# Patient Record
Sex: FEMALE | Race: WHITE | Employment: OTHER | URBAN - METROPOLITAN AREA
[De-identification: names, ages, dates, MRNs, and addresses within clinical notes are randomized per-mention and may not be internally consistent; named-entity substitution may affect disease eponyms.]

---

## 2017-08-29 PROBLEM — M54.12 C6 RADICULOPATHY: Status: ACTIVE | Noted: 2017-08-29

## 2017-09-15 PROBLEM — M45.9 RHEUMATOID ARTHRITIS INVOLVING VERTEBRA WITH POSITIVE RHEUMATOID FACTOR (HCC): Status: ACTIVE | Noted: 2017-09-15

## 2018-04-17 ENCOUNTER — OFFICE VISIT (OUTPATIENT)
Dept: FAMILY MEDICINE CLINIC | Age: 63
End: 2018-04-17
Payer: COMMERCIAL

## 2018-04-17 VITALS
WEIGHT: 150 LBS | TEMPERATURE: 97.9 F | DIASTOLIC BLOOD PRESSURE: 74 MMHG | SYSTOLIC BLOOD PRESSURE: 120 MMHG | HEIGHT: 62 IN | BODY MASS INDEX: 27.6 KG/M2 | OXYGEN SATURATION: 98 % | RESPIRATION RATE: 16 BRPM | HEART RATE: 71 BPM

## 2018-04-17 DIAGNOSIS — R53.83 FATIGUE, UNSPECIFIED TYPE: ICD-10-CM

## 2018-04-17 DIAGNOSIS — S09.90XA INJURY OF HEAD, INITIAL ENCOUNTER: ICD-10-CM

## 2018-04-17 DIAGNOSIS — D64.9 ANEMIA, UNSPECIFIED TYPE: ICD-10-CM

## 2018-04-17 DIAGNOSIS — M05.79 RHEUMATOID ARTHRITIS INVOLVING MULTIPLE SITES WITH POSITIVE RHEUMATOID FACTOR (HCC): ICD-10-CM

## 2018-04-17 DIAGNOSIS — E11.9 TYPE 2 DIABETES MELLITUS WITHOUT COMPLICATION, WITHOUT LONG-TERM CURRENT USE OF INSULIN (HCC): Primary | ICD-10-CM

## 2018-04-17 DIAGNOSIS — E55.9 VITAMIN D DEFICIENCY: ICD-10-CM

## 2018-04-17 LAB — HBA1C MFR BLD: 6.3 %

## 2018-04-17 PROCEDURE — G8427 DOCREV CUR MEDS BY ELIG CLIN: HCPCS | Performed by: NURSE PRACTITIONER

## 2018-04-17 PROCEDURE — 3014F SCREEN MAMMO DOC REV: CPT | Performed by: NURSE PRACTITIONER

## 2018-04-17 PROCEDURE — 83036 HEMOGLOBIN GLYCOSYLATED A1C: CPT | Performed by: NURSE PRACTITIONER

## 2018-04-17 PROCEDURE — 2022F DILAT RTA XM EVC RTNOPTHY: CPT | Performed by: NURSE PRACTITIONER

## 2018-04-17 PROCEDURE — 3017F COLORECTAL CA SCREEN DOC REV: CPT | Performed by: NURSE PRACTITIONER

## 2018-04-17 PROCEDURE — 1036F TOBACCO NON-USER: CPT | Performed by: NURSE PRACTITIONER

## 2018-04-17 PROCEDURE — 99214 OFFICE O/P EST MOD 30 MIN: CPT | Performed by: NURSE PRACTITIONER

## 2018-04-17 PROCEDURE — G8417 CALC BMI ABV UP PARAM F/U: HCPCS | Performed by: NURSE PRACTITIONER

## 2018-04-17 PROCEDURE — 3044F HG A1C LEVEL LT 7.0%: CPT | Performed by: NURSE PRACTITIONER

## 2018-04-17 RX ORDER — TRAMADOL HYDROCHLORIDE 50 MG/1
50 TABLET ORAL EVERY 8 HOURS PRN
Qty: 90 TABLET | Refills: 0 | Status: SHIPPED | OUTPATIENT
Start: 2018-04-17 | End: 2018-05-15 | Stop reason: SDUPTHER

## 2018-04-17 ASSESSMENT — ENCOUNTER SYMPTOMS
VOMITING: 0
COUGH: 0
NAUSEA: 0
CONSTIPATION: 1
BACK PAIN: 1
WHEEZING: 0
SHORTNESS OF BREATH: 0
DIARRHEA: 0

## 2018-04-17 ASSESSMENT — PATIENT HEALTH QUESTIONNAIRE - PHQ9
SUM OF ALL RESPONSES TO PHQ QUESTIONS 1-9: 0
1. LITTLE INTEREST OR PLEASURE IN DOING THINGS: 0
SUM OF ALL RESPONSES TO PHQ9 QUESTIONS 1 & 2: 0
2. FEELING DOWN, DEPRESSED OR HOPELESS: 0

## 2018-05-04 ENCOUNTER — EVALUATION (OUTPATIENT)
Dept: PHYSICAL THERAPY | Age: 63
End: 2018-05-04
Payer: COMMERCIAL

## 2018-05-04 DIAGNOSIS — M45.9 RHEUMATOID ARTHRITIS INVOLVING VERTEBRA WITH POSITIVE RHEUMATOID FACTOR (HCC): Primary | ICD-10-CM

## 2018-05-04 PROCEDURE — 97162 PT EVAL MOD COMPLEX 30 MIN: CPT | Performed by: PHYSICAL THERAPIST

## 2018-05-09 ENCOUNTER — TREATMENT (OUTPATIENT)
Dept: PHYSICAL THERAPY | Age: 63
End: 2018-05-09
Payer: COMMERCIAL

## 2018-05-09 DIAGNOSIS — M45.9 RHEUMATOID ARTHRITIS INVOLVING VERTEBRA WITH POSITIVE RHEUMATOID FACTOR (HCC): Primary | ICD-10-CM

## 2018-05-09 PROCEDURE — G0283 ELEC STIM OTHER THAN WOUND: HCPCS

## 2018-05-09 PROCEDURE — 97110 THERAPEUTIC EXERCISES: CPT

## 2018-05-12 ENCOUNTER — HOSPITAL ENCOUNTER (OUTPATIENT)
Age: 63
Discharge: HOME | End: 2018-05-12
Payer: COMMERCIAL

## 2018-05-12 DIAGNOSIS — D64.9 ANEMIA, UNSPECIFIED TYPE: ICD-10-CM

## 2018-05-12 DIAGNOSIS — R53.83 FATIGUE, UNSPECIFIED TYPE: ICD-10-CM

## 2018-05-12 DIAGNOSIS — E11.9 TYPE 2 DIABETES MELLITUS WITHOUT COMPLICATION, WITHOUT LONG-TERM CURRENT USE OF INSULIN (HCC): ICD-10-CM

## 2018-05-12 DIAGNOSIS — E55.9 VITAMIN D DEFICIENCY: ICD-10-CM

## 2018-05-12 LAB
ALBUMIN SERPL-MCNC: 3.5 G/DL (ref 3.5–5.2)
ALP BLD-CCNC: 95 U/L (ref 35–104)
ALT SERPL-CCNC: 9 U/L (ref 0–32)
ANION GAP SERPL CALCULATED.3IONS-SCNC: 10 MMOL/L (ref 7–16)
AST SERPL-CCNC: 12 U/L (ref 0–31)
BASOPHILS ABSOLUTE: 0.03 E9/L (ref 0–0.2)
BASOPHILS RELATIVE PERCENT: 0.4 % (ref 0–2)
BILIRUB SERPL-MCNC: 0.2 MG/DL (ref 0–1.2)
BUN BLDV-MCNC: 21 MG/DL (ref 8–23)
CALCIUM SERPL-MCNC: 9.2 MG/DL (ref 8.6–10.2)
CHLORIDE BLD-SCNC: 98 MMOL/L (ref 98–107)
CHOLESTEROL, TOTAL: 169 MG/DL (ref 0–199)
CO2: 31 MMOL/L (ref 22–29)
CREAT SERPL-MCNC: 0.8 MG/DL (ref 0.5–1)
EOSINOPHILS ABSOLUTE: 0.14 E9/L (ref 0.05–0.5)
EOSINOPHILS RELATIVE PERCENT: 2.1 % (ref 0–6)
GFR AFRICAN AMERICAN: >60
GFR NON-AFRICAN AMERICAN: >60 ML/MIN/1.73
GLUCOSE BLD-MCNC: 127 MG/DL (ref 74–109)
HCT VFR BLD CALC: 28.5 % (ref 34–48)
HDLC SERPL-MCNC: 67 MG/DL
HEMOGLOBIN: 8.9 G/DL (ref 11.5–15.5)
IMMATURE GRANULOCYTES #: 0.03 E9/L
IMMATURE GRANULOCYTES %: 0.4 % (ref 0–5)
LDL CHOLESTEROL CALCULATED: 89 MG/DL (ref 0–99)
LYMPHOCYTES ABSOLUTE: 1.02 E9/L (ref 1.5–4)
LYMPHOCYTES RELATIVE PERCENT: 15.1 % (ref 20–42)
MCH RBC QN AUTO: 27.5 PG (ref 26–35)
MCHC RBC AUTO-ENTMCNC: 31.2 % (ref 32–34.5)
MCV RBC AUTO: 88 FL (ref 80–99.9)
MONOCYTES ABSOLUTE: 0.63 E9/L (ref 0.1–0.95)
MONOCYTES RELATIVE PERCENT: 9.3 % (ref 2–12)
NEUTROPHILS ABSOLUTE: 4.91 E9/L (ref 1.8–7.3)
NEUTROPHILS RELATIVE PERCENT: 72.7 % (ref 43–80)
PDW BLD-RTO: 14.8 FL (ref 11.5–15)
PLATELET # BLD: 247 E9/L (ref 130–450)
PMV BLD AUTO: 10.1 FL (ref 7–12)
POTASSIUM SERPL-SCNC: 4.1 MMOL/L (ref 3.5–5)
RBC # BLD: 3.24 E12/L (ref 3.5–5.5)
SODIUM BLD-SCNC: 139 MMOL/L (ref 132–146)
TOTAL PROTEIN: 8.3 G/DL (ref 6.4–8.3)
TRIGL SERPL-MCNC: 65 MG/DL (ref 0–149)
TSH SERPL DL<=0.05 MIU/L-ACNC: 1.82 UIU/ML (ref 0.27–4.2)
VITAMIN D 25-HYDROXY: 18 NG/ML (ref 30–100)
VLDLC SERPL CALC-MCNC: 13 MG/DL
WBC # BLD: 6.8 E9/L (ref 4.5–11.5)

## 2018-05-12 PROCEDURE — 85025 COMPLETE CBC W/AUTO DIFF WBC: CPT

## 2018-05-12 PROCEDURE — 36415 COLL VENOUS BLD VENIPUNCTURE: CPT

## 2018-05-12 PROCEDURE — 80061 LIPID PANEL: CPT

## 2018-05-12 PROCEDURE — 84443 ASSAY THYROID STIM HORMONE: CPT

## 2018-05-12 PROCEDURE — 82306 VITAMIN D 25 HYDROXY: CPT

## 2018-05-12 PROCEDURE — 80053 COMPREHEN METABOLIC PANEL: CPT

## 2018-05-14 ENCOUNTER — TELEPHONE (OUTPATIENT)
Dept: PHYSICAL THERAPY | Age: 63
End: 2018-05-14

## 2018-05-15 ENCOUNTER — OFFICE VISIT (OUTPATIENT)
Dept: FAMILY MEDICINE CLINIC | Age: 63
End: 2018-05-15
Payer: COMMERCIAL

## 2018-05-15 VITALS
TEMPERATURE: 98.1 F | WEIGHT: 166 LBS | HEART RATE: 77 BPM | SYSTOLIC BLOOD PRESSURE: 118 MMHG | OXYGEN SATURATION: 97 % | BODY MASS INDEX: 30.55 KG/M2 | DIASTOLIC BLOOD PRESSURE: 70 MMHG | HEIGHT: 62 IN

## 2018-05-15 DIAGNOSIS — E55.9 VITAMIN D DEFICIENCY: Primary | ICD-10-CM

## 2018-05-15 DIAGNOSIS — M05.79 RHEUMATOID ARTHRITIS INVOLVING MULTIPLE SITES WITH POSITIVE RHEUMATOID FACTOR (HCC): ICD-10-CM

## 2018-05-15 DIAGNOSIS — Z71.2 ENCOUNTER TO DISCUSS TEST RESULTS: ICD-10-CM

## 2018-05-15 PROCEDURE — 3017F COLORECTAL CA SCREEN DOC REV: CPT | Performed by: NURSE PRACTITIONER

## 2018-05-15 PROCEDURE — 1036F TOBACCO NON-USER: CPT | Performed by: NURSE PRACTITIONER

## 2018-05-15 PROCEDURE — 99213 OFFICE O/P EST LOW 20 MIN: CPT | Performed by: NURSE PRACTITIONER

## 2018-05-15 PROCEDURE — G8427 DOCREV CUR MEDS BY ELIG CLIN: HCPCS | Performed by: NURSE PRACTITIONER

## 2018-05-15 PROCEDURE — G8417 CALC BMI ABV UP PARAM F/U: HCPCS | Performed by: NURSE PRACTITIONER

## 2018-05-15 RX ORDER — ERGOCALCIFEROL 1.25 MG/1
50000 CAPSULE ORAL WEEKLY
Qty: 12 CAPSULE | Refills: 0 | Status: SHIPPED | OUTPATIENT
Start: 2018-05-15 | End: 2019-01-02 | Stop reason: ALTCHOICE

## 2018-05-15 RX ORDER — TRAMADOL HYDROCHLORIDE 50 MG/1
50 TABLET ORAL EVERY 8 HOURS PRN
Qty: 90 TABLET | Refills: 0 | Status: SHIPPED | OUTPATIENT
Start: 2018-05-15 | End: 2018-08-07 | Stop reason: SDUPTHER

## 2018-05-15 RX ORDER — ERGOCALCIFEROL 1.25 MG/1
CAPSULE ORAL
Refills: 0 | COMMUNITY
Start: 2018-04-23 | End: 2018-06-12

## 2018-05-15 ASSESSMENT — ENCOUNTER SYMPTOMS
VOMITING: 0
BLURRED VISION: 0
SHORTNESS OF BREATH: 0
WHEEZING: 0
BACK PAIN: 1
DIARRHEA: 0
CONSTIPATION: 0
COUGH: 0
NAUSEA: 0

## 2018-05-17 ENCOUNTER — TREATMENT (OUTPATIENT)
Dept: PHYSICAL THERAPY | Age: 63
End: 2018-05-17
Payer: COMMERCIAL

## 2018-05-17 DIAGNOSIS — M45.9 RHEUMATOID ARTHRITIS INVOLVING VERTEBRA WITH POSITIVE RHEUMATOID FACTOR (HCC): Primary | ICD-10-CM

## 2018-05-17 DIAGNOSIS — D64.9 ANEMIA, UNSPECIFIED TYPE: Primary | ICD-10-CM

## 2018-05-17 PROCEDURE — G0283 ELEC STIM OTHER THAN WOUND: HCPCS

## 2018-05-17 PROCEDURE — 97110 THERAPEUTIC EXERCISES: CPT

## 2018-05-24 ENCOUNTER — TREATMENT (OUTPATIENT)
Dept: PHYSICAL THERAPY | Age: 63
End: 2018-05-24
Payer: COMMERCIAL

## 2018-05-24 DIAGNOSIS — M45.9 RHEUMATOID ARTHRITIS INVOLVING VERTEBRA WITH POSITIVE RHEUMATOID FACTOR (HCC): Primary | ICD-10-CM

## 2018-05-24 PROCEDURE — G0283 ELEC STIM OTHER THAN WOUND: HCPCS

## 2018-05-24 PROCEDURE — 97110 THERAPEUTIC EXERCISES: CPT

## 2018-05-31 ENCOUNTER — TREATMENT (OUTPATIENT)
Dept: PHYSICAL THERAPY | Age: 63
End: 2018-05-31
Payer: COMMERCIAL

## 2018-05-31 DIAGNOSIS — M45.9 RHEUMATOID ARTHRITIS INVOLVING VERTEBRA WITH POSITIVE RHEUMATOID FACTOR (HCC): Primary | ICD-10-CM

## 2018-05-31 PROCEDURE — G0283 ELEC STIM OTHER THAN WOUND: HCPCS

## 2018-05-31 PROCEDURE — 97110 THERAPEUTIC EXERCISES: CPT

## 2018-06-06 ENCOUNTER — TREATMENT (OUTPATIENT)
Dept: PHYSICAL THERAPY | Age: 63
End: 2018-06-06
Payer: COMMERCIAL

## 2018-06-06 DIAGNOSIS — M45.9 RHEUMATOID ARTHRITIS INVOLVING VERTEBRA WITH POSITIVE RHEUMATOID FACTOR (HCC): Primary | ICD-10-CM

## 2018-06-06 PROCEDURE — 97110 THERAPEUTIC EXERCISES: CPT

## 2018-06-06 PROCEDURE — G0283 ELEC STIM OTHER THAN WOUND: HCPCS

## 2018-06-08 ENCOUNTER — TREATMENT (OUTPATIENT)
Dept: PHYSICAL THERAPY | Age: 63
End: 2018-06-08
Payer: COMMERCIAL

## 2018-06-08 DIAGNOSIS — M45.9 RHEUMATOID ARTHRITIS INVOLVING VERTEBRA WITH POSITIVE RHEUMATOID FACTOR (HCC): Primary | ICD-10-CM

## 2018-06-08 PROCEDURE — 97110 THERAPEUTIC EXERCISES: CPT

## 2018-06-08 PROCEDURE — G0283 ELEC STIM OTHER THAN WOUND: HCPCS

## 2018-06-12 ENCOUNTER — OFFICE VISIT (OUTPATIENT)
Dept: FAMILY MEDICINE CLINIC | Age: 63
End: 2018-06-12
Payer: COMMERCIAL

## 2018-06-12 VITALS
OXYGEN SATURATION: 93 % | BODY MASS INDEX: 30.36 KG/M2 | RESPIRATION RATE: 16 BRPM | TEMPERATURE: 98.2 F | SYSTOLIC BLOOD PRESSURE: 130 MMHG | DIASTOLIC BLOOD PRESSURE: 78 MMHG | HEIGHT: 62 IN | HEART RATE: 91 BPM | WEIGHT: 165 LBS

## 2018-06-12 DIAGNOSIS — R29.898 DECREASED GRIP STRENGTH: ICD-10-CM

## 2018-06-12 DIAGNOSIS — J30.2 CHRONIC SEASONAL ALLERGIC RHINITIS, UNSPECIFIED TRIGGER: ICD-10-CM

## 2018-06-12 DIAGNOSIS — H61.23 BILATERAL IMPACTED CERUMEN: Primary | ICD-10-CM

## 2018-06-12 PROCEDURE — G8427 DOCREV CUR MEDS BY ELIG CLIN: HCPCS | Performed by: NURSE PRACTITIONER

## 2018-06-12 PROCEDURE — 99213 OFFICE O/P EST LOW 20 MIN: CPT | Performed by: NURSE PRACTITIONER

## 2018-06-12 PROCEDURE — 1036F TOBACCO NON-USER: CPT | Performed by: NURSE PRACTITIONER

## 2018-06-12 PROCEDURE — 3017F COLORECTAL CA SCREEN DOC REV: CPT | Performed by: NURSE PRACTITIONER

## 2018-06-12 PROCEDURE — G8417 CALC BMI ABV UP PARAM F/U: HCPCS | Performed by: NURSE PRACTITIONER

## 2018-06-12 ASSESSMENT — ENCOUNTER SYMPTOMS
SORE THROAT: 1
DIARRHEA: 0
NAUSEA: 0
BACK PAIN: 1
COUGH: 1
VOMITING: 0
CONSTIPATION: 0
SHORTNESS OF BREATH: 0
WHEEZING: 0

## 2018-06-13 ENCOUNTER — TREATMENT (OUTPATIENT)
Dept: PHYSICAL THERAPY | Age: 63
End: 2018-06-13
Payer: COMMERCIAL

## 2018-06-13 DIAGNOSIS — M45.9 RHEUMATOID ARTHRITIS INVOLVING VERTEBRA WITH POSITIVE RHEUMATOID FACTOR (HCC): Primary | ICD-10-CM

## 2018-06-13 PROCEDURE — 97110 THERAPEUTIC EXERCISES: CPT

## 2018-06-15 ENCOUNTER — TREATMENT (OUTPATIENT)
Dept: PHYSICAL THERAPY | Age: 63
End: 2018-06-15
Payer: COMMERCIAL

## 2018-06-15 DIAGNOSIS — M45.9 RHEUMATOID ARTHRITIS INVOLVING VERTEBRA WITH POSITIVE RHEUMATOID FACTOR (HCC): Primary | ICD-10-CM

## 2018-06-15 PROCEDURE — 97110 THERAPEUTIC EXERCISES: CPT

## 2018-06-20 ENCOUNTER — EVALUATION (OUTPATIENT)
Dept: OCCUPATIONAL THERAPY | Age: 63
End: 2018-06-20
Payer: COMMERCIAL

## 2018-06-20 ENCOUNTER — TREATMENT (OUTPATIENT)
Dept: PHYSICAL THERAPY | Age: 63
End: 2018-06-20
Payer: COMMERCIAL

## 2018-06-20 DIAGNOSIS — R29.898 DECREASED GRIP STRENGTH: ICD-10-CM

## 2018-06-20 DIAGNOSIS — M45.9 RHEUMATOID ARTHRITIS INVOLVING VERTEBRA WITH POSITIVE RHEUMATOID FACTOR (HCC): Primary | ICD-10-CM

## 2018-06-20 DIAGNOSIS — M06.9 RHEUMATOID ARTHRITIS INVOLVING BOTH HANDS, UNSPECIFIED RHEUMATOID FACTOR PRESENCE: Primary | ICD-10-CM

## 2018-06-20 PROCEDURE — 97110 THERAPEUTIC EXERCISES: CPT

## 2018-06-20 PROCEDURE — 97167 OT EVAL HIGH COMPLEX 60 MIN: CPT | Performed by: OCCUPATIONAL THERAPIST

## 2018-06-21 PROBLEM — R29.898 DECREASED GRIP STRENGTH: Status: ACTIVE | Noted: 2018-06-21

## 2018-06-22 ENCOUNTER — TELEPHONE (OUTPATIENT)
Dept: PHYSICAL THERAPY | Age: 63
End: 2018-06-22

## 2018-06-26 ENCOUNTER — HOSPITAL ENCOUNTER (OUTPATIENT)
Age: 63
Discharge: HOME | End: 2018-06-28
Payer: COMMERCIAL

## 2018-06-26 PROCEDURE — G0123 SCREEN CERV/VAG THIN LAYER: HCPCS

## 2018-06-27 ENCOUNTER — TREATMENT (OUTPATIENT)
Dept: PHYSICAL THERAPY | Age: 63
End: 2018-06-27
Payer: COMMERCIAL

## 2018-06-27 ENCOUNTER — TREATMENT (OUTPATIENT)
Dept: OCCUPATIONAL THERAPY | Age: 63
End: 2018-06-27
Payer: COMMERCIAL

## 2018-06-27 DIAGNOSIS — R29.898 DECREASED GRIP STRENGTH: ICD-10-CM

## 2018-06-27 DIAGNOSIS — M06.9 RHEUMATOID ARTHRITIS INVOLVING BOTH HANDS, UNSPECIFIED RHEUMATOID FACTOR PRESENCE: Primary | ICD-10-CM

## 2018-06-27 DIAGNOSIS — M45.9 RHEUMATOID ARTHRITIS INVOLVING VERTEBRA WITH POSITIVE RHEUMATOID FACTOR (HCC): Primary | ICD-10-CM

## 2018-06-27 PROCEDURE — 97110 THERAPEUTIC EXERCISES: CPT

## 2018-06-27 PROCEDURE — 97110 THERAPEUTIC EXERCISES: CPT | Performed by: OCCUPATIONAL THERAPIST

## 2018-06-27 PROCEDURE — 97535 SELF CARE MNGMENT TRAINING: CPT | Performed by: OCCUPATIONAL THERAPIST

## 2018-07-05 ENCOUNTER — TELEPHONE (OUTPATIENT)
Dept: PHYSICAL THERAPY | Age: 63
End: 2018-07-05

## 2018-07-11 ENCOUNTER — TREATMENT (OUTPATIENT)
Dept: OCCUPATIONAL THERAPY | Age: 63
End: 2018-07-11
Payer: COMMERCIAL

## 2018-07-11 DIAGNOSIS — M06.9 RHEUMATOID ARTHRITIS INVOLVING BOTH HANDS, UNSPECIFIED RHEUMATOID FACTOR PRESENCE: Primary | ICD-10-CM

## 2018-07-11 DIAGNOSIS — R29.898 DECREASED GRIP STRENGTH: ICD-10-CM

## 2018-07-11 PROCEDURE — 97110 THERAPEUTIC EXERCISES: CPT | Performed by: OCCUPATIONAL THERAPIST

## 2018-07-11 PROCEDURE — 97535 SELF CARE MNGMENT TRAINING: CPT | Performed by: OCCUPATIONAL THERAPIST

## 2018-07-13 ENCOUNTER — TREATMENT (OUTPATIENT)
Dept: PHYSICAL THERAPY | Age: 63
End: 2018-07-13
Payer: COMMERCIAL

## 2018-07-13 ENCOUNTER — TREATMENT (OUTPATIENT)
Dept: OCCUPATIONAL THERAPY | Age: 63
End: 2018-07-13
Payer: COMMERCIAL

## 2018-07-13 DIAGNOSIS — M06.9 RHEUMATOID ARTHRITIS INVOLVING BOTH HANDS, UNSPECIFIED RHEUMATOID FACTOR PRESENCE: Primary | ICD-10-CM

## 2018-07-13 DIAGNOSIS — M45.9 RHEUMATOID ARTHRITIS INVOLVING VERTEBRA WITH POSITIVE RHEUMATOID FACTOR (HCC): Primary | ICD-10-CM

## 2018-07-13 DIAGNOSIS — R29.898 DECREASED GRIP STRENGTH: ICD-10-CM

## 2018-07-13 PROCEDURE — 97110 THERAPEUTIC EXERCISES: CPT | Performed by: OCCUPATIONAL THERAPIST

## 2018-07-13 PROCEDURE — 97110 THERAPEUTIC EXERCISES: CPT

## 2018-07-13 PROCEDURE — 97535 SELF CARE MNGMENT TRAINING: CPT | Performed by: OCCUPATIONAL THERAPIST

## 2018-07-13 NOTE — PROGRESS NOTES
1145             Visit #: 4    Treatment Charges: Mins Units   Modalities: MH 5 0   Ther Exercise 30 2   Manual Therapy     Thera Activities     ADL/Home Mgt  10 1   Neuro Re-education     Gait Training     Group Therapy     Non-Billable Service Time     Other     Total Time/Units 45 3     -Response to Treatment: session tolerated well. Goals: Goals for pt can be seen on initial eval occurring on 6-20-18    Plan:   [x]  Continue Plan of care: Pt education continues at each visit to obtain maximum benefits from skilled OT intervention.   []  [facility] of care:   []  Discharge:      Ever Axel OT/L  [id number]

## 2018-07-13 NOTE — PROGRESS NOTES
Physical Therapy Daily Treatment Note    Date: 2018  Patient Name: Yokasta Macias  : 1955   MRN: [id number]  DOInjury:  Onset   DOSx:  none   Referring Provider:  XIOMARA Pablo CNP     Medical Diagnosis:  RA involving multiple sites      Outcome Measure: Body positioning Current:  CK                                                                   Goal:      CI    S: Pt reports continued  soreness , varies each day. O:  Time 9752-6150 am     Visit -12            G code 18    Pain 7/10     ROM      Modalities      MH    to c spn X 15min      Pre ex's Sitting      Ice        Pt given green t tubing  For HEP 18    Manual                  Stretch                        Exercise      UBE  or      Nustep   L 5 x 10 min  TE   ROWS: H   Green 2 x 20 seated   TE   ROWS: M Green 2 x 20 seated    TE   ROWS: L Green  2 x 20 seated   TE   Shoulder ER      Cross country ski Green  2 x 20 seated      Bicep curls  2#  2 x 20  TE   Shrugs 2# 2  x 20  TE   Shoulder Press 2# 2 x 20      Cervical isometrics X 5 reps each             HEP                 A:  Tolerated well. P: last rx session of script , pt to continue with written HEP.    Zackery Ebbs, PTA    Treatment Charges: Mins Units   Initial Evaluation     Re-Evaluation     Ther Exercise         TE 35 2   Manual Therapy     MT     Ther Activities        TA     Gait Training          GT     Neuro Re-education NR     Modalities     Non-Billable Service Time MH x 15    Other     Total Time/Units 50 2

## 2018-07-18 ENCOUNTER — TREATMENT (OUTPATIENT)
Dept: OCCUPATIONAL THERAPY | Age: 63
End: 2018-07-18
Payer: COMMERCIAL

## 2018-07-18 DIAGNOSIS — M06.9 RHEUMATOID ARTHRITIS INVOLVING BOTH HANDS, UNSPECIFIED RHEUMATOID FACTOR PRESENCE: Primary | ICD-10-CM

## 2018-07-18 DIAGNOSIS — R29.898 DECREASED GRIP STRENGTH: ICD-10-CM

## 2018-07-18 PROCEDURE — 97535 SELF CARE MNGMENT TRAINING: CPT | Performed by: OCCUPATIONAL THERAPIST

## 2018-07-18 PROCEDURE — 97110 THERAPEUTIC EXERCISES: CPT | Performed by: OCCUPATIONAL THERAPIST

## 2018-07-20 ENCOUNTER — TREATMENT (OUTPATIENT)
Dept: OCCUPATIONAL THERAPY | Age: 63
End: 2018-07-20
Payer: COMMERCIAL

## 2018-07-20 DIAGNOSIS — R29.898 DECREASED GRIP STRENGTH: ICD-10-CM

## 2018-07-20 DIAGNOSIS — M06.9 RHEUMATOID ARTHRITIS INVOLVING BOTH HANDS, UNSPECIFIED RHEUMATOID FACTOR PRESENCE: Primary | ICD-10-CM

## 2018-07-20 PROCEDURE — 97140 MANUAL THERAPY 1/> REGIONS: CPT | Performed by: OCCUPATIONAL THERAPIST

## 2018-07-20 PROCEDURE — 97110 THERAPEUTIC EXERCISES: CPT | Performed by: OCCUPATIONAL THERAPIST

## 2018-07-20 NOTE — PROGRESS NOTES
OCCUPATIONAL THERAPY PROGRESS NOTE    Date:  2018  Initial Evaluation Date: 18    Patient Name:  Td Wilkinson    :  1955  Restrictions/Precautions: Activity as tolerated, Moderate fall risk  Diagnosis:  RA/ Decreased  strength                                                       Insurance/Certification information:  Medical mutual  Referring Physician:  Ronni Mayers CNP/ Dr Joelle Mckay  Date of Surgery/Injury: NA  Plan of care signed (Y/N):  N  Visit# / total visits: -10    Pain Level: 5 on scale of 1-10, aching and uncomfortable in the right hand > than the left  Subjective: \" I took some Aleve. I am more sore because the weather is changing\". Objective:  Updated POC to be completed by visit 10. INTERVENTION: COMPLETED: SPECIFICS/COMMENTS:   Modality:     MH x B hands x Completed as preconditioning of both hands prior to ROM ex        ADL/ adaptive equip     Opening containers x Discussed options for jar openers   Door handles     Zippers/ buttons x Practiced buttoning with her light coat. Discussed options with adapting the closures with velcro. Pt is interested. Will purchase the heat sensitive velcro. Using scissors  Spring loaded adaptive scissors provided        AROM:      General AROM B hands x  digit flexion/ ext and ABD/ ADD, opposition as tolerated- more sore today than usual                       Manual techniques- soft tissue  And retrograde massage             x  Completed for both hands- increased swelling observed across the MCPs. Strengthening:     Putty ex- soft- B x Gripping 20+ each , roll and pinch 2x each,  taffy pull (with med soft), coin manipulation 3 pennies/ B        Other: HEP     Putty ex- soft x    Educational info x Pt provided with information about arthritis pain mgmt, and joint protection to read at home- will review at next appt. Splinting x Splinting for protection of the left wrist and hand joints discussed.  Will order combo wrist/ hand UD splint to help. Assessment/Comments: Pt is making Fair + progress toward stated plan of care. -Rehab Potential: Good  -Requires OT Follow Up: Yes  Time In:1015           Time Out: 7997             Visit #: 6    Treatment Charges: Mins Units   Modalities: MH 5 0   Ther Exercise 35 2   Manual Therapy 10 1   Thera Activities     ADL/Home Mgt      Neuro Re-education     Gait Training     Group Therapy     Non-Billable Service Time     Other     Total Time/Units 50 3     -Response to Treatment: Light hand strengthening ex continued today as tolerated. Pt c/o of a increase in joint soreness. Frequent short rests are helpful during session. Motivation for recovery is good. Pt reported hand fatigue by Tx end. Goals: Goals for pt can be seen on initial eval occurring on 6-20-18    Plan:   [x]  Continue Plan of care: Pt education continues at each visit to obtain maximum benefits from skilled OT intervention.   []  [facility] of care:   []  Discharge:      Say Carter OT/L  [id number]

## 2018-08-01 ENCOUNTER — TREATMENT (OUTPATIENT)
Dept: OCCUPATIONAL THERAPY | Age: 63
End: 2018-08-01
Payer: COMMERCIAL

## 2018-08-01 DIAGNOSIS — M06.9 RHEUMATOID ARTHRITIS INVOLVING BOTH HANDS, UNSPECIFIED RHEUMATOID FACTOR PRESENCE: Primary | ICD-10-CM

## 2018-08-01 DIAGNOSIS — R29.898 DECREASED GRIP STRENGTH: ICD-10-CM

## 2018-08-01 PROCEDURE — 97140 MANUAL THERAPY 1/> REGIONS: CPT | Performed by: OCCUPATIONAL THERAPIST

## 2018-08-01 PROCEDURE — 97535 SELF CARE MNGMENT TRAINING: CPT | Performed by: OCCUPATIONAL THERAPIST

## 2018-08-01 PROCEDURE — 97110 THERAPEUTIC EXERCISES: CPT | Performed by: OCCUPATIONAL THERAPIST

## 2018-08-01 NOTE — PROGRESS NOTES
OCCUPATIONAL THERAPY PROGRESS NOTE    Date:  2018  Initial Evaluation Date: 18    Patient Name:  Jermain Scanlon    :  1955  Restrictions/Precautions: Activity as tolerated, Moderate fall risk  Diagnosis:  RA/ Decreased  strength                                                       Insurance/Certification information:  Medical mutual  Referring Physician:  Lorena Arcos CNP/ Dr Jansen Bookbinder  Date of Surgery/Injury: NA  Plan of care signed (Y/N):  N  Visit# / total visits: -10    Pain Level: 4-5 on scale of 1-10, aching and uncomfortable in the right hand > than the left  Subjective: \" I feel awful today. My neck and feet are really hurting\". Objective:  Updated POC to be completed by visit 10. INTERVENTION: COMPLETED: SPECIFICS/COMMENTS:   Modality:     MH x B hands x Completed as preconditioning of both hands prior to ROM ex        ADL/ adaptive equip     Opening containers  Discussed options for jar openers   Door handles     Zippers/ buttons  Practiced buttoning with her light coat. Discussed options with adapting the closures with velcro. Pt is interested. Will purchase the heat sensitive velcro. Using scissors  Spring loaded adaptive scissors provided   Coat adaptation x Patient's spring coat adapted today to include velcro closures. Additional velcro provided for home use   AROM:      General AROM B hands x  digit flexion/ ext and ABD/ ADD, opposition as tolerated- more sore today than usual                       Manual techniques- soft tissue  And retrograde massage             x  Completed for both hands- increased swelling observed across the MCPs.     Strengthening:     Putty ex- soft to med soft blend- B x Gripping 20+ each , roll and pinch 2x each,  taffy pull (with med soft), coin manipulation 3 pennies/ B        Other: HEP     Putty ex- soft x    Educational info x Pt provided with information about arthritis pain mgmt, and joint protection to read at home- will review at

## 2018-08-03 ENCOUNTER — TREATMENT (OUTPATIENT)
Dept: OCCUPATIONAL THERAPY | Age: 63
End: 2018-08-03
Payer: COMMERCIAL

## 2018-08-03 DIAGNOSIS — M06.9 RHEUMATOID ARTHRITIS INVOLVING BOTH HANDS, UNSPECIFIED RHEUMATOID FACTOR PRESENCE: Primary | ICD-10-CM

## 2018-08-03 DIAGNOSIS — R29.898 DECREASED GRIP STRENGTH: ICD-10-CM

## 2018-08-03 PROCEDURE — G8984 CARRY CURRENT STATUS: HCPCS | Performed by: OCCUPATIONAL THERAPIST

## 2018-08-03 PROCEDURE — 97110 THERAPEUTIC EXERCISES: CPT | Performed by: OCCUPATIONAL THERAPIST

## 2018-08-03 PROCEDURE — G8986 CARRY D/C STATUS: HCPCS | Performed by: OCCUPATIONAL THERAPIST

## 2018-08-03 PROCEDURE — G8985 CARRY GOAL STATUS: HCPCS | Performed by: OCCUPATIONAL THERAPIST

## 2018-08-03 NOTE — PROGRESS NOTES
arthritis pain mgmt, and joint protection to read at home          Assessment/Comments:     GOALS (Long term same as Short term):  1) Patient will demonstrate good understanding of home program(exercises/activities/diagnosis/prognosis/goals) with good accuracy. (goal met)    2) Patient will demonstrate increased active/passive range of motion of their left to [facility] for ADL/IADL completion. (limited progress)  AROM in both hands has shown minimal progress with ROM exercises. 3) Patient will demonstrate increased /pinch strength of at least 10 / 5 pinch pounds of their both hands. (goal not met)  R  7# from 10#, L  5# from 9#    4) Increase in fine motor function as evidenced by decreased time to complete 9-hole peg test and/or MRMT test by at least 10 seconds. (goal not met)  Nine Hole Test  R- 57 sec from 48 sec  L- 48 sec from 48 sec    5) Patient will report improved ADL, IADL, and leisure functions and will show improved Quick dash scores. Slight improvement)  Quick Dash score has improved from 54% impairment to 45% impairment. 6) Patient will demonstrate/report proper assimilation of compensatory techniques to complete ADLs and IADLs. -Requires OT Follow Up: Yes  Time In:10:30         Time Out: 1115             Visit #: 8    Treatment Charges: Mins Units   Modalities: MH 5 0   Ther Exercise 40 3   Manual Therapy     Thera Activities     ADL/Home Mgt   1   Neuro Re-education     Gait Training     Group Therapy     Non-Billable Service Time     Other     Total Time/Units 45 3     -Response to Treatment: Overall progress has been limited due to joint pains and arthritic deformities in her hands. Quick Dash: Carrying, moving, handling  Admission: 54%- CK  Current: 45%- CK  Goal: CJ      Plan:   []  Continue Plan of care: Pt education continues at each visit to obtain maximum benefits from skilled OT intervention.   []  Alter Plan of care:   [x]  Discharge: with HEP due to limited progress      Bello Farah OT/L  [id number]

## 2018-08-07 ENCOUNTER — TELEPHONE (OUTPATIENT)
Dept: FAMILY MEDICINE CLINIC | Age: 63
End: 2018-08-07

## 2018-08-07 ENCOUNTER — HOSPITAL ENCOUNTER (OUTPATIENT)
Age: 63
Discharge: HOME | End: 2018-08-09
Payer: COMMERCIAL

## 2018-08-07 ENCOUNTER — OFFICE VISIT (OUTPATIENT)
Dept: FAMILY MEDICINE CLINIC | Age: 63
End: 2018-08-07
Payer: COMMERCIAL

## 2018-08-07 VITALS
TEMPERATURE: 98.2 F | HEART RATE: 98 BPM | HEIGHT: 62 IN | OXYGEN SATURATION: 98 % | BODY MASS INDEX: 29.08 KG/M2 | WEIGHT: 158 LBS | SYSTOLIC BLOOD PRESSURE: 128 MMHG | RESPIRATION RATE: 16 BRPM | DIASTOLIC BLOOD PRESSURE: 76 MMHG

## 2018-08-07 DIAGNOSIS — R73.09 ELEVATED HEMOGLOBIN A1C: ICD-10-CM

## 2018-08-07 DIAGNOSIS — M05.79 RHEUMATOID ARTHRITIS INVOLVING MULTIPLE SITES WITH POSITIVE RHEUMATOID FACTOR (HCC): Primary | ICD-10-CM

## 2018-08-07 DIAGNOSIS — J30.2 SEASONAL ALLERGIES: ICD-10-CM

## 2018-08-07 DIAGNOSIS — D64.9 ANEMIA, UNSPECIFIED TYPE: ICD-10-CM

## 2018-08-07 LAB
BASOPHILS ABSOLUTE: 0.09 E9/L (ref 0–0.2)
BASOPHILS RELATIVE PERCENT: 1.4 % (ref 0–2)
EOSINOPHILS ABSOLUTE: 0.22 E9/L (ref 0.05–0.5)
EOSINOPHILS RELATIVE PERCENT: 3.4 % (ref 0–6)
FERRITIN: 121 NG/ML
HBA1C MFR BLD: 6.3 % (ref 4–5.6)
HCT VFR BLD CALC: 31.3 % (ref 34–48)
HEMOGLOBIN: 10 G/DL (ref 11.5–15.5)
IMMATURE GRANULOCYTES #: 0.02 E9/L
IMMATURE GRANULOCYTES %: 0.3 % (ref 0–5)
IRON SATURATION: 12 % (ref 15–50)
IRON: 37 MCG/DL (ref 37–145)
LYMPHOCYTES ABSOLUTE: 1.3 E9/L (ref 1.5–4)
LYMPHOCYTES RELATIVE PERCENT: 20.2 % (ref 20–42)
MCH RBC QN AUTO: 28.2 PG (ref 26–35)
MCHC RBC AUTO-ENTMCNC: 31.9 % (ref 32–34.5)
MCV RBC AUTO: 88.2 FL (ref 80–99.9)
MONOCYTES ABSOLUTE: 0.67 E9/L (ref 0.1–0.95)
MONOCYTES RELATIVE PERCENT: 10.4 % (ref 2–12)
NEUTROPHILS ABSOLUTE: 4.12 E9/L (ref 1.8–7.3)
NEUTROPHILS RELATIVE PERCENT: 64.3 % (ref 43–80)
PDW BLD-RTO: 14.6 FL (ref 11.5–15)
PLATELET # BLD: 263 E9/L (ref 130–450)
PMV BLD AUTO: 10.8 FL (ref 7–12)
RBC # BLD: 3.55 E12/L (ref 3.5–5.5)
TOTAL IRON BINDING CAPACITY: 300 MCG/DL (ref 250–450)
WBC # BLD: 6.4 E9/L (ref 4.5–11.5)

## 2018-08-07 PROCEDURE — 83036 HEMOGLOBIN GLYCOSYLATED A1C: CPT

## 2018-08-07 PROCEDURE — 1036F TOBACCO NON-USER: CPT | Performed by: NURSE PRACTITIONER

## 2018-08-07 PROCEDURE — 82728 ASSAY OF FERRITIN: CPT

## 2018-08-07 PROCEDURE — 85025 COMPLETE CBC W/AUTO DIFF WBC: CPT

## 2018-08-07 PROCEDURE — 99213 OFFICE O/P EST LOW 20 MIN: CPT | Performed by: NURSE PRACTITIONER

## 2018-08-07 PROCEDURE — 83550 IRON BINDING TEST: CPT

## 2018-08-07 PROCEDURE — G8427 DOCREV CUR MEDS BY ELIG CLIN: HCPCS | Performed by: NURSE PRACTITIONER

## 2018-08-07 PROCEDURE — 83540 ASSAY OF IRON: CPT

## 2018-08-07 PROCEDURE — G8417 CALC BMI ABV UP PARAM F/U: HCPCS | Performed by: NURSE PRACTITIONER

## 2018-08-07 PROCEDURE — 3017F COLORECTAL CA SCREEN DOC REV: CPT | Performed by: NURSE PRACTITIONER

## 2018-08-07 RX ORDER — MONTELUKAST SODIUM 10 MG/1
10 TABLET ORAL NIGHTLY
Qty: 30 TABLET | Refills: 5 | Status: SHIPPED | OUTPATIENT
Start: 2018-08-07 | End: 2019-01-02 | Stop reason: SDUPTHER

## 2018-08-07 RX ORDER — TRAMADOL HYDROCHLORIDE 50 MG/1
50 TABLET ORAL EVERY 8 HOURS PRN
Qty: 90 TABLET | Refills: 0 | Status: SHIPPED | OUTPATIENT
Start: 2018-08-07 | End: 2018-09-20 | Stop reason: SDUPTHER

## 2018-08-07 RX ORDER — TRAMADOL HYDROCHLORIDE 50 MG/1
50 TABLET ORAL EVERY 8 HOURS PRN
Qty: 90 TABLET | Refills: 0 | Status: SHIPPED | OUTPATIENT
Start: 2018-08-07 | End: 2018-08-07 | Stop reason: SDUPTHER

## 2018-08-07 ASSESSMENT — ENCOUNTER SYMPTOMS
DIARRHEA: 0
COUGH: 0
WHEEZING: 0
CONSTIPATION: 0
SHORTNESS OF BREATH: 0
NAUSEA: 0
VOMITING: 0

## 2018-08-07 NOTE — PROGRESS NOTES
HPI:  Patient comes in today for   Chief Complaint   Patient presents with    Neck Pain     pain 6/10    Shoulder Pain     right shoulder pain 6/10    Headache     recently having alot       Crowder Bile is here today for chronic neck and right shoulder pain. Aggravated by daily activity, alleviated by rest. Controlled with current medication. Has been having more headaches lately, her sinuses have been acting up recently. Prior to Visit Medications    Medication Sig Taking? Authorizing Provider   montelukast (SINGULAIR) 10 MG tablet Take 1 tablet by mouth nightly Yes Miya Jerez APRN - CNP   traMADol (ULTRAM) 50 MG tablet Take 1 tablet by mouth every 8 hours as needed for Pain for up to 30 days. Marianne Shipley, APRN - CNP   vitamin D (ERGOCALCIFEROL) 29668 units CAPS capsule Take 1 capsule by mouth once a week Take 50,000 IU every Sunday and 2,000 IU everyday except Sunday. Hold monthly Vitamin D until this Rx done. Yes Renard Reece APRN - CNP   ibuprofen (ADVIL;MOTRIN) 200 MG tablet Take 200 mg by mouth every 6 hours as needed for Pain Yes Historical Provider, MD   fluticasone (FLONASE) 50 MCG/ACT nasal spray 1 spray by Nasal route daily Yes Historical Provider, MD   furosemide (LASIX) 20 MG tablet  Yes Historical Provider, MD       Allergies   Allergen Reactions    Iodine Shortness Of Breath     Wheezing      Review of Systems  Review of Systems   Constitutional: Negative for malaise/fatigue and weight loss. Respiratory: Negative for cough, shortness of breath and wheezing. Cardiovascular: Negative for chest pain and palpitations. Gastrointestinal: Negative for constipation, diarrhea, nausea and vomiting. Musculoskeletal: Positive for joint pain (right shoulder), myalgias and neck pain. Neurological: Positive for tingling (4th and 5th digits are both hands, history of carpal tunnel) and headaches. Negative for dizziness, focal weakness and weakness.      VS:  /76   Pulse 98

## 2018-08-14 DIAGNOSIS — D64.9 ANEMIA, UNSPECIFIED TYPE: Primary | ICD-10-CM

## 2018-08-14 RX ORDER — PNV NO.95/FERROUS FUM/FOLIC AC 28MG-0.8MG
325 TABLET ORAL DAILY
Qty: 30 TABLET | Refills: 3 | Status: SHIPPED | OUTPATIENT
Start: 2018-08-14 | End: 2018-09-20 | Stop reason: SDUPTHER

## 2018-09-20 ENCOUNTER — OFFICE VISIT (OUTPATIENT)
Dept: FAMILY MEDICINE CLINIC | Age: 63
End: 2018-09-20
Payer: COMMERCIAL

## 2018-09-20 VITALS
HEART RATE: 71 BPM | RESPIRATION RATE: 16 BRPM | OXYGEN SATURATION: 96 % | HEIGHT: 62 IN | BODY MASS INDEX: 28.9 KG/M2 | DIASTOLIC BLOOD PRESSURE: 86 MMHG | SYSTOLIC BLOOD PRESSURE: 138 MMHG | TEMPERATURE: 98 F

## 2018-09-20 DIAGNOSIS — Z23 IMMUNIZATION DUE: ICD-10-CM

## 2018-09-20 DIAGNOSIS — D64.9 ANEMIA, UNSPECIFIED TYPE: ICD-10-CM

## 2018-09-20 DIAGNOSIS — S09.90XD RECENT HEAD TRAUMA, SUBSEQUENT ENCOUNTER: ICD-10-CM

## 2018-09-20 DIAGNOSIS — M05.79 RHEUMATOID ARTHRITIS INVOLVING MULTIPLE SITES WITH POSITIVE RHEUMATOID FACTOR (HCC): ICD-10-CM

## 2018-09-20 DIAGNOSIS — I73.9 PVD (PERIPHERAL VASCULAR DISEASE) (HCC): Primary | ICD-10-CM

## 2018-09-20 DIAGNOSIS — R51.9 NEW ONSET OF HEADACHES AFTER AGE 50: Primary | ICD-10-CM

## 2018-09-20 PROCEDURE — 3017F COLORECTAL CA SCREEN DOC REV: CPT | Performed by: NURSE PRACTITIONER

## 2018-09-20 PROCEDURE — 1036F TOBACCO NON-USER: CPT | Performed by: NURSE PRACTITIONER

## 2018-09-20 PROCEDURE — 99213 OFFICE O/P EST LOW 20 MIN: CPT | Performed by: NURSE PRACTITIONER

## 2018-09-20 PROCEDURE — 90471 IMMUNIZATION ADMIN: CPT | Performed by: NURSE PRACTITIONER

## 2018-09-20 PROCEDURE — G8427 DOCREV CUR MEDS BY ELIG CLIN: HCPCS | Performed by: NURSE PRACTITIONER

## 2018-09-20 PROCEDURE — 90686 IIV4 VACC NO PRSV 0.5 ML IM: CPT | Performed by: NURSE PRACTITIONER

## 2018-09-20 PROCEDURE — G8417 CALC BMI ABV UP PARAM F/U: HCPCS | Performed by: NURSE PRACTITIONER

## 2018-09-20 RX ORDER — TRAMADOL HYDROCHLORIDE 50 MG/1
50 TABLET ORAL EVERY 8 HOURS PRN
Qty: 90 TABLET | Refills: 0 | Status: SHIPPED | OUTPATIENT
Start: 2018-09-20 | End: 2018-10-20

## 2018-09-20 RX ORDER — PNV NO.95/FERROUS FUM/FOLIC AC 28MG-0.8MG
325 TABLET ORAL DAILY
Qty: 30 TABLET | Refills: 3 | Status: SHIPPED | OUTPATIENT
Start: 2018-09-20 | End: 2019-01-02 | Stop reason: SDUPTHER

## 2018-09-20 RX ORDER — DOXYCYCLINE 100 MG/1
100 TABLET ORAL DAILY
COMMUNITY
Start: 2018-09-16 | End: 2018-10-11

## 2018-09-20 ASSESSMENT — ENCOUNTER SYMPTOMS
SHORTNESS OF BREATH: 0
NAUSEA: 0
CONSTIPATION: 0
DIARRHEA: 0
WHEEZING: 0
VOMITING: 0
COUGH: 0

## 2018-09-20 NOTE — PROGRESS NOTES
Vaccine Information Sheet, \"Influenza - Inactivated\"  given to Hollie Reich, or parent/legal guardian of  Hollie Reich and verbalized understanding. Patient responses:    Have you ever had a reaction to a flu vaccine? No  Are you able to eat eggs without adverse effects? Yes  Do you have any current illness? No  Have you ever had Guillian [place] Syndrome? No    Flu vaccine given per order. Please see immunization tab. Full range of motion of upper and lower extremities, no joint tenderness/swelling.

## 2018-09-20 NOTE — PROGRESS NOTES
HPI:  Patient comes in today for   Chief Complaint   Patient presents with    Headache     past 2-3 weeks she has been getting bad headaches that start in the front, radiate to the middle and to the left side of her head.  Arthritis     pt has arthritis all over. pain is 6-7/10. pt needs refill of meds       Kaylah Bolanos is here today for new onset headaches. Denies nausea and thunderclap type headache. They come on gradually but she feels like she was \"hit by a mack truck\". She is not light sensitive. She has no history of headaches other than occasional sinus headaches. She had a fall in April and hit the left side of her head. She did not go to the hospital for any imaging at that time. Also here for chronic neck and right shoulder pain. Aggravated by daily activity, alleviated by rest. Controlled with current medication. Recent cat bite to left lower extremity. Currently followed by podiatry and on doxycycline. Tetanus needs updated.  in room for visit. Prior to Visit Medications    Medication Sig Taking? Authorizing Provider   doxycycline monohydrate (ADOXA) 100 MG tablet  Yes Historical Provider, MD   traMADol (ULTRAM) 50 MG tablet Take 1 tablet by mouth every 8 hours as needed for Pain for up to 30 days. . Yes Miya Jerez APRN - CNP   Ferrous Sulfate (IRON) 325 (65 Fe) MG TABS Take 325 mg by mouth daily Yes YOKO Redmond - CNP   montelukast (SINGULAIR) 10 MG tablet Take 1 tablet by mouth nightly Yes Miya Jerez APRN - CNP   vitamin D (ERGOCALCIFEROL) 10114 units CAPS capsule Take 1 capsule by mouth once a week Take 50,000 IU every Sunday and 2,000 IU everyday except Sunday. Hold monthly Vitamin D until this Rx done.  Yes Miya Jerez APRN - CNP   ibuprofen (ADVIL;MOTRIN) 200 MG tablet Take 200 mg by mouth every 6 hours as needed for Pain Yes Historical Provider, MD   fluticasone (FLONASE) 50 MCG/ACT nasal spray 1 spray by Nasal route daily Yes Historical Provider, MD headache and arthritis. Patient also examined by Dr. Marianna Fung    Diagnoses and all orders for this visit:    New onset of headaches after age 49  -     CT Head WO Contrast; Future    Recent head trauma, subsequent encounter  -     CT Head WO Contrast; Future    Rheumatoid arthritis involving multiple sites with positive rheumatoid factor (HCC)  -     traMADol (ULTRAM) 50 MG tablet; Take 1 tablet by mouth every 8 hours as needed for Pain for up to 30 days. - The current medical regimen is effective;  continue present plan and medications. Anemia, unspecified type  -     Ferrous Sulfate (IRON) 325 (65 Fe) MG TABS; Take 325 mg by mouth daily  - The current medical regimen is effective;  continue present plan and medications. Immunization due  -     INFLUENZA, QUADV, 3 YRS AND OLDER, IM, PF, PREFILL SYR OR SDV, 0.5ML (FLUZONE QUADV, PF)  - Update Tdap at pharmacy    Controlled Substances Monitoring:     RX Monitoring 9/20/2018   Attestation The Prescription Monitoring Report for this patient was reviewed today. Documentation No signs of potential drug abuse or diversion identified. Chronic Pain Reviewed the patient's functional status and documentation.

## 2018-09-27 ENCOUNTER — HOSPITAL ENCOUNTER (OUTPATIENT)
Dept: CT IMAGING | Age: 63
Discharge: HOME | End: 2018-09-27
Payer: COMMERCIAL

## 2018-09-27 DIAGNOSIS — R51.9 NEW ONSET OF HEADACHES AFTER AGE 50: ICD-10-CM

## 2018-09-27 DIAGNOSIS — S09.90XD RECENT HEAD TRAUMA, SUBSEQUENT ENCOUNTER: ICD-10-CM

## 2018-09-27 PROCEDURE — 70450 CT HEAD/BRAIN W/O DYE: CPT

## 2018-10-08 ENCOUNTER — OFFICE VISIT (OUTPATIENT)
Dept: FAMILY MEDICINE CLINIC | Age: 63
End: 2018-10-08
Payer: COMMERCIAL

## 2018-10-08 VITALS
DIASTOLIC BLOOD PRESSURE: 70 MMHG | TEMPERATURE: 98.3 F | WEIGHT: 172 LBS | RESPIRATION RATE: 16 BRPM | HEART RATE: 76 BPM | OXYGEN SATURATION: 98 % | BODY MASS INDEX: 31.65 KG/M2 | SYSTOLIC BLOOD PRESSURE: 110 MMHG | HEIGHT: 62 IN

## 2018-10-08 DIAGNOSIS — E11.621 DIABETIC ULCER OF TOE OF LEFT FOOT ASSOCIATED WITH TYPE 2 DIABETES MELLITUS, WITH FAT LAYER EXPOSED (HCC): ICD-10-CM

## 2018-10-08 DIAGNOSIS — M06.9 RHEUMATOID ARTHRITIS INVOLVING BOTH HANDS, UNSPECIFIED RHEUMATOID FACTOR PRESENCE: Primary | ICD-10-CM

## 2018-10-08 DIAGNOSIS — M86.9 OSTEOMYELITIS OF LEFT FOOT, UNSPECIFIED TYPE (HCC): ICD-10-CM

## 2018-10-08 DIAGNOSIS — L97.522 DIABETIC ULCER OF TOE OF LEFT FOOT ASSOCIATED WITH TYPE 2 DIABETES MELLITUS, WITH FAT LAYER EXPOSED (HCC): ICD-10-CM

## 2018-10-08 DIAGNOSIS — Z01.818 PRE-OPERATIVE EXAMINATION: ICD-10-CM

## 2018-10-08 PROCEDURE — 3044F HG A1C LEVEL LT 7.0%: CPT | Performed by: FAMILY MEDICINE

## 2018-10-08 PROCEDURE — G8427 DOCREV CUR MEDS BY ELIG CLIN: HCPCS | Performed by: FAMILY MEDICINE

## 2018-10-08 PROCEDURE — 99214 OFFICE O/P EST MOD 30 MIN: CPT | Performed by: FAMILY MEDICINE

## 2018-10-08 PROCEDURE — 3017F COLORECTAL CA SCREEN DOC REV: CPT | Performed by: FAMILY MEDICINE

## 2018-10-08 PROCEDURE — 93000 ELECTROCARDIOGRAM COMPLETE: CPT | Performed by: FAMILY MEDICINE

## 2018-10-08 PROCEDURE — 1036F TOBACCO NON-USER: CPT | Performed by: FAMILY MEDICINE

## 2018-10-08 PROCEDURE — 2022F DILAT RTA XM EVC RTNOPTHY: CPT | Performed by: FAMILY MEDICINE

## 2018-10-08 PROCEDURE — G8417 CALC BMI ABV UP PARAM F/U: HCPCS | Performed by: FAMILY MEDICINE

## 2018-10-08 PROCEDURE — G8482 FLU IMMUNIZE ORDER/ADMIN: HCPCS | Performed by: FAMILY MEDICINE

## 2018-10-08 ASSESSMENT — ENCOUNTER SYMPTOMS
COUGH: 0
HEARTBURN: 0
NAUSEA: 0
HEMOPTYSIS: 0
BLURRED VISION: 0

## 2018-10-11 RX ORDER — SODIUM CHLORIDE 9 MG/ML
INJECTION, SOLUTION INTRAVENOUS CONTINUOUS
Status: CANCELLED | OUTPATIENT
Start: 2018-10-19

## 2018-10-15 ENCOUNTER — HOSPITAL ENCOUNTER (OUTPATIENT)
Dept: PREADMISSION TESTING | Age: 63
Discharge: HOME | End: 2018-10-15
Payer: COMMERCIAL

## 2018-10-15 VITALS
TEMPERATURE: 97.8 F | SYSTOLIC BLOOD PRESSURE: 114 MMHG | RESPIRATION RATE: 20 BRPM | OXYGEN SATURATION: 98 % | WEIGHT: 171 LBS | HEART RATE: 70 BPM | BODY MASS INDEX: 31.28 KG/M2 | DIASTOLIC BLOOD PRESSURE: 66 MMHG

## 2018-10-15 DIAGNOSIS — E11.621 DIABETIC ULCER OF TOE OF LEFT FOOT ASSOCIATED WITH TYPE 2 DIABETES MELLITUS, WITH FAT LAYER EXPOSED (HCC): Primary | ICD-10-CM

## 2018-10-15 DIAGNOSIS — L97.522 DIABETIC ULCER OF TOE OF LEFT FOOT ASSOCIATED WITH TYPE 2 DIABETES MELLITUS, WITH FAT LAYER EXPOSED (HCC): Primary | ICD-10-CM

## 2018-10-15 LAB
ANION GAP SERPL CALCULATED.3IONS-SCNC: 11 MMOL/L (ref 7–16)
BUN BLDV-MCNC: 16 MG/DL (ref 8–23)
CALCIUM SERPL-MCNC: 8.9 MG/DL (ref 8.6–10.2)
CHLORIDE BLD-SCNC: 99 MMOL/L (ref 98–107)
CO2: 30 MMOL/L (ref 22–29)
CREAT SERPL-MCNC: 0.9 MG/DL (ref 0.5–1)
GFR AFRICAN AMERICAN: >60
GFR NON-AFRICAN AMERICAN: >60 ML/MIN/1.73
GLUCOSE BLD-MCNC: 121 MG/DL (ref 74–109)
HCT VFR BLD CALC: 29.8 % (ref 34–48)
HEMOGLOBIN: 9.6 G/DL (ref 11.5–15.5)
MCH RBC QN AUTO: 28.7 PG (ref 26–35)
MCHC RBC AUTO-ENTMCNC: 32.2 % (ref 32–34.5)
MCV RBC AUTO: 89 FL (ref 80–99.9)
PDW BLD-RTO: 14.2 FL (ref 11.5–15)
PLATELET # BLD: 198 E9/L (ref 130–450)
PMV BLD AUTO: 10.4 FL (ref 7–12)
POTASSIUM REFLEX MAGNESIUM: 4 MMOL/L (ref 3.5–5)
RBC # BLD: 3.35 E12/L (ref 3.5–5.5)
SODIUM BLD-SCNC: 140 MMOL/L (ref 132–146)
WBC # BLD: 5.3 E9/L (ref 4.5–11.5)

## 2018-10-15 PROCEDURE — 36415 COLL VENOUS BLD VENIPUNCTURE: CPT

## 2018-10-15 PROCEDURE — 80048 BASIC METABOLIC PNL TOTAL CA: CPT

## 2018-10-15 PROCEDURE — 85027 COMPLETE CBC AUTOMATED: CPT

## 2018-10-15 ASSESSMENT — PAIN DESCRIPTION - DESCRIPTORS: DESCRIPTORS: ACHING

## 2018-10-15 ASSESSMENT — PAIN DESCRIPTION - PROGRESSION: CLINICAL_PROGRESSION: NOT CHANGED

## 2018-10-15 ASSESSMENT — PAIN DESCRIPTION - PAIN TYPE: TYPE: NEUROPATHIC PAIN;CHRONIC PAIN

## 2018-10-15 ASSESSMENT — PAIN SCALES - GENERAL: PAINLEVEL_OUTOF10: 6

## 2018-10-15 ASSESSMENT — PAIN DESCRIPTION - LOCATION: LOCATION: BACK;NECK;HEAD

## 2018-10-15 ASSESSMENT — PAIN DESCRIPTION - ORIENTATION: ORIENTATION: LEFT

## 2018-10-15 ASSESSMENT — PAIN DESCRIPTION - ONSET: ONSET: ON-GOING

## 2018-10-15 NOTE — PROGRESS NOTES
[id number]                                                                                                                     PRE OP INSTRUCTIONS FOR  Sam Palencia        Date: 10/15/2018    Date and time of surgery: 10/19/18     Arrival Time: to be called night before    1. Do not eat or drink anything after 12 midnight prior to surgery. This includes no water, chewing gum, mints or ice chips. 2. Take the following pills with a small sip of water on the morning of Surgery:  Tramadol    3. Diabetics may take evening dose of insulin but none after midnight. If you feel symptomatic or low blood sugar take 1-2 ounces of apple juice only. 4. Aspirin, Ibuprofen, Advil, Naproxen, Vitamin E and other Anti-inflammatory products should be stopped  before surgery  as directed by your physician. 5. Check with your Doctor regarding stopping Plavix, Coumadin, Lovenox, Fragmin or other blood thinners. 6. Do not smoke,use illicit drugs and do not drink any alcoholic beverages 24 hours prior to surgery. 7. You may brush your teeth and gargle the morning of surgery. DO NOT SWALLOW WATER    8. You MUST make arrangements for a responsible adult to take you home after your surgery. You will not be allowed to leave alone or drive yourself home. It is strongly suggested someone stay with you the first 24 hrs. Your surgery will be cancelled if you do not have a ride home. 9. A parent/legal guardian must accompany a child scheduled for surgery and plan to stay at the hospital until the child is discharged. Please do not bring other children with you. 10. Please wear simple, loose fitting clothing to the hospital.  Mariam Hille not bring valuables (money, credit cards, checkbooks, etc.) Do not wear any makeup (including no eye makeup) or nail polish on your fingers or toes. 11. DO NOT wear any jewelry or piercings on day of surgery. All body piercing jewelry must be removed. 12.  Shower the night before

## 2018-10-19 ENCOUNTER — HOSPITAL ENCOUNTER (OUTPATIENT)
Age: 63
Setting detail: OUTPATIENT SURGERY
Discharge: HOME | End: 2018-10-19
Attending: PODIATRIST | Admitting: PODIATRIST
Payer: COMMERCIAL

## 2018-10-19 ENCOUNTER — ANESTHESIA (OUTPATIENT)
Dept: OPERATING ROOM | Age: 63
End: 2018-10-19
Payer: COMMERCIAL

## 2018-10-19 ENCOUNTER — ANESTHESIA EVENT (OUTPATIENT)
Dept: OPERATING ROOM | Age: 63
End: 2018-10-19
Payer: COMMERCIAL

## 2018-10-19 ENCOUNTER — HOSPITAL ENCOUNTER (OUTPATIENT)
Dept: GENERAL RADIOLOGY | Age: 63
Discharge: HOME | End: 2018-10-21
Payer: COMMERCIAL

## 2018-10-19 VITALS
OXYGEN SATURATION: 97 % | DIASTOLIC BLOOD PRESSURE: 72 MMHG | HEIGHT: 62 IN | BODY MASS INDEX: 31.47 KG/M2 | WEIGHT: 171 LBS | RESPIRATION RATE: 16 BRPM | HEART RATE: 74 BPM | SYSTOLIC BLOOD PRESSURE: 148 MMHG | TEMPERATURE: 97.8 F

## 2018-10-19 VITALS
RESPIRATION RATE: 17 BRPM | DIASTOLIC BLOOD PRESSURE: 61 MMHG | OXYGEN SATURATION: 100 % | SYSTOLIC BLOOD PRESSURE: 119 MMHG

## 2018-10-19 DIAGNOSIS — L97.529 DIABETIC ULCER OF TOE OF LEFT FOOT ASSOCIATED WITH DIABETES MELLITUS OF OTHER TYPE, UNSPECIFIED ULCER STAGE (HCC): ICD-10-CM

## 2018-10-19 DIAGNOSIS — E13.621 DIABETIC ULCER OF TOE OF LEFT FOOT ASSOCIATED WITH DIABETES MELLITUS OF OTHER TYPE, UNSPECIFIED ULCER STAGE (HCC): ICD-10-CM

## 2018-10-19 DIAGNOSIS — G89.18 POSTOPERATIVE PAIN: Primary | ICD-10-CM

## 2018-10-19 LAB — METER GLUCOSE: 118 MG/DL (ref 70–110)

## 2018-10-19 PROCEDURE — 6370000000 HC RX 637 (ALT 250 FOR IP): Performed by: ANESTHESIOLOGY

## 2018-10-19 PROCEDURE — 7100000010 HC PHASE II RECOVERY - FIRST 15 MIN: Performed by: PODIATRIST

## 2018-10-19 PROCEDURE — 82962 GLUCOSE BLOOD TEST: CPT

## 2018-10-19 PROCEDURE — 2709999900 HC NON-CHARGEABLE SUPPLY: Performed by: PODIATRIST

## 2018-10-19 PROCEDURE — 2500000003 HC RX 250 WO HCPCS: Performed by: PODIATRIST

## 2018-10-19 PROCEDURE — 3600000013 HC SURGERY LEVEL 3 ADDTL 15MIN: Performed by: PODIATRIST

## 2018-10-19 PROCEDURE — 87070 CULTURE OTHR SPECIMN AEROBIC: CPT

## 2018-10-19 PROCEDURE — 6360000002 HC RX W HCPCS: Performed by: NURSE ANESTHETIST, CERTIFIED REGISTERED

## 2018-10-19 PROCEDURE — 3700000000 HC ANESTHESIA ATTENDED CARE: Performed by: PODIATRIST

## 2018-10-19 PROCEDURE — 88311 DECALCIFY TISSUE: CPT

## 2018-10-19 PROCEDURE — 87205 SMEAR GRAM STAIN: CPT

## 2018-10-19 PROCEDURE — 2580000003 HC RX 258: Performed by: ANESTHESIOLOGY

## 2018-10-19 PROCEDURE — 7100000011 HC PHASE II RECOVERY - ADDTL 15 MIN: Performed by: PODIATRIST

## 2018-10-19 PROCEDURE — 88305 TISSUE EXAM BY PATHOLOGIST: CPT

## 2018-10-19 PROCEDURE — 3600000003 HC SURGERY LEVEL 3 BASE: Performed by: PODIATRIST

## 2018-10-19 PROCEDURE — 6360000002 HC RX W HCPCS: Performed by: PODIATRIST

## 2018-10-19 PROCEDURE — 3700000001 HC ADD 15 MINUTES (ANESTHESIA): Performed by: PODIATRIST

## 2018-10-19 RX ORDER — FENTANYL CITRATE 50 UG/ML
50 INJECTION, SOLUTION INTRAMUSCULAR; INTRAVENOUS EVERY 5 MIN PRN
Status: DISCONTINUED | OUTPATIENT
Start: 2018-10-19 | End: 2018-10-19 | Stop reason: HOSPADM

## 2018-10-19 RX ORDER — MEPERIDINE HYDROCHLORIDE 25 MG/ML
12.5 INJECTION INTRAMUSCULAR; INTRAVENOUS; SUBCUTANEOUS EVERY 5 MIN PRN
Status: DISCONTINUED | OUTPATIENT
Start: 2018-10-19 | End: 2018-10-19 | Stop reason: HOSPADM

## 2018-10-19 RX ORDER — HYDROCODONE BITARTRATE AND ACETAMINOPHEN 5; 325 MG/1; MG/1
1 TABLET ORAL EVERY 6 HOURS PRN
Qty: 28 TABLET | Refills: 0 | Status: SHIPPED | OUTPATIENT
Start: 2018-10-19 | End: 2018-10-26

## 2018-10-19 RX ORDER — BUPIVACAINE HYDROCHLORIDE 5 MG/ML
INJECTION, SOLUTION EPIDURAL; INTRACAUDAL PRN
Status: DISCONTINUED | OUTPATIENT
Start: 2018-10-19 | End: 2018-10-19 | Stop reason: HOSPADM

## 2018-10-19 RX ORDER — ONDANSETRON 2 MG/ML
4 INJECTION INTRAMUSCULAR; INTRAVENOUS
Status: DISCONTINUED | OUTPATIENT
Start: 2018-10-19 | End: 2018-10-19 | Stop reason: HOSPADM

## 2018-10-19 RX ORDER — DOXYCYCLINE HYCLATE 100 MG
100 TABLET ORAL 2 TIMES DAILY
Qty: 20 TABLET | Refills: 0 | Status: SHIPPED | OUTPATIENT
Start: 2018-10-19 | End: 2018-10-29

## 2018-10-19 RX ORDER — HYDROCODONE BITARTRATE AND ACETAMINOPHEN 5; 325 MG/1; MG/1
1 TABLET ORAL ONCE
Status: COMPLETED | OUTPATIENT
Start: 2018-10-19 | End: 2018-10-19

## 2018-10-19 RX ORDER — FENTANYL CITRATE 50 UG/ML
INJECTION, SOLUTION INTRAMUSCULAR; INTRAVENOUS PRN
Status: DISCONTINUED | OUTPATIENT
Start: 2018-10-19 | End: 2018-10-19 | Stop reason: SDUPTHER

## 2018-10-19 RX ORDER — MIDAZOLAM HYDROCHLORIDE 1 MG/ML
INJECTION INTRAMUSCULAR; INTRAVENOUS PRN
Status: DISCONTINUED | OUTPATIENT
Start: 2018-10-19 | End: 2018-10-19 | Stop reason: SDUPTHER

## 2018-10-19 RX ORDER — SODIUM CHLORIDE 9 MG/ML
INJECTION, SOLUTION INTRAVENOUS CONTINUOUS
Status: DISCONTINUED | OUTPATIENT
Start: 2018-10-19 | End: 2018-10-19 | Stop reason: HOSPADM

## 2018-10-19 RX ORDER — FENTANYL CITRATE 50 UG/ML
25 INJECTION, SOLUTION INTRAMUSCULAR; INTRAVENOUS EVERY 5 MIN PRN
Status: DISCONTINUED | OUTPATIENT
Start: 2018-10-19 | End: 2018-10-19 | Stop reason: HOSPADM

## 2018-10-19 RX ORDER — PROPOFOL 10 MG/ML
INJECTION, EMULSION INTRAVENOUS CONTINUOUS PRN
Status: DISCONTINUED | OUTPATIENT
Start: 2018-10-19 | End: 2018-10-19 | Stop reason: SDUPTHER

## 2018-10-19 RX ADMIN — PROPOFOL 75 MCG/KG/MIN: 10 INJECTION, EMULSION INTRAVENOUS at 11:31

## 2018-10-19 RX ADMIN — MIDAZOLAM 2 MG: 1 INJECTION INTRAMUSCULAR; INTRAVENOUS at 11:31

## 2018-10-19 RX ADMIN — CEFAZOLIN SODIUM 2 G: 2 SOLUTION INTRAVENOUS at 11:35

## 2018-10-19 RX ADMIN — SODIUM CHLORIDE: 9 INJECTION, SOLUTION INTRAVENOUS at 09:38

## 2018-10-19 RX ADMIN — HYDROCODONE BITARTRATE AND ACETAMINOPHEN 1 TABLET: 5; 325 TABLET ORAL at 12:47

## 2018-10-19 RX ADMIN — FENTANYL CITRATE 25 MCG: 50 INJECTION, SOLUTION INTRAMUSCULAR; INTRAVENOUS at 11:31

## 2018-10-19 ASSESSMENT — PULMONARY FUNCTION TESTS
PIF_VALUE: 1
PIF_VALUE: 0
PIF_VALUE: 1
PIF_VALUE: 0
PIF_VALUE: 1
PIF_VALUE: 2
PIF_VALUE: 1

## 2018-10-19 ASSESSMENT — PAIN SCALES - GENERAL: PAINLEVEL_OUTOF10: 6

## 2018-10-19 ASSESSMENT — PAIN - FUNCTIONAL ASSESSMENT: PAIN_FUNCTIONAL_ASSESSMENT: 0-10

## 2018-10-19 ASSESSMENT — PAIN DESCRIPTION - DESCRIPTORS: DESCRIPTORS: ACHING

## 2018-10-21 LAB
CULTURE SURGICAL: NORMAL
GRAM STAIN RESULT: NORMAL

## 2018-10-30 ENCOUNTER — OFFICE VISIT (OUTPATIENT)
Dept: FAMILY MEDICINE CLINIC | Age: 63
End: 2018-10-30
Payer: COMMERCIAL

## 2018-10-30 VITALS
HEART RATE: 75 BPM | HEIGHT: 62 IN | TEMPERATURE: 98.2 F | DIASTOLIC BLOOD PRESSURE: 76 MMHG | RESPIRATION RATE: 16 BRPM | OXYGEN SATURATION: 98 % | BODY MASS INDEX: 32.02 KG/M2 | WEIGHT: 174 LBS | SYSTOLIC BLOOD PRESSURE: 132 MMHG

## 2018-10-30 DIAGNOSIS — M45.9 RHEUMATOID ARTHRITIS INVOLVING VERTEBRA WITH POSITIVE RHEUMATOID FACTOR (HCC): Primary | ICD-10-CM

## 2018-10-30 PROCEDURE — 1036F TOBACCO NON-USER: CPT | Performed by: NURSE PRACTITIONER

## 2018-10-30 PROCEDURE — G8482 FLU IMMUNIZE ORDER/ADMIN: HCPCS | Performed by: NURSE PRACTITIONER

## 2018-10-30 PROCEDURE — G8417 CALC BMI ABV UP PARAM F/U: HCPCS | Performed by: NURSE PRACTITIONER

## 2018-10-30 PROCEDURE — G8427 DOCREV CUR MEDS BY ELIG CLIN: HCPCS | Performed by: NURSE PRACTITIONER

## 2018-10-30 PROCEDURE — 99213 OFFICE O/P EST LOW 20 MIN: CPT | Performed by: NURSE PRACTITIONER

## 2018-10-30 PROCEDURE — 96372 THER/PROPH/DIAG INJ SC/IM: CPT | Performed by: NURSE PRACTITIONER

## 2018-10-30 PROCEDURE — 3017F COLORECTAL CA SCREEN DOC REV: CPT | Performed by: NURSE PRACTITIONER

## 2018-10-30 RX ORDER — METHYLPREDNISOLONE ACETATE 80 MG/ML
80 INJECTION, SUSPENSION INTRA-ARTICULAR; INTRALESIONAL; INTRAMUSCULAR; SOFT TISSUE ONCE
Status: COMPLETED | OUTPATIENT
Start: 2018-10-30 | End: 2018-10-30

## 2018-10-30 RX ORDER — TRAMADOL HYDROCHLORIDE 50 MG/1
50 TABLET ORAL EVERY 8 HOURS PRN
Qty: 90 TABLET | Refills: 0 | Status: SHIPPED | OUTPATIENT
Start: 2018-10-30 | End: 2019-01-02 | Stop reason: SDUPTHER

## 2018-10-30 RX ORDER — TRAMADOL HYDROCHLORIDE 50 MG/1
50 TABLET ORAL EVERY 8 HOURS PRN
COMMUNITY
End: 2018-10-30 | Stop reason: SDUPTHER

## 2018-10-30 RX ADMIN — METHYLPREDNISOLONE ACETATE 80 MG: 80 INJECTION, SUSPENSION INTRA-ARTICULAR; INTRALESIONAL; INTRAMUSCULAR; SOFT TISSUE at 13:51

## 2018-10-30 ASSESSMENT — ENCOUNTER SYMPTOMS
SHORTNESS OF BREATH: 0
CONSTIPATION: 0
DIARRHEA: 0
WHEEZING: 0
VOMITING: 0
NAUSEA: 0
COUGH: 0
BACK PAIN: 1
SINUS PRESSURE: 1
RHINORRHEA: 1

## 2019-01-02 ENCOUNTER — OFFICE VISIT (OUTPATIENT)
Dept: FAMILY MEDICINE CLINIC | Age: 64
End: 2019-01-02
Payer: COMMERCIAL

## 2019-01-02 VITALS
HEIGHT: 62 IN | TEMPERATURE: 98.3 F | DIASTOLIC BLOOD PRESSURE: 68 MMHG | OXYGEN SATURATION: 97 % | RESPIRATION RATE: 16 BRPM | SYSTOLIC BLOOD PRESSURE: 118 MMHG | BODY MASS INDEX: 31.83 KG/M2 | HEART RATE: 76 BPM

## 2019-01-02 DIAGNOSIS — J06.9 VIRAL URI: Primary | ICD-10-CM

## 2019-01-02 DIAGNOSIS — J30.2 SEASONAL ALLERGIES: ICD-10-CM

## 2019-01-02 DIAGNOSIS — D64.9 ANEMIA, UNSPECIFIED TYPE: ICD-10-CM

## 2019-01-02 DIAGNOSIS — M45.9 RHEUMATOID ARTHRITIS INVOLVING VERTEBRA WITH POSITIVE RHEUMATOID FACTOR (HCC): ICD-10-CM

## 2019-01-02 PROCEDURE — 99213 OFFICE O/P EST LOW 20 MIN: CPT | Performed by: NURSE PRACTITIONER

## 2019-01-02 RX ORDER — MONTELUKAST SODIUM 10 MG/1
10 TABLET ORAL NIGHTLY
Qty: 30 TABLET | Refills: 5 | Status: SHIPPED | OUTPATIENT
Start: 2019-01-02 | End: 2019-03-01 | Stop reason: SDUPTHER

## 2019-01-02 RX ORDER — FLUCONAZOLE 150 MG/1
150 TABLET ORAL ONCE
Qty: 1 TABLET | Refills: 1 | Status: SHIPPED | OUTPATIENT
Start: 2019-01-02 | End: 2019-01-02

## 2019-01-02 RX ORDER — TRAMADOL HYDROCHLORIDE 50 MG/1
50 TABLET ORAL EVERY 8 HOURS PRN
COMMUNITY
End: 2019-01-02 | Stop reason: SDUPTHER

## 2019-01-02 RX ORDER — AMOXICILLIN 875 MG/1
875 TABLET, COATED ORAL 2 TIMES DAILY
Qty: 20 TABLET | Refills: 0 | Status: SHIPPED | OUTPATIENT
Start: 2019-01-02 | End: 2019-01-12

## 2019-01-02 RX ORDER — PNV NO.95/FERROUS FUM/FOLIC AC 28MG-0.8MG
325 TABLET ORAL DAILY
Qty: 30 TABLET | Refills: 3 | Status: SHIPPED | OUTPATIENT
Start: 2019-01-02 | End: 2019-03-01 | Stop reason: SDUPTHER

## 2019-01-02 RX ORDER — TRAMADOL HYDROCHLORIDE 50 MG/1
50 TABLET ORAL EVERY 8 HOURS PRN
Qty: 90 TABLET | Refills: 0 | Status: SHIPPED | OUTPATIENT
Start: 2019-01-02 | End: 2019-03-01 | Stop reason: SDUPTHER

## 2019-01-02 RX ORDER — CETIRIZINE HYDROCHLORIDE 10 MG/1
10 TABLET ORAL DAILY
Qty: 30 TABLET | Refills: 3 | Status: SHIPPED | OUTPATIENT
Start: 2019-01-02 | End: 2019-05-17 | Stop reason: SDUPTHER

## 2019-01-02 ASSESSMENT — ENCOUNTER SYMPTOMS
BACK PAIN: 1
NAUSEA: 0
DIARRHEA: 0
SINUS PRESSURE: 1
VOMITING: 0
SORE THROAT: 1
WHEEZING: 0
COUGH: 1
SHORTNESS OF BREATH: 0

## 2019-03-01 ENCOUNTER — OFFICE VISIT (OUTPATIENT)
Dept: FAMILY MEDICINE CLINIC | Age: 64
End: 2019-03-01
Payer: COMMERCIAL

## 2019-03-01 VITALS
HEIGHT: 62 IN | BODY MASS INDEX: 28.71 KG/M2 | DIASTOLIC BLOOD PRESSURE: 66 MMHG | OXYGEN SATURATION: 97 % | SYSTOLIC BLOOD PRESSURE: 124 MMHG | RESPIRATION RATE: 16 BRPM | TEMPERATURE: 98.1 F | WEIGHT: 156 LBS | HEART RATE: 86 BPM

## 2019-03-01 DIAGNOSIS — D64.9 ANEMIA, UNSPECIFIED TYPE: ICD-10-CM

## 2019-03-01 DIAGNOSIS — B96.89 ACUTE BACTERIAL SINUSITIS: Primary | ICD-10-CM

## 2019-03-01 DIAGNOSIS — M45.9 RHEUMATOID ARTHRITIS INVOLVING VERTEBRA WITH POSITIVE RHEUMATOID FACTOR (HCC): ICD-10-CM

## 2019-03-01 DIAGNOSIS — J30.2 SEASONAL ALLERGIES: ICD-10-CM

## 2019-03-01 DIAGNOSIS — J01.90 ACUTE BACTERIAL SINUSITIS: Primary | ICD-10-CM

## 2019-03-01 PROCEDURE — 99213 OFFICE O/P EST LOW 20 MIN: CPT | Performed by: NURSE PRACTITIONER

## 2019-03-01 RX ORDER — MONTELUKAST SODIUM 10 MG/1
10 TABLET ORAL NIGHTLY
Qty: 30 TABLET | Refills: 5 | Status: SHIPPED | OUTPATIENT
Start: 2019-03-01

## 2019-03-01 RX ORDER — GUAIFENESIN 600 MG/1
600 TABLET, EXTENDED RELEASE ORAL 2 TIMES DAILY
Qty: 30 TABLET | Refills: 0 | Status: SHIPPED | OUTPATIENT
Start: 2019-03-01 | End: 2019-04-18

## 2019-03-01 RX ORDER — SULFAMETHOXAZOLE AND TRIMETHOPRIM 800; 160 MG/1; MG/1
1 TABLET ORAL 2 TIMES DAILY
Qty: 20 TABLET | Refills: 0 | Status: SHIPPED | OUTPATIENT
Start: 2019-03-01 | End: 2019-03-11

## 2019-03-01 RX ORDER — PNV NO.95/FERROUS FUM/FOLIC AC 28MG-0.8MG
325 TABLET ORAL DAILY
Qty: 30 TABLET | Refills: 3 | Status: SHIPPED | OUTPATIENT
Start: 2019-03-01

## 2019-03-01 RX ORDER — TRAMADOL HYDROCHLORIDE 50 MG/1
50 TABLET ORAL EVERY 8 HOURS PRN
COMMUNITY
End: 2019-03-01 | Stop reason: SDUPTHER

## 2019-03-01 RX ORDER — TRAMADOL HYDROCHLORIDE 50 MG/1
50 TABLET ORAL EVERY 8 HOURS PRN
Qty: 90 TABLET | Refills: 0 | Status: SHIPPED | OUTPATIENT
Start: 2019-03-01 | End: 2019-04-18 | Stop reason: SDUPTHER

## 2019-03-01 ASSESSMENT — ENCOUNTER SYMPTOMS
WHEEZING: 0
SHORTNESS OF BREATH: 0
DIARRHEA: 0
NAUSEA: 0
SORE THROAT: 0
SINUS PRESSURE: 1
COUGH: 1
VOMITING: 0

## 2019-03-01 ASSESSMENT — PATIENT HEALTH QUESTIONNAIRE - PHQ9
SUM OF ALL RESPONSES TO PHQ QUESTIONS 1-9: 0
1. LITTLE INTEREST OR PLEASURE IN DOING THINGS: 0
SUM OF ALL RESPONSES TO PHQ QUESTIONS 1-9: 0
SUM OF ALL RESPONSES TO PHQ9 QUESTIONS 1 & 2: 0
2. FEELING DOWN, DEPRESSED OR HOPELESS: 0

## 2019-04-18 ENCOUNTER — OFFICE VISIT (OUTPATIENT)
Dept: FAMILY MEDICINE CLINIC | Age: 64
End: 2019-04-18
Payer: COMMERCIAL

## 2019-04-18 VITALS
OXYGEN SATURATION: 97 % | WEIGHT: 145.2 LBS | BODY MASS INDEX: 24.19 KG/M2 | DIASTOLIC BLOOD PRESSURE: 78 MMHG | SYSTOLIC BLOOD PRESSURE: 124 MMHG | HEART RATE: 65 BPM | HEIGHT: 65 IN | TEMPERATURE: 98.5 F | RESPIRATION RATE: 16 BRPM

## 2019-04-18 DIAGNOSIS — M45.9 RHEUMATOID ARTHRITIS INVOLVING VERTEBRA WITH POSITIVE RHEUMATOID FACTOR (HCC): Primary | ICD-10-CM

## 2019-04-18 DIAGNOSIS — E11.9 TYPE 2 DIABETES MELLITUS WITHOUT COMPLICATION, WITHOUT LONG-TERM CURRENT USE OF INSULIN (HCC): ICD-10-CM

## 2019-04-18 DIAGNOSIS — M62.838 MUSCLE SPASM: ICD-10-CM

## 2019-04-18 LAB
AVERAGE GLUCOSE: NORMAL
HBA1C MFR BLD: 5.6 %

## 2019-04-18 PROCEDURE — 99213 OFFICE O/P EST LOW 20 MIN: CPT | Performed by: NURSE PRACTITIONER

## 2019-04-18 RX ORDER — TIZANIDINE HYDROCHLORIDE 2 MG/1
2 CAPSULE, GELATIN COATED ORAL 3 TIMES DAILY PRN
Qty: 90 CAPSULE | Refills: 0 | Status: SHIPPED | OUTPATIENT
Start: 2019-04-18

## 2019-04-18 RX ORDER — FLASH GLUCOSE SENSOR
1 KIT MISCELLANEOUS
Qty: 2 EACH | Refills: 2 | Status: SHIPPED | OUTPATIENT
Start: 2019-04-18 | End: 2019-05-17

## 2019-04-18 RX ORDER — METHYLPREDNISOLONE 4 MG/1
TABLET ORAL
Qty: 1 KIT | Refills: 0 | Status: SHIPPED | OUTPATIENT
Start: 2019-04-18 | End: 2019-05-17

## 2019-04-18 RX ORDER — TRAMADOL HYDROCHLORIDE 50 MG/1
50 TABLET ORAL EVERY 6 HOURS PRN
Qty: 120 TABLET | Refills: 0 | Status: SHIPPED | OUTPATIENT
Start: 2019-04-18 | End: 2019-05-17 | Stop reason: SDUPTHER

## 2019-04-18 RX ORDER — FLASH GLUCOSE SENSOR
1 KIT MISCELLANEOUS ONCE
Qty: 1 DEVICE | Refills: 0 | Status: SHIPPED | OUTPATIENT
Start: 2019-04-18 | End: 2019-04-18

## 2019-04-18 ASSESSMENT — ENCOUNTER SYMPTOMS
COUGH: 0
BACK PAIN: 1
WHEEZING: 0
NAUSEA: 0
SHORTNESS OF BREATH: 0
VOMITING: 0
DIARRHEA: 0
CONSTIPATION: 0

## 2019-04-18 NOTE — PROGRESS NOTES
HPI:  Patient comes in today for   Chief Complaint   Patient presents with    Arthritis     arthritis pain all over. pain is 8/10. pt needs refill of meds    Tingling     tingling in her hands and legs, left leg started last week. pt is also feeling unstable, especially in the am.    Referral - General     would like referral to Dr. Jerzy Patel of numbness and tingling to bilateral hands and trouble holding objects. She is not able to use a lancet for blood sugar readings. She also reports her left leg is giving out on her and she has fell several times. She is also having numbness and tingling down that leg. Seen by neurosurgery in 2017 and did not want surgery then but would like referred back now. Would also like a Ava for blood sugar readings. Pain in neck from RA. History of cervical kyphosis. Pain aggravated by daily activity. Alleviated by heat and attempts ROM but very limited. Pain somewhat controlled by medication. Sister-in-law in the room for visit. Prior to Visit Medications    Medication Sig Taking? Authorizing Provider   traMADol (ULTRAM) 50 MG tablet Take 1 tablet by mouth every 6 hours as needed for Pain for up to 30 days.  Yes YOKO Yates CNP   Continuous Blood Gluc Sensor (FREESTYLE AVA SENSOR SYSTEM) MISC 1 each by Does not apply route every 14 days Yes YOKO Yates CNP   methylPREDNISolone (MEDROL, KOBI,) 4 MG tablet Take by mouth as directed Yes YOKO Yates CNP   tiZANidine (ZANAFLEX) 2 MG capsule Take 1 capsule by mouth 3 times daily as needed for Muscle spasms Yes YOKO Redmond CNP   montelukast (SINGULAIR) 10 MG tablet Take 1 tablet by mouth nightly Yes YOKO Redmond CNP   Ferrous Sulfate (IRON) 325 (65 Fe) MG TABS Take 325 mg by mouth daily Yes YOKO Redmond CNP   cetirizine (ZYRTEC ALLERGY) 10 MG tablet Take 1 tablet by mouth daily Yes YOKO Redmond CNP   ibuprofen (ADVIL;MOTRIN) 200 MG tablet Take 200 mg by mouth every 6 hours as needed for Pain Yes Historical Provider, MD   fluticasone (FLONASE) 50 MCG/ACT nasal spray 1 spray by Nasal route daily Yes Historical Provider, MD   furosemide (LASIX) 20 MG tablet Take 20 mg by mouth daily  Yes Historical Provider, MD       Allergies   Allergen Reactions    Iodine Shortness Of Breath     Wheezing        Review of Systems  Review of Systems   Constitutional: Positive for fatigue. Negative for appetite change. Respiratory: Negative for cough, shortness of breath and wheezing. Cardiovascular: Negative for chest pain and palpitations. Gastrointestinal: Negative for constipation, diarrhea, nausea and vomiting. Genitourinary: Negative for difficulty urinating. Musculoskeletal: Positive for back pain, myalgias and neck pain. Neurological: Positive for light-headedness and headaches. Negative for dizziness and weakness. VS:  /78   Pulse 65   Temp 98.5 °F (36.9 °C) (Oral)   Resp 16   Ht 5' 4.5\" (1.638 m)   Wt 145 lb 3.2 oz (65.9 kg)   SpO2 97%   BMI 24.54 kg/m²     Physical Exam  Physical Exam   Constitutional: She is oriented to person, place, and time. She appears well-developed and well-nourished. HENT:   Head: Normocephalic and atraumatic. Neck: No tracheal deviation present. No thyromegaly present. Cardiovascular: Normal rate and regular rhythm. No murmur heard. Pulmonary/Chest: Effort normal and breath sounds normal.   Abdominal: Soft. Bowel sounds are normal. There is no tenderness. Musculoskeletal: Normal range of motion. She exhibits tenderness and deformity. (Severe cervical kyphosis with almost complete loss of ROM. Bilateral hands with rheumatoid changes,  strength 2/5. Right foot externally rotated 70 degrees. Orthotic shoes, uses cane). Lymphadenopathy:     She has no cervical adenopathy. Neurological: She is alert and oriented to person, place, and time. Skin: Skin is warm and dry. Psychiatric: She has a normal mood and affect. Her behavior is normal.     Assessment/Plan:  Judson Peace was seen today for arthritis, tingling and referral - general.    Diagnoses and all orders for this visit:    Rheumatoid arthritis involving vertebra with positive rheumatoid factor (HCC)  -     MEDICATION INCREASE - traMADol (ULTRAM) 50 MG tablet; Take 1 tablet by mouth every 6 hours as needed for Pain for up to 30 days.  - Reviewed side effects of medication and patient verbalizes understanding.   - Advised to call back directly if there are further questions, or if these symptoms fail to improve as anticipated or worsen. -     Karma Conner MD, Neurosurgery, [place]  -     methylPREDNISolone (MEDROL, KOBI,) 4 MG tablet; Take by mouth as directed  - Reviewed side effects of medication and patient verbalizes understanding.   - Advised to call back directly if there are further questions, or if these symptoms fail to improve as anticipated or worsen.  - STOP Ibuprofen while on steroids  - Heat and ice for pain control    Type 2 diabetes mellitus without complication, without long-term current use of insulin ([facility])  -     Continuous Blood Gluc  (FREESTYLE MISA READER) TIMI; 1 Device by Does not apply route once for 1 dose  -     Continuous Blood Gluc Sensor ([address] [place] [address]) MISC; 1 each by Does not apply route every 14 days  - The patient is asked to make an attempt to improve diet and exercise patterns to aid in medical management of this problem. Muscle spasm  -     tiZANidine (ZANAFLEX) 2 MG capsule; Take 1 capsule by mouth 3 times daily as needed for Muscle spasms  - Reviewed side effects of medication and patient verbalizes understanding.   - Advised to call back directly if there are further questions, or if these symptoms fail to improve as anticipated or worsen.     Greater than 15 minutes was spent with patient and more than 50% of the time was spent face to face counseling and educating regarding diagnoses

## 2019-05-17 ENCOUNTER — TELEPHONE (OUTPATIENT)
Dept: FAMILY MEDICINE CLINIC | Age: 64
End: 2019-05-17

## 2019-05-17 ENCOUNTER — OFFICE VISIT (OUTPATIENT)
Dept: FAMILY MEDICINE CLINIC | Age: 64
End: 2019-05-17
Payer: COMMERCIAL

## 2019-05-17 VITALS
TEMPERATURE: 98.3 F | SYSTOLIC BLOOD PRESSURE: 130 MMHG | WEIGHT: 139 LBS | OXYGEN SATURATION: 95 % | BODY MASS INDEX: 23.16 KG/M2 | DIASTOLIC BLOOD PRESSURE: 84 MMHG | HEIGHT: 65 IN | HEART RATE: 77 BPM | RESPIRATION RATE: 14 BRPM

## 2019-05-17 DIAGNOSIS — M45.9 RHEUMATOID ARTHRITIS INVOLVING VERTEBRA WITH POSITIVE RHEUMATOID FACTOR (HCC): Primary | ICD-10-CM

## 2019-05-17 DIAGNOSIS — J30.2 SEASONAL ALLERGIES: ICD-10-CM

## 2019-05-17 DIAGNOSIS — S91.302A: ICD-10-CM

## 2019-05-17 DIAGNOSIS — L97.522 DIABETIC ULCER OF TOE OF LEFT FOOT ASSOCIATED WITH TYPE 2 DIABETES MELLITUS, WITH FAT LAYER EXPOSED (HCC): Primary | ICD-10-CM

## 2019-05-17 DIAGNOSIS — E11.621 DIABETIC ULCER OF TOE OF LEFT FOOT ASSOCIATED WITH TYPE 2 DIABETES MELLITUS, WITH FAT LAYER EXPOSED (HCC): Primary | ICD-10-CM

## 2019-05-17 PROCEDURE — 99214 OFFICE O/P EST MOD 30 MIN: CPT | Performed by: NURSE PRACTITIONER

## 2019-05-17 RX ORDER — TRAMADOL HYDROCHLORIDE 50 MG/1
50 TABLET ORAL EVERY 6 HOURS PRN
Qty: 120 TABLET | Refills: 0 | Status: SHIPPED | OUTPATIENT
Start: 2019-05-17 | End: 2019-07-26 | Stop reason: SDUPTHER

## 2019-05-17 RX ORDER — CETIRIZINE HYDROCHLORIDE 10 MG/1
10 TABLET ORAL DAILY
Qty: 30 TABLET | Refills: 3 | Status: SHIPPED | OUTPATIENT
Start: 2019-05-17

## 2019-05-17 RX ORDER — TIZANIDINE HYDROCHLORIDE 2 MG/1
2 CAPSULE, GELATIN COATED ORAL 3 TIMES DAILY PRN
Qty: 90 CAPSULE | Refills: 0 | Status: CANCELLED | OUTPATIENT
Start: 2019-05-17

## 2019-05-17 RX ORDER — GABAPENTIN 100 MG/1
100 CAPSULE ORAL NIGHTLY
Qty: 30 CAPSULE | Refills: 1 | Status: SHIPPED | OUTPATIENT
Start: 2019-05-17 | End: 2019-07-26

## 2019-05-17 ASSESSMENT — ENCOUNTER SYMPTOMS
BACK PAIN: 1
VOMITING: 0
SHORTNESS OF BREATH: 0
WHEEZING: 0
NAUSEA: 0
DIARRHEA: 0
COUGH: 0
CONSTIPATION: 0

## 2019-05-17 NOTE — PROGRESS NOTES
HPI:  Patient comes in today for   Chief Complaint   Patient presents with    Arthritis     refills rates pain at 7/10     Numbness     hands ongoing issue        Judson Peace presents today with pain due to her severe, deforming RA. Her pain is better controlled since increasing her medication last visit. She has only used the zanaflex a few times. She is also still having numbness and tingling in her bilateral hands. Referral out to Deneen Comes. Did better with steroids but pain and numbness came back almost immediately. Sister in law in room for visit. She is an RN and will be helping to care for Judson Peace closer. Prior to Visit Medications    Medication Sig Taking? Authorizing Provider   traMADol (ULTRAM) 50 MG tablet Take 1 tablet by mouth every 6 hours as needed for Pain for up to 30 days. Yes YOKO Redmond CNP   cetirizine (ZYRTEC ALLERGY) 10 MG tablet Take 1 tablet by mouth daily Yes YOKO Redmond CNP   gabapentin (NEURONTIN) 100 MG capsule Take 1 capsule by mouth nightly for 180 days. Intended supply: 90 days Yes YOKO Redmond CNP   tiZANidine (ZANAFLEX) 2 MG capsule Take 1 capsule by mouth 3 times daily as needed for Muscle spasms Yes YOKO Redmond CNP   montelukast (SINGULAIR) 10 MG tablet Take 1 tablet by mouth nightly Yes YOKO Redmond CNP   Ferrous Sulfate (IRON) 325 (65 Fe) MG TABS Take 325 mg by mouth daily Yes YOKO Redmond CNP   ibuprofen (ADVIL;MOTRIN) 200 MG tablet Take 200 mg by mouth every 6 hours as needed for Pain Yes Historical Provider, MD   fluticasone (FLONASE) 50 MCG/ACT nasal spray 1 spray by Nasal route daily Yes Historical Provider, MD   furosemide (LASIX) 20 MG tablet Take 20 mg by mouth daily  Yes Historical Provider, MD       Allergies   Allergen Reactions    Iodine Shortness Of Breath     Wheezing      Review of Systems  Review of Systems   Constitutional: Positive for fatigue. Negative for appetite change. traMADol (ULTRAM) 50 MG tablet; Take 1 tablet by mouth every 6 hours as needed for Pain for up to 30 days. - The current medical regimen is effective;  continue present plan and medications.  -     NEW MEDICATION - gabapentin (NEURONTIN) 100 MG capsule; Take 1 capsule by mouth nightly for 180 days. Intended supply: 90 days  - Reviewed side effects of medication and patient verbalizes understanding.   - Advised to call back directly if there are further questions, or if these symptoms fail to improve as anticipated or worsen. - Continue with heat and ice for pain relief    Seasonal allergies  -     cetirizine (ZYRTEC ALLERGY) 10 MG tablet; Take 1 tablet by mouth daily  - The current medical regimen is effective;  continue present plan and medications. Non-healing open wound of heel, left, initial encounter  -     [address] [facility] Lionel Malone to wound until evaluated at wound care    Greater than 25 minutes was spent with patient and more than 50% of the time was spent face to face counseling and educating regarding diagnoses. Controlled Substances Monitoring:     RX Monitoring 5/17/2019   Attestation The Prescription Monitoring Report for this patient was reviewed today. Chronic Pain Routine Monitoring Possible medication side effects, risk of tolerance/dependence & alternative treatments discussed. ;No signs of potential drug abuse or diversion identified: otherwise, see note documentation   Chronic Pain > 80 MEDD -

## 2019-05-20 ENCOUNTER — CARE COORDINATION (OUTPATIENT)
Dept: CARE COORDINATION | Age: 64
End: 2019-05-20

## 2019-05-20 NOTE — LETTER
5/20/2019        Agata Morrissey   [address] [facility]       Dear Agata Morrissey,    My name is Lupillo Couch and I am a registered nurse who partners with YOKO Martines CNP to improve patients' health. YOKO Martines CNP believes you would benefit from working with me. As a member of your health care team, I would work with other providers involved in your care, offer education for your specific health conditions, and connect you with additional resources as needed. I will collaborate with YOKO Martines CNP to support you in following your treatment plan. The additional support I provide is no additional cost to you. My primary focus is to help you achieve specific goals and improve your health. We are committed to walk with you on this journey and look forward to working with you. Please call me to further discuss your healthcare needs. I am available by phone or for appointments at the office. You can reach me at [phone].     In good health,     Lupillo Couch RN  Nurse Care Coordinator

## 2019-05-20 NOTE — CARE COORDINATION
Ambulatory Care Coordination Note  5/20/2019  CM Risk Score: 5  Lor Mortality Risk Score:      ACC: Berlinda Schlatter, RN    Summary Note: Called Pt as requested by PCP to discuss needs. Pt states she feels she may need help with personal care such as bathing. Her  helps her most of the time. She states she does not have a  at [address] She does not drive but her  is able to take her to all of her appts   She does not have to physically care for her daughter. She feels she has all the equipment she needs at home. She is waiting for the pharmacy to fill she  Prescriptions they had to order them, but should be ready today. She waiting for a call back from the Neurology and wound care offices for appts. Will refer to [facility] will mail out [address] intro letter. Ambulatory Care Coordination Assessment    Care Coordination Protocol  Program Enrollment:  Rising Risk  Referral from Primary Care Provider:  Yes  Week 1 - Initial Assessment     Do you have all of your prescriptions and are they filled?:  No (Comment: Waiting on 2 new RX to be filled)  Barriers to medication adherence:  None  Are you able to afford your medications?:  Yes  How often do you have trouble taking your medications the way you have been told to take them?:  I always take them as prescribed. Do you have Home O2 Therapy?:  No      Ability to seek help/take action for Emergent Urgent situations i.e. fire, crime, inclement weather or health crisis. :  Independent  Ability to ambulate to restroom:  Independent  Ability handle personal hygeine needs (bathing/dressing/grooming):  Needs Assistance  Ability to manage Medications: Independent  Ability to prepare Food Preparation:  Needs Assistance  Ability to maintain home (clean home, laundry):  Dependent  Ability to drive and/or has transportation:  Dependent  Ability to do shopping:  Needs Assistance  Ability to manage finances:   Independent  Is patient able to live difficulties, concentration):  Clear and open communication, no identified barriers   Do other services need to be involved to help this patient?:  Other care/services in place but not sufficient   Are current services involved with this patient well-coordinated? (Include coordination with other services you are now recommendation):  Required care/services in place with some coordination barriers   Suggested Interventions and Community Resources  Diabetes Education:  Completed (Comment: Pt states completed previously)    Registered Dietician: In Process   Senior Services: In Process   Specialty Service Referral:  Completed   Zone Management Tools:  Completed         Set up/Review Goals, Set up/Review an Education Plan, Schedule an appointment with the patient's PCP              Prior to Admission medications    Medication Sig Start Date End Date Taking? Authorizing Provider   traMADol (ULTRAM) 50 MG tablet Take 1 tablet by mouth every 6 hours as needed for Pain for up to 30 days. 5/17/19 6/16/19  YOKO Car CNP   cetirizine (ZYRTEC ALLERGY) 10 MG tablet Take 1 tablet by mouth daily 5/17/19   YOKO Ramos CNP   gabapentin (NEURONTIN) 100 MG capsule Take 1 capsule by mouth nightly for 180 days.  Intended supply: 90 days 5/17/19 11/13/19  YOKO Ramos CNP   tiZANidine (ZANAFLEX) 2 MG capsule Take 1 capsule by mouth 3 times daily as needed for Muscle spasms 4/18/19   YOKO Ramos CNP   montelukast (SINGULAIR) 10 MG tablet Take 1 tablet by mouth nightly 3/1/19   YOKO Ramos CNP   Ferrous Sulfate (IRON) 325 (65 Fe) MG TABS Take 325 mg by mouth daily 3/1/19   YOKO Ramos CNP   ibuprofen (ADVIL;MOTRIN) 200 MG tablet Take 200 mg by mouth every 6 hours as needed for Pain    Historical Provider, MD   fluticasone (FLONASE) 50 MCG/ACT nasal spray 1 spray by Nasal route daily    Historical Provider, MD   furosemide (LASIX) 20 MG tablet Take 20 mg by mouth daily  6/5/17   Historical Provider, MD       No future appointments. ,   Diabetes Assessment    Meal Planning:  Avoidance of concentrated sweets   How often do you test your blood sugar?:  Daily   Do you have barriers with adherence to non-pharmacologic self-management interventions?  (Nutrition/Exercise/Self-Monitoring):  No   Have you ever had to go to the ED for symptoms of low blood sugar?:  No       Unhealing or open wounds   Do you have hyperglycemia symptoms?:  No   Do you have hypoglycemia symptoms?:  No   Last Blood Sugar Value:  98   Blood Sugar Monitoring Regimen:  Morning Fasting   Blood Sugar Trends:  No Change (Comment: General range- )       and   General Assessment    Do you have any symptoms that are causing concern?:  Yes  Progression since Onset:  Unchanged, Gradually Worsening  Reported Symptoms:  Pain, Other (Comment: bilat hand pain and mobility)

## 2019-05-29 ENCOUNTER — CARE COORDINATION (OUTPATIENT)
Dept: CARE COORDINATION | Age: 64
End: 2019-05-29

## 2019-05-29 NOTE — CARE COORDINATION
Ambulatory Care Coordination Note  5/29/2019  CM Risk Score: 5  Lor Mortality Risk Score:      ACC: Nasrin Ocasio RN    Summary Note: Called pt to follow up on progress, discuss new issues or concerns, and reinforce/ provide pt education. Pt states overall she is doing about the same. No new issues or concerns. She has an appt on 6/6/19 with wound care. She still needs to make her appt with the podiatrist to get her diabetic shoe. She did get the information that was sent to her after last contact. DM- pt has not called dietician yet but does plan on it. She denies any changes in her BG and is taking her medications and testing BG as directed. -Reinforced/ Provided Education on:  Importance and benefits of medication, diet, and self monitoring compliance. Reinforced S/S  To report. Reinforced importance and benefit of compliance with  and routine appts. Plan: Will follow up after wound care appt to discus POC and any ongoing needs. Pt verbalized understanding and is agreeable to follow up contact. Care Coordination Interventions    Program Enrollment:  Rising Risk  Referral from Primary Care Provider:  Yes  Suggested Interventions and Community Resources  Diabetes Education:  Completed (Comment: Pt states completed previously)  Registered Dietician:  Completed (Comment: 5/20/19- Mailed Dial-a -dietician resource)  Senior Services:  Completed (Comment: 5/20/19- [facility] info mailed out)  Specialty Services Referral:  Completed (Comment: 5/20/19- to Neurology and Wound Care)  Other Services: In Process  Zone Management Tools:  Completed (Comment: 5/20/19: Mailed DM Zone Sheet)         Goals Addressed                 This Visit's Progress     Community Resource Goal   On track     I will complete referral to [facility] for assistance.      Barriers: lack of support and overwhelmed by complexity of regimen  Plan for overcoming my barriers: Will work with [facility]   Confidence: 8/10  Anticipated Goal Completion Date: 6/15/19       Conditions and Symptoms   On track     I will schedule office visits, as directed by my provider. I will notify my provider of any barriers to my plan of care. I will notify my provider of any symptoms that indicate a worsening of my condition. Barriers: lack of support, overwhelmed by complexity of regimen and stress  Plan for overcoming my barriers: Will work with [facility], PCP, and family  Confidence: 7/10  Anticipated Goal Completion Date: 6/30/19              Prior to Admission medications    Medication Sig Start Date End Date Taking? Authorizing Provider   traMADol (ULTRAM) 50 MG tablet Take 1 tablet by mouth every 6 hours as needed for Pain for up to 30 days. 5/17/19 6/16/19  YOKO Shah CNP   cetirizine (ZYRTEC ALLERGY) 10 MG tablet Take 1 tablet by mouth daily 5/17/19   YOKO Pires CNP   gabapentin (NEURONTIN) 100 MG capsule Take 1 capsule by mouth nightly for 180 days.  Intended supply: 90 days 5/17/19 11/13/19  YOKO Pires CNP   tiZANidine (ZANAFLEX) 2 MG capsule Take 1 capsule by mouth 3 times daily as needed for Muscle spasms 4/18/19   YOKO Pires CNP   montelukast (SINGULAIR) 10 MG tablet Take 1 tablet by mouth nightly 3/1/19   YOKO Pires CNP   Ferrous Sulfate (IRON) 325 (65 Fe) MG TABS Take 325 mg by mouth daily 3/1/19   YOKO Pires CNP   ibuprofen (ADVIL;MOTRIN) 200 MG tablet Take 200 mg by mouth every 6 hours as needed for Pain    Historical Provider, MD   fluticasone (FLONASE) 50 MCG/ACT nasal spray 1 spray by Nasal route daily    Historical Provider, MD   furosemide (LASIX) 20 MG tablet Take 20 mg by mouth daily  6/5/17   Historical Provider, MD       Future Appointments   Date Time Provider Kurt Sotelo   6/6/2019 10:00 AM [facility] 2 [facility] WOUND None      and   Diabetes Assessment    Meal Planning:  Avoidance of

## 2019-06-04 NOTE — CARE COORDINATION
Ambulatory Care Coordination Note  5/29/19  CM Risk Score: 5  Lor Mortality Risk Score:      ACC: Mami Caicedo RN    Summary Note:  Late Entry: Called pt to follow up on progress, reinforce previous/ provide pt education, and discuss any new issues or concerns. LVMM requesting a call back to discuss. Care Coordination Interventions    Program Enrollment:  Rising Risk  Referral from Primary Care Provider:  Yes  Suggested Interventions and Community Resources  Diabetes Education:  Completed (Comment: Pt states completed previously)  Registered Dietician:  Completed (Comment: 5/20/19- Mailed Dial-a -dietician resource)  Senior Services:  Completed (Comment: 5/20/19- [facility] info mailed out)  Specialty Services Referral:  Completed (Comment: 5/20/19- to Neurology and Wound Care)  Other Services: In Process  Zone Management Tools:  Completed (Comment: 5/20/19: Mailed DM Zone Sheet)         Goals Addressed     None          Prior to Admission medications    Medication Sig Start Date End Date Taking? Authorizing Provider   traMADol (ULTRAM) 50 MG tablet Take 1 tablet by mouth every 6 hours as needed for Pain for up to 30 days. 5/17/19 6/16/19  Miya Chacon, APRN - CNP   cetirizine (ZYRTEC ALLERGY) 10 MG tablet Take 1 tablet by mouth daily 5/17/19 [facility], APRN - CNP   gabapentin (NEURONTIN) 100 MG capsule Take 1 capsule by mouth nightly for 180 days.  Intended supply: 90 days 5/17/19 11/13/19 [facility], APRN - CNP   tiZANidine (ZANAFLEX) 2 MG capsule Take 1 capsule by mouth 3 times daily as needed for Muscle spasms 4/18/19 [facility], APRN - CNP   montelukast (SINGULAIR) 10 MG tablet Take 1 tablet by mouth nightly 3/1/19   [facility], APRN - CNP   Ferrous Sulfate (IRON) 325 (65 Fe) MG TABS Take 325 mg by mouth daily 3/1/19   [facility], APRN - CNP   ibuprofen (ADVIL;MOTRIN) 200 MG tablet Take 200 mg by mouth every 6 hours as needed for Pain    Historical Provider, MD fluticasone (FLONASE) 50 MCG/ACT nasal spray 1 spray by Nasal route daily    Historical Provider, MD   furosemide (LASIX) 20 MG tablet Take 20 mg by mouth daily  6/5/17   Historical Provider, MD       Future Appointments   Date Time Provider Kurt Sotelo   6/6/2019 10:00 AM [facility] WOUND ROOM [facility] WOUND None

## 2019-06-05 ENCOUNTER — CARE COORDINATION (OUTPATIENT)
Dept: CARE COORDINATION | Age: 64
End: 2019-06-05

## 2019-06-05 NOTE — CARE COORDINATION
Ambulatory Care Coordination Note  6/5/2019  CM Risk Score: 5  Lor Mortality Risk Score:      ACC: Romy Coombs RN    Summary Note: Called pt to follow up on progress, reinforce previous/ provide pt education, and discuss any new issues or concerns. LVMM requesting a call back to discuss. Care Coordination Interventions    Program Enrollment:  Rising Risk  Referral from Primary Care Provider:  Yes  Suggested Interventions and Community Resources  Diabetes Education:  Completed (Comment: Pt states completed previously)  Registered Dietician:  Completed (Comment: 5/20/19- Mailed Dial-a -dietician resource)  Senior Services:  Completed (Comment: 5/20/19- [facility] info mailed out)  Specialty Services Referral:  Completed (Comment: 5/20/19- to Neurology and Wound Care)  Other Services: In Process  Zone Management Tools:  Completed (Comment: 5/20/19: Mailed DM Zone Sheet)         Goals Addressed     None          Prior to Admission medications    Medication Sig Start Date End Date Taking? Authorizing Provider   traMADol (ULTRAM) 50 MG tablet Take 1 tablet by mouth every 6 hours as needed for Pain for up to 30 days. 5/17/19 6/16/19  YOKO Cruz CNP   cetirizine (ZYRTEC ALLERGY) 10 MG tablet Take 1 tablet by mouth daily 5/17/19   YKOO Llanes CNP   gabapentin (NEURONTIN) 100 MG capsule Take 1 capsule by mouth nightly for 180 days.  Intended supply: 90 days 5/17/19 11/13/19  YOKO Llanes CNP   tiZANidine (ZANAFLEX) 2 MG capsule Take 1 capsule by mouth 3 times daily as needed for Muscle spasms 4/18/19   YOKO Llanes CNP   montelukast (SINGULAIR) 10 MG tablet Take 1 tablet by mouth nightly 3/1/19   YOKO Llanes CNP   Ferrous Sulfate (IRON) 325 (65 Fe) MG TABS Take 325 mg by mouth daily 3/1/19   YOKO Llanes CNP   ibuprofen (ADVIL;MOTRIN) 200 MG tablet Take 200 mg by mouth every 6 hours as needed for Pain    Historical Provider, MD   fluticasone

## 2019-06-06 ENCOUNTER — HOSPITAL ENCOUNTER (OUTPATIENT)
Dept: WOUND CARE | Age: 64
Discharge: HOME | End: 2019-06-06
Payer: COMMERCIAL

## 2019-06-06 ENCOUNTER — HOSPITAL ENCOUNTER (OUTPATIENT)
Age: 64
Discharge: HOME | End: 2019-06-08
Payer: COMMERCIAL

## 2019-06-06 ENCOUNTER — HOSPITAL ENCOUNTER (OUTPATIENT)
Dept: GENERAL RADIOLOGY | Age: 64
Discharge: HOME | End: 2019-06-08
Payer: COMMERCIAL

## 2019-06-06 VITALS
WEIGHT: 139 LBS | DIASTOLIC BLOOD PRESSURE: 60 MMHG | HEART RATE: 88 BPM | TEMPERATURE: 98.2 F | HEIGHT: 64 IN | SYSTOLIC BLOOD PRESSURE: 110 MMHG | RESPIRATION RATE: 16 BRPM | BODY MASS INDEX: 23.73 KG/M2

## 2019-06-06 DIAGNOSIS — L97.522 DIABETIC ULCER OF TOE OF LEFT FOOT ASSOCIATED WITH TYPE 2 DIABETES MELLITUS, WITH FAT LAYER EXPOSED (HCC): ICD-10-CM

## 2019-06-06 DIAGNOSIS — L97.522 NON-HEALING ULCER OF FOOT, LEFT, WITH FAT LAYER EXPOSED (HCC): ICD-10-CM

## 2019-06-06 DIAGNOSIS — L97.522 DIABETIC ULCER OF TOE OF LEFT FOOT ASSOCIATED WITH TYPE 2 DIABETES MELLITUS, WITH FAT LAYER EXPOSED (HCC): Primary | ICD-10-CM

## 2019-06-06 DIAGNOSIS — E11.621 DIABETIC ULCER OF TOE OF LEFT FOOT ASSOCIATED WITH TYPE 2 DIABETES MELLITUS, WITH FAT LAYER EXPOSED (HCC): Primary | ICD-10-CM

## 2019-06-06 DIAGNOSIS — E11.621 DIABETIC ULCER OF TOE OF LEFT FOOT ASSOCIATED WITH TYPE 2 DIABETES MELLITUS, WITH FAT LAYER EXPOSED (HCC): ICD-10-CM

## 2019-06-06 DIAGNOSIS — M45.9 RHEUMATOID ARTHRITIS INVOLVING VERTEBRA WITH POSITIVE RHEUMATOID FACTOR (HCC): ICD-10-CM

## 2019-06-06 PROCEDURE — 99213 OFFICE O/P EST LOW 20 MIN: CPT

## 2019-06-06 PROCEDURE — 73630 X-RAY EXAM OF FOOT: CPT

## 2019-06-06 PROCEDURE — 87070 CULTURE OTHR SPECIMN AEROBIC: CPT

## 2019-06-06 PROCEDURE — 87075 CULTR BACTERIA EXCEPT BLOOD: CPT

## 2019-06-06 PROCEDURE — 11042 DBRDMT SUBQ TIS 1ST 20SQCM/<: CPT

## 2019-06-06 PROCEDURE — 87186 SC STD MICRODIL/AGAR DIL: CPT

## 2019-06-06 RX ORDER — LIDOCAINE HYDROCHLORIDE 20 MG/ML
JELLY TOPICAL ONCE
Status: DISCONTINUED | OUTPATIENT
Start: 2019-06-06 | End: 2019-06-07 | Stop reason: HOSPADM

## 2019-06-06 ASSESSMENT — PAIN DESCRIPTION - DESCRIPTORS: DESCRIPTORS: STABBING

## 2019-06-06 ASSESSMENT — PAIN SCALES - GENERAL: PAINLEVEL_OUTOF10: 8

## 2019-06-06 ASSESSMENT — PAIN DESCRIPTION - FREQUENCY: FREQUENCY: INTERMITTENT

## 2019-06-06 ASSESSMENT — PAIN DESCRIPTION - PROGRESSION: CLINICAL_PROGRESSION: NOT CHANGED

## 2019-06-06 ASSESSMENT — PAIN DESCRIPTION - ORIENTATION: ORIENTATION: LEFT

## 2019-06-06 ASSESSMENT — PAIN DESCRIPTION - LOCATION: LOCATION: FOOT

## 2019-06-06 ASSESSMENT — PAIN DESCRIPTION - ONSET: ONSET: ON-GOING

## 2019-06-06 ASSESSMENT — PAIN DESCRIPTION - PAIN TYPE: TYPE: CHRONIC PAIN

## 2019-06-06 NOTE — PROGRESS NOTES
Wound Healing Center  History and Physical/Consultation  Podiatry    Referring Physician : YOKO Lyle CNP  Soha Watkins  MEDICAL RECORD NUMBER:  [id number]  AGE: 59 y.o. GENDER: female  : 1955  EPISODE DATE:  2019  Subjective:     Chief Complaint   Patient presents with    Wound Check     lt foot          HISTORY of PRESENT ILLNESS HPI     Soha Watkins is a 59 y.o. female who presents today for wound/ulcer evaluation. History of Wound Context:  The patient has had a wound of left heel which was first noted approximately a month ago. This has been treated locally with neosporin topically. On their initial visit to the wound healing center, 19, the patient has noted that the wound has not been improving. The patient has had wounds similar to this in the past.        Pt is currently not on abx. Wound/Ulcer Pain Timing/Severity: intermittent  Quality of pain: stabbing  Severity:  8 / 10   Modifying Factors: None  Associated Signs/Symptoms: none    Ulcer Identification:  Ulcer Type: pressure  Contributing Factors: RA    Diabetic/Pressure/Non Pressure Ulcers only:  Ulcer: Pressure ulcer, Stage 2    Wound: N/A        PAST MEDICAL HISTORY      Diagnosis Date    Arthritis     rheumatoid    Diabetes     borderline; A1C 6.3    Heart murmur     History of blood transfusion     Leg edema     Osteoarthritis      Past Surgical History:   Procedure Laterality Date    ANKLE FRACTURE SURGERY      L. ankle ORIF    CARPAL TUNNEL RELEASE      R. wrist    COLONOSCOPY      JOINT REPLACEMENT      bilateral knees  and     KNEE SURGERY  2010    R. total knee arthroplasty    IL ADJ TISS XFER HEAD,FAC,HAND <10SQCM Left 10/19/2018    AMPUTATION LEFT FOURTH TOE. performed by Saravanan Carr DPM at [address] [place] [address],[facility] TOE AMPUTATION Left     left fourth toe     TONSILLECTOMY  1966     History reviewed. No pertinent family history.   Social History     Tobacco Use    Smoking status: Never Smoker    Smokeless tobacco: Never Used   Substance Use Topics    Alcohol use: No    Drug use: No     Allergies   Allergen Reactions    Iodine Shortness Of Breath     Wheezing      Current Outpatient Medications on File Prior to Encounter   Medication Sig Dispense Refill    traMADol (ULTRAM) 50 MG tablet Take 1 tablet by mouth every 6 hours as needed for Pain for up to 30 days. 120 tablet 0    cetirizine (ZYRTEC ALLERGY) 10 MG tablet Take 1 tablet by mouth daily 30 tablet 3    gabapentin (NEURONTIN) 100 MG capsule Take 1 capsule by mouth nightly for 180 days. Intended supply: 90 days 30 capsule 1    montelukast (SINGULAIR) 10 MG tablet Take 1 tablet by mouth nightly 30 tablet 5    Ferrous Sulfate (IRON) 325 (65 Fe) MG TABS Take 325 mg by mouth daily 30 tablet 3    ibuprofen (ADVIL;MOTRIN) 200 MG tablet Take 200 mg by mouth every 6 hours as needed for Pain      fluticasone (FLONASE) 50 MCG/ACT nasal spray 1 spray by Nasal route daily      furosemide (LASIX) 20 MG tablet Take 20 mg by mouth daily       tiZANidine (ZANAFLEX) 2 MG capsule Take 1 capsule by mouth 3 times daily as needed for Muscle spasms 90 capsule 0     No current facility-administered medications on file prior to encounter.         REVIEW OF SYSTEMS   ROS : All others Negative if blank [], Positive if [x]  General Vascular   [] Fevers [] Claudication   [] Chills [] Rest Pain   Skin Neurologic   [x] Tissue Loss [x] Lower extremity neuropathy     Objective:    /60   Pulse 88   Temp 98.2 °F (36.8 °C) (Oral)   Resp 16   Ht 5' 4\" (1.626 m)   Wt 139 lb (63 kg)   BMI 23.86 kg/m²   Wt Readings from Last 3 Encounters:   06/06/19 139 lb (63 kg)   05/17/19 139 lb (63 kg)   04/18/19 145 lb 3.2 oz (65.9 kg)       PHYSICAL EXAM   CONSTITUTIONAL:   Awake, alert, cooperative   PSYCHIATRIC :  Oriented to time, place and person      normal insight to disease process  EXTREMITIES:   R LE Open wounds are not noted   Skin color is Discussed relationship of smoking and negative affects on wound healing   - Emphasized importance of tobacco avoidace/cessation   - no Script for nicotine patch given to patient   - no Information regarding support groups for smoking cessation given    In my professional opinion and based off the information that is available at this time this patient has appropriate indication for HBO Therapy: No    Treatment Note please see attached Discharge Instructions    Written patient dismissal instructions given to patient and signed by patient or POA. Discharge Instructions       Visit Discharge/Physician Orders    Discharge condition: Stable    Assessment of pain at discharge: minimal    Anesthetic used: 2% lidocaine gel    Discharge to: Home    Left via:Private automobile    Accompanied by: accompanied by spouse    ECF/HHA:     Dressing Orders: Cleanse left heel wound with normal saline solution apply medihoney and cover with saline moist gauze and dry dressing. Medi honey can be bought at [employer]. Treatment Orders: Need to wear a different shoe. Get xray. [address] [place] [address],[address] followup visit ______________one week_______________  (Please note your next appointment above and if you are unable to keep, kindly give a 24 hour notice. Thank you.)    Physician signature:__________________________      If you experience any of the following, please call the [address] [place] [address] Road during business hours:    * Increase in Pain  * Temperature over 101  * Increase in drainage from your wound  * Drainage with a foul odor  * Bleeding  * Increase in swelling  * Need for compression bandage changes due to slippage, breakthrough drainage. If you need medical attention outside of the business hours of the [facility] please contact your PCP or go to the nearest emergency room.         Electronically signed by Chad Acosta DPM on 6/6/2019 at 2:15 PM

## 2019-06-07 ENCOUNTER — CARE COORDINATION (OUTPATIENT)
Dept: CARE COORDINATION | Age: 64
End: 2019-06-07

## 2019-06-07 NOTE — CARE COORDINATION
notify my provider of any barriers to my plan of care. I will notify my provider of any symptoms that indicate a worsening of my condition. Barriers: lack of support, overwhelmed by complexity of regimen and stress  Plan for overcoming my barriers: Will work with [address], PCP, and family  Confidence: 7/10  Anticipated Goal Completion Date: 6/30/19              Prior to Admission medications    Medication Sig Start Date End Date Taking? Authorizing Provider   traMADol (ULTRAM) 50 MG tablet Take 1 tablet by mouth every 6 hours as needed for Pain for up to 30 days. 5/17/19 6/16/19  Miya Ballesteros APRN - CNP   cetirizine (ZYRTEC ALLERGY) 10 MG tablet Take 1 tablet by mouth daily 5/17/19   YOKO Rodriguez - CNP   gabapentin (NEURONTIN) 100 MG capsule Take 1 capsule by mouth nightly for 180 days.  Intended supply: 90 days 5/17/19 11/13/19  YOKO Rodriguez - CNP   tiZANidine (ZANAFLEX) 2 MG capsule Take 1 capsule by mouth 3 times daily as needed for Muscle spasms 4/18/19   Jolie Cruz APRN - CNP   montelukast (SINGULAIR) 10 MG tablet Take 1 tablet by mouth nightly 3/1/19   Jolie Cruz APRN - CNP   Ferrous Sulfate (IRON) 325 (65 Fe) MG TABS Take 325 mg by mouth daily 3/1/19   Jolie Cruz APRN - CNP   ibuprofen (ADVIL;MOTRIN) 200 MG tablet Take 200 mg by mouth every 6 hours as needed for Pain    Historical Provider, MD   fluticasone (FLONASE) 50 MCG/ACT nasal spray 1 spray by Nasal route daily    Historical Provider, MD   furosemide (LASIX) 20 MG tablet Take 20 mg by mouth daily  6/5/17   Historical Provider, MD       Future Appointments   Date Time Provider Kurt Sotelo   6/11/2019  3:15 PM Daniel Jane, [address] [place] [address]   6/18/2019  1:30 PM [facility] WOUND ROOM 1 [facility] WOUND None     ,   Diabetes Assessment    Meal Planning:  Avoidance of concentrated sweets   How often do you test your blood sugar?:  Daily   Do you have barriers with adherence to non-pharmacologic self-management

## 2019-06-08 LAB
ANAEROBIC CULTURE: NORMAL
ORGANISM: ABNORMAL
WOUND/ABSCESS: ABNORMAL
WOUND/ABSCESS: ABNORMAL

## 2019-06-10 ENCOUNTER — CARE COORDINATION (OUTPATIENT)
Dept: CARE COORDINATION | Age: 64
End: 2019-06-10

## 2019-06-10 NOTE — LETTER
6/12/2019           Nekoma Laurel   [address] [place]         Dear [facility]      Dear Pearl Low recently attempted to contact you to discuss your healthcare needs. My name is Marcelina Daly and I am a registered nurse who partners with YOKO Lawrence CNP  to improve patients health. As a member of your health care team, I would work with other providers involved in your care, offer education for your specific health conditions, and connect you with additional resources as needed. I will collaborate with YOKO Lawrence CNP to support you in following your treatment plan. The additional support I provide is no additional cost to you. My primary focus is to help you achieve specific goals and improve your health. I look forward to working with you. Please contact me at your earliest convenience at [phone].       In good health,    Marcelina Daly RN  Nurse Care Coordinator

## 2019-06-10 NOTE — CARE COORDINATION
Ambulatory Care Coordination Note  6/10/2019  CM Risk Score: 5  Lor Mortality Risk Score:      ACC: Shashi Bustos RN    Summary Note: Called pt to follow up on progress, reinforce previous/ provide pt education, and discuss any new issues or concerns. LVMM requesting a call back to discuss. Care Coordination Interventions    Program Enrollment:  Rising Risk  Referral from Primary Care Provider:  Yes  Suggested Interventions and Community Resources  Diabetes Education:  Completed (Comment: Pt states completed previously)  Registered Dietician:  Completed (Comment: 5/20/19- Mailed Dial-a -dietician resource)  Senior Services:  Completed (Comment: 5/20/19- AAA11 info mailed out)  Specialty Services Referral:  Completed (Comment: 5/20/19- to Neurology and Wound Care)  Zone Management Tools:  Completed (Comment: 5/20/19: Mailed DM Zone Sheet)         Goals Addressed     None          Prior to Admission medications    Medication Sig Start Date End Date Taking? Authorizing Provider   traMADol (ULTRAM) 50 MG tablet Take 1 tablet by mouth every 6 hours as needed for Pain for up to 30 days. 5/17/19 6/16/19  YOKO Maya CNP   cetirizine (ZYRTEC ALLERGY) 10 MG tablet Take 1 tablet by mouth daily 5/17/19   YOKO Mead CNP   gabapentin (NEURONTIN) 100 MG capsule Take 1 capsule by mouth nightly for 180 days.  Intended supply: 90 days 5/17/19 11/13/19  YOKO Mead CNP   tiZANidine (ZANAFLEX) 2 MG capsule Take 1 capsule by mouth 3 times daily as needed for Muscle spasms 4/18/19   YOKO Mead CNP   montelukast (SINGULAIR) 10 MG tablet Take 1 tablet by mouth nightly 3/1/19   YOKO Mead CNP   Ferrous Sulfate (IRON) 325 (65 Fe) MG TABS Take 325 mg by mouth daily 3/1/19   YOKO Mead CNP   ibuprofen (ADVIL;MOTRIN) 200 MG tablet Take 200 mg by mouth every 6 hours as needed for Pain    Historical Provider, MD   fluticasone (FLONASE) 50 MCG/ACT nasal spray 1 spray by Nasal route daily    Historical Provider, MD   furosemide (LASIX) 20 MG tablet Take 20 mg by mouth daily  6/5/17   Historical Provider, MD       Future Appointments   Date Time Provider Kurt Sotelo   6/11/2019  3:15 PM POLINA Pizarro [facility]   6/18/2019  1:30 PM [facility] WOUND ROOM 1 [facility] WOUND None

## 2019-06-11 ENCOUNTER — INITIAL CONSULT (OUTPATIENT)
Dept: NEUROSURGERY | Age: 64
End: 2019-06-11
Payer: COMMERCIAL

## 2019-06-11 DIAGNOSIS — M54.2 NECK PAIN OF OVER 3 MONTHS DURATION: Primary | ICD-10-CM

## 2019-06-11 PROCEDURE — 99202 OFFICE O/P NEW SF 15 MIN: CPT | Performed by: PHYSICIAN ASSISTANT

## 2019-06-12 ASSESSMENT — ENCOUNTER SYMPTOMS
ALLERGIC/IMMUNOLOGIC NEGATIVE: 1
GASTROINTESTINAL NEGATIVE: 1
RESPIRATORY NEGATIVE: 1
EYES NEGATIVE: 1

## 2019-06-12 NOTE — CARE COORDINATION
Ambulatory Care Coordination Note  6/12/2019  CM Risk Score: 5  Lor Mortality Risk Score:      ACC: Rodney Carlton, RN    Summary Note: Called pt to follow up on progress, discuss new issues or concerns, and reinforce/ provide pt education. Pt states she went to pain management for neck pain. Neck pain- They ordered PT and x-rays. She states they are not sure if the pain in her neck is also causing the pain in her arms and legs so they are going to determine that after the x-ray and PT eval.  She has not gotten the PT appt set up yet. She is calling her insurance to see where she can go today. She also does not have the appt set up fo the x-ray. She is calling today to get that set up too. Discussed that she also needs to have an appt on the schedule d for ehr follow up with PCP. Looking at the notes from her last visit PCP wanted to see her for a 3 month follow up in August.  Pt agreeable to schedule that appt. She states she will not have any difficulty getting to the PT or follow up appts as her  usually drives her to all her appts. DM- Pt is compliant with SBGM, diet, and medications. Pt denies any new or worsening S/S to report. Her BG is in \"good\" range at usually between 100- 130. Wound- pt continues with wound therapy and dressing changes. She denies any worsening or new symptoms to report. She still has some pain, but it is not too bothersome and controlled       -Reinforced/ Provided Education on:  Importance and benefits of medication, diet, and self monitoring compliance. Reinforced importance an benefits of following through with recommended testing and treatments. Reinforced previous education on importance of nutrition for healing and control of CC. Plan: Scheduled PCP follow up appt. Will mail out pt education on topics discussed today along with Glucerna coupons.  Will follow up in 1 week to discuss POC and provide any support with getting recommended testing and treatments. Pt verbalized understanding and is agreeable to follow up contact. Care Coordination Interventions    Program Enrollment:  Rising Risk  Referral from Primary Care Provider:  Yes  Suggested Interventions and Community Resources  Diabetes Education:  Completed (Comment: Pt states completed previously)  Physical Therapy:  Not Started  Registered Dietician:  Completed (Comment: 5/20/19- Mailed Dial-a -dietician resource)  Senior Services:  Completed (Comment: 5/20/19- AAA11 info mailed out)  Specialty Services Referral:  Completed (Comment: 5/20/19- to Neurology and Wound Care)  Zone Management Tools:  Completed (Comment: 5/20/19: Mailed DM Zone Sheet)         Goals Addressed                 This Visit's Progress     Community Resource Goal   On track     I will complete referral to [facility] for assistance. Barriers: lack of support and overwhelmed by complexity of regimen  Plan for overcoming my barriers: Will work with [facility]   Confidence: 8/10  Anticipated Goal Completion Date: 6/15/19       Conditions and Symptoms   On track     I will schedule office visits, as directed by my provider. I will notify my provider of any barriers to my plan of care. I will notify my provider of any symptoms that indicate a worsening of my condition. Barriers: lack of support, overwhelmed by complexity of regimen and stress  Plan for overcoming my barriers: Will work with [facility], PCP, and family  Confidence: 7/10  Anticipated Goal Completion Date: 6/30/19              Prior to Admission medications    Medication Sig Start Date End Date Taking? Authorizing Provider   traMADol (ULTRAM) 50 MG tablet Take 1 tablet by mouth every 6 hours as needed for Pain for up to 30 days.  5/17/19 6/16/19  Miya De La Cruz APRN - CNP   cetirizine (ZYRTEC ALLERGY) 10 MG tablet Take 1 tablet by mouth daily 5/17/19   YOKO Llanes - CNP   gabapentin (NEURONTIN) 100 MG capsule Take 1 capsule by mouth nightly for 180 days. Intended supply: 90 days 5/17/19 11/13/19  YOKO Vasquez CNP   tiZANidine (ZANAFLEX) 2 MG capsule Take 1 capsule by mouth 3 times daily as needed for Muscle spasms 4/18/19   YOKO Vasquez CNP   montelukast (SINGULAIR) 10 MG tablet Take 1 tablet by mouth nightly 3/1/19   YOKO Vasquez CNP   Ferrous Sulfate (IRON) 325 (65 Fe) MG TABS Take 325 mg by mouth daily 3/1/19   YOKO Vasquez CNP   ibuprofen (ADVIL;MOTRIN) 200 MG tablet Take 200 mg by mouth every 6 hours as needed for Pain    Historical Provider, MD   fluticasone (FLONASE) 50 MCG/ACT nasal spray 1 spray by Nasal route daily    Historical Provider, MD   furosemide (LASIX) 20 MG tablet Take 20 mg by mouth daily  6/5/17   Historical Provider, MD       Future Appointments   Date Time Provider Kurt Sotelo   6/18/2019  1:30 PM [facility] WOUND ROOM 1 [facility] WOUND None     ,   Diabetes Assessment    Medic Alert ID:  No  Meal Planning:  Avoidance of concentrated sweets   How often do you test your blood sugar?:  Daily   Do you have barriers with adherence to non-pharmacologic self-management interventions?  (Nutrition/Exercise/Self-Monitoring):  No   Have you ever had to go to the ED for symptoms of low blood sugar?:  No       No patient-reported symptoms   Do you have hyperglycemia symptoms?:  No   Do you have hypoglycemia symptoms?:  No   Last Blood Sugar Value:  112   Blood Sugar Monitoring Regimen:  Morning Fasting   Blood Sugar Trends:  No Change       and   General Assessment    Do you have any symptoms that are causing concern?:  Yes  Progression since Onset:  Unchanged  Reported Symptoms:  Pain (Comment: neck pain)

## 2019-06-12 NOTE — PROGRESS NOTES
Subjective:      Patient ID: David Quinones is a 59 y.o. female. Neck Pain    This is a chronic problem. The current episode started more than 1 year ago. The problem occurs constantly. The problem has been gradually worsening. The pain is associated with nothing. The pain is present in the midline. The quality of the pain is described as aching. The pain is severe. Associated symptoms comments: Progressive neck deformity. Review of Systems   Constitutional: Negative. HENT: Negative. Eyes: Negative. Respiratory: Negative. Cardiovascular: Negative. Gastrointestinal: Negative. Endocrine: Negative. Genitourinary: Negative. Musculoskeletal: Positive for neck pain. Skin: Negative. Allergic/Immunologic: Negative. Neurological: Negative. Hematological: Negative. Psychiatric/Behavioral: Negative. Objective:   Physical Exam   Constitutional: She appears well-developed and well-nourished. HENT:   Head: Normocephalic and atraumatic. Eyes: Pupils are equal, round, and reactive to light. EOM are normal.   Neck:   +deformity, pain to palpation of posterior cervical musculature   Pulmonary/Chest: No respiratory distress. Abdominal: She exhibits no distension. Neurological: She has normal reflexes. She is not disoriented. No cranial nerve deficit. GCS eye subscore is 4. GCS verbal subscore is 5. GCS motor subscore is 6. She displays no Babinski's sign on the right side. She displays no Babinski's sign on the left side.   + hand and UE contractures. Decreased sensation to light touch in glove distribution   Skin: Skin is warm and dry. Psychiatric: She has a normal mood and affect. Assessment:      59year old female with progressive neck pain and severe cervical deformity. Plan:      Cervical x-rays and PT will be ordered. If conservative measures fail, we will get a cervical MRI.   She may continue with NSAIDS and gabapentin if beneficial.        Render Yoseph Early [place]

## 2019-06-13 LAB
AVERAGE GLUCOSE: ABNORMAL
HBA1C MFR BLD: 6.3 %

## 2019-06-18 ENCOUNTER — HOSPITAL ENCOUNTER (OUTPATIENT)
Dept: WOUND CARE | Age: 64
Discharge: HOME | End: 2019-06-18
Payer: COMMERCIAL

## 2019-06-19 ENCOUNTER — CARE COORDINATION (OUTPATIENT)
Dept: CARE COORDINATION | Age: 64
End: 2019-06-19

## 2019-06-19 NOTE — CARE COORDINATION
Ambulatory Care Coordination Note  6/19/2019  CM Risk Score: 5  Lor Mortality Risk Score:      ACC: Karan Schneider RN    Summary Note: Called pt to follow up on progress, reinforce previous/ provide pt education, and discuss any new issues or concerns. LVMM requesting a call back to discuss. Care Coordination Interventions    Program Enrollment:  Rising Risk  Referral from Primary Care Provider:  Yes  Suggested Interventions and Community Resources  Diabetes Education:  Completed (Comment: Pt states completed previously)  Physical Therapy:  Not Started (Comment: 6/11/19- pt ordered PT for neck pain.)  Registered Dietician:  Completed (Comment: 5/20/19- Mailed Dial-a -dietician resource)  Senior Services:  Completed (Comment: 5/20/19- AAA11 info mailed out)  Specialty Services Referral:  Completed (Comment: 5/20/19- to Neurology and Wound Care)  Zone Management Tools:  Completed (Comment: 5/20/19: Mailed DM Zone Sheet)         Goals Addressed     None          Prior to Admission medications    Medication Sig Start Date End Date Taking? Authorizing Provider   cetirizine (ZYRTEC ALLERGY) 10 MG tablet Take 1 tablet by mouth daily 5/17/19   Claudean Crate, APRN - CNP   gabapentin (NEURONTIN) 100 MG capsule Take 1 capsule by mouth nightly for 180 days.  Intended supply: 90 days 5/17/19 11/13/19  Claudean Crate, APRN - CNP   tiZANidine (ZANAFLEX) 2 MG capsule Take 1 capsule by mouth 3 times daily as needed for Muscle spasms 4/18/19   Claudean Crate, APRN - CNP   montelukast (SINGULAIR) 10 MG tablet Take 1 tablet by mouth nightly 3/1/19   Claudean Crate, APRN - CNP   Ferrous Sulfate (IRON) 325 (65 Fe) MG TABS Take 325 mg by mouth daily 3/1/19   Claudean Crate, APRN - CNP   ibuprofen (ADVIL;MOTRIN) 200 MG tablet Take 200 mg by mouth every 6 hours as needed for Pain    Historical Provider, MD   fluticasone (FLONASE) 50 MCG/ACT nasal spray 1 spray by Nasal route daily    Historical Provider, MD   furosemide (LASIX) 20 MG tablet Take 20 mg by mouth daily  6/5/17   Historical Provider, MD       Future Appointments   Date Time Provider Kurt Sotelo   6/25/2019  3:00 PM [facility] WOUND ROOM 2 [facility] WOUND None   8/23/2019  1:15 PM YOKO Basilio - CNP [facility]

## 2019-06-25 ENCOUNTER — HOSPITAL ENCOUNTER (OUTPATIENT)
Dept: WOUND CARE | Age: 64
Discharge: HOME | End: 2019-06-25
Payer: COMMERCIAL

## 2019-06-25 VITALS
TEMPERATURE: 98.5 F | RESPIRATION RATE: 14 BRPM | SYSTOLIC BLOOD PRESSURE: 90 MMHG | DIASTOLIC BLOOD PRESSURE: 60 MMHG | HEART RATE: 98 BPM

## 2019-06-25 DIAGNOSIS — L97.522 NON-HEALING ULCER OF FOOT, LEFT, WITH FAT LAYER EXPOSED (HCC): ICD-10-CM

## 2019-06-25 PROCEDURE — 99212 OFFICE O/P EST SF 10 MIN: CPT

## 2019-06-25 PROCEDURE — 99212 OFFICE O/P EST SF 10 MIN: CPT | Performed by: FAMILY MEDICINE

## 2019-06-25 RX ORDER — LIDOCAINE HYDROCHLORIDE 20 MG/ML
JELLY TOPICAL ONCE
Status: DISCONTINUED | OUTPATIENT
Start: 2019-06-25 | End: 2019-06-26 | Stop reason: HOSPADM

## 2019-06-25 ASSESSMENT — PAIN SCALES - GENERAL: PAINLEVEL_OUTOF10: 3

## 2019-06-25 NOTE — PROGRESS NOTES
Follow-Up Wound Progress Note  Marco Levine  AGE: 59 y.o. GENDER: female  DOD: 1955  TODAY'S DATE:  6/25/19  Subjective:    Marco Levine is a 59 y.o. female who presents today for wound check. Wound Etiology :  pressure ulcer: Stage III  Wound Location :  on left achilles Pain : {2/10     Abx : Absent       Overall Wound Assessment  Wound is has slightly improved. Size has decreased    Objective:    BP 90/60   Pulse 98   Temp 98.5 °F (36.9 °C) (Oral)   Resp 14   Wound 07/24/14 Venous ulcer Leg Right;Upper new wound, #1 venous. right upper leg, date of onset may 2014 (Active)   Number of days: 1797       Wound 07/24/14 Venous ulcer Ankle Medial new wound #2 right medial ankle. venous date of onset may 2014 (Active)   Number of days: 1797       Wound 08/07/14 Diabetic Ulcer Heel Posterior new wound #3 diabetic ulcer right heel. briones 2. date of onset aug 2014 (Active)   Number of days: 1783       Incision 10/19/18 Foot Left (Active)   Number of days: 249       Wound 06/06/19 Ankle Posterior; Left new #1 left post ankle. pressure.  onset may 2019 (Active)   Wound Image   6/6/2019 11:20 AM   Wound Pressure Stage  2 6/6/2019 11:46 AM   Dressing/Treatment Wound gel;Dry Dressing 6/6/2019 11:56 AM   Wound Cleansed Rinsed/Irrigated with saline 6/6/2019 11:56 AM   Wound Length (cm) 0.7 cm 6/25/2019  3:15 PM   Wound Width (cm) 0.4 cm 6/25/2019  3:15 PM   Wound Depth (cm) 0.1 cm 6/25/2019  3:15 PM   Wound Surface Area (cm^2) 0.28 cm^2 6/25/2019  3:15 PM   Change in Wound Size % (l*w) 33.33 6/25/2019  3:15 PM   Wound Volume (cm^3) 0.03 cm^3 6/25/2019  3:15 PM   Wound Healing % 62 6/25/2019  3:15 PM   Post-Procedure Length (cm) 0.7 cm 6/6/2019 11:46 AM   Post-Procedure Width (cm) 0.6 cm 6/6/2019 11:46 AM   Post-Procedure Depth (cm) 0.2 cm 6/6/2019 11:46 AM   Post-Procedure Surface Area (cm^2) 0.42 cm^2 6/6/2019 11:46 AM   Post-Procedure Volume (cm^3) 0.08 cm^3 6/6/2019 11:46 AM   Wound Assessment Red 6/25/2019  3:15 (cm^3) 0.03 cm^3 6/25/2019  3:15 PM   Wound Healing % 62 6/25/2019  3:15 PM   Post-Procedure Length (cm) 0.7 cm 6/6/2019 11:46 AM   Post-Procedure Width (cm) 0.6 cm 6/6/2019 11:46 AM   Post-Procedure Depth (cm) 0.2 cm 6/6/2019 11:46 AM   Post-Procedure Surface Area (cm^2) 0.42 cm^2 6/6/2019 11:46 AM   Post-Procedure Volume (cm^3) 0.08 cm^3 6/6/2019 11:46 AM   Wound Assessment Red 6/25/2019  3:15 PM   Drainage Amount None 6/25/2019  3:15 PM   Drainage Description Yellow 6/6/2019 11:24 AM   Odor None 6/6/2019 11:24 AM   Kenna-wound Assessment Pink 6/25/2019  3:15 PM   Red%Wound Bed 50 6/6/2019 11:24 AM   Yellow%Wound Bed 50 6/6/2019 11:24 AM   Number of days: 19       Assessment:     Please refer to nursing measurements and assessment regarding wound size pre and post debridement. Wound check - Care provided includes removal of existing dressing and visual inspection    Procedure: Debridement Note: None  Diagnosis: pressure ulcer: Stage III                      Patient Active Problem List   Diagnosis Code    Knee pain M25.569    Left knee DJD M17.12    Status post total knee replacement Z96.659    Rheumatoid arthritis of hand ([facility] [address] M06.9    Chronic venous hypertension w/ulcer and inflammation involv right side ([facility]) I87.331, L97.919    C6 radiculopathy M54.12    Rheumatoid arthritis involving vertebra with positive rheumatoid factor ([facility]) M45.9    Decreased  strength R29.898    Non-healing ulcer of foot, left, with fat layer exposed ([address] W25.287           Plan:      Treatment & Plan: 1.Non operative wound management                              2. Collagen                              3. Discussed appropriate home care of this wound. 4. Patient instructions were given. 5. Follow Up in 2 week(s).                                      Burke Xiong DO

## 2019-07-03 ENCOUNTER — CARE COORDINATION (OUTPATIENT)
Dept: CARE COORDINATION | Age: 64
End: 2019-07-03

## 2019-07-16 ENCOUNTER — HOSPITAL ENCOUNTER (OUTPATIENT)
Dept: WOUND CARE | Age: 64
Discharge: HOME | End: 2019-07-16
Payer: COMMERCIAL

## 2019-07-17 ENCOUNTER — CARE COORDINATION (OUTPATIENT)
Dept: CARE COORDINATION | Age: 64
End: 2019-07-17

## 2019-07-24 ENCOUNTER — CARE COORDINATION (OUTPATIENT)
Dept: CARE COORDINATION | Age: 64
End: 2019-07-24

## 2019-07-24 NOTE — CARE COORDINATION
Provider, MD   furosemide (LASIX) 20 MG tablet Take 20 mg by mouth daily  6/5/17   Historical Provider, MD       Future Appointments   Date Time Provider Kurt Sotelo   7/30/2019  3:00 PM [facility] WOUND ROOM 3 [facility] WOUND None   8/23/2019  1:15 PM Miya Rodriguez, YOKO - CNP [facility]

## 2019-07-25 ENCOUNTER — CARE COORDINATION (OUTPATIENT)
Dept: CARE COORDINATION | Age: 64
End: 2019-07-25

## 2019-07-25 NOTE — CARE COORDINATION
Beto to discuss HHA benefits and care management services. Spoke with [employer]. Pt does have HHA services as a covered benefit with 10% co-insurance after deductible met. Pt has a $950.00 deductible and has met $852.00. She has a $2000.00 OOP max and has only reached $1025.00. She currently does not have a care manager, but he is routing the request to the Care Managment team.  Pt's phone and Jane Alva and phone given as contacts for Care Managment team follow up.

## 2019-07-26 ENCOUNTER — OFFICE VISIT (OUTPATIENT)
Dept: FAMILY MEDICINE CLINIC | Age: 64
End: 2019-07-26
Payer: COMMERCIAL

## 2019-07-26 ENCOUNTER — APPOINTMENT (OUTPATIENT)
Dept: CT IMAGING | Age: 64
End: 2019-07-26
Payer: COMMERCIAL

## 2019-07-26 ENCOUNTER — HOSPITAL ENCOUNTER (EMERGENCY)
Age: 64
Discharge: HOME | End: 2019-07-26
Attending: EMERGENCY MEDICINE
Payer: COMMERCIAL

## 2019-07-26 VITALS
HEIGHT: 65 IN | OXYGEN SATURATION: 98 % | BODY MASS INDEX: 23.16 KG/M2 | SYSTOLIC BLOOD PRESSURE: 126 MMHG | RESPIRATION RATE: 18 BRPM | HEART RATE: 75 BPM | WEIGHT: 139 LBS | DIASTOLIC BLOOD PRESSURE: 60 MMHG | TEMPERATURE: 98.8 F

## 2019-07-26 VITALS
WEIGHT: 132 LBS | HEIGHT: 64 IN | DIASTOLIC BLOOD PRESSURE: 64 MMHG | SYSTOLIC BLOOD PRESSURE: 128 MMHG | RESPIRATION RATE: 16 BRPM | OXYGEN SATURATION: 94 % | HEART RATE: 98 BPM | TEMPERATURE: 98 F | BODY MASS INDEX: 22.53 KG/M2

## 2019-07-26 DIAGNOSIS — H05.222 ORBITAL EDEMA, LEFT: Primary | ICD-10-CM

## 2019-07-26 DIAGNOSIS — Z74.09 MOBILITY IMPAIRED: ICD-10-CM

## 2019-07-26 DIAGNOSIS — H10.9 CONJUNCTIVITIS OF LEFT EYE, UNSPECIFIED CONJUNCTIVITIS TYPE: ICD-10-CM

## 2019-07-26 DIAGNOSIS — R60.0 BILATERAL LEG EDEMA: ICD-10-CM

## 2019-07-26 DIAGNOSIS — M45.9 RHEUMATOID ARTHRITIS INVOLVING VERTEBRA WITH POSITIVE RHEUMATOID FACTOR (HCC): ICD-10-CM

## 2019-07-26 DIAGNOSIS — L03.213 PERIORBITAL CELLULITIS OF LEFT EYE: Primary | ICD-10-CM

## 2019-07-26 LAB
ANION GAP SERPL CALCULATED.3IONS-SCNC: 11 MMOL/L (ref 7–16)
BASOPHILS ABSOLUTE: 0.04 E9/L (ref 0–0.2)
BASOPHILS RELATIVE PERCENT: 0.6 % (ref 0–2)
BUN BLDV-MCNC: 16 MG/DL (ref 8–23)
CALCIUM SERPL-MCNC: 9.4 MG/DL (ref 8.6–10.2)
CHLORIDE BLD-SCNC: 101 MMOL/L (ref 98–107)
CO2: 28 MMOL/L (ref 22–29)
CREAT SERPL-MCNC: 0.7 MG/DL (ref 0.5–1)
EOSINOPHILS ABSOLUTE: 0.17 E9/L (ref 0.05–0.5)
EOSINOPHILS RELATIVE PERCENT: 2.7 % (ref 0–6)
GFR AFRICAN AMERICAN: >60
GFR NON-AFRICAN AMERICAN: >60 ML/MIN/1.73
GLUCOSE BLD-MCNC: 110 MG/DL (ref 74–99)
HCT VFR BLD CALC: 30.5 % (ref 34–48)
HEMOGLOBIN: 9.9 G/DL (ref 11.5–15.5)
IMMATURE GRANULOCYTES #: 0.01 E9/L
IMMATURE GRANULOCYTES %: 0.2 % (ref 0–5)
LACTIC ACID: 1 MMOL/L (ref 0.5–2.2)
LYMPHOCYTES ABSOLUTE: 1.21 E9/L (ref 1.5–4)
LYMPHOCYTES RELATIVE PERCENT: 19.4 % (ref 20–42)
MCH RBC QN AUTO: 29.8 PG (ref 26–35)
MCHC RBC AUTO-ENTMCNC: 32.5 % (ref 32–34.5)
MCV RBC AUTO: 91.9 FL (ref 80–99.9)
MONOCYTES ABSOLUTE: 0.61 E9/L (ref 0.1–0.95)
MONOCYTES RELATIVE PERCENT: 9.8 % (ref 2–12)
NEUTROPHILS ABSOLUTE: 4.19 E9/L (ref 1.8–7.3)
NEUTROPHILS RELATIVE PERCENT: 67.3 % (ref 43–80)
PDW BLD-RTO: 13.4 FL (ref 11.5–15)
PLATELET # BLD: 188 E9/L (ref 130–450)
PMV BLD AUTO: 10.3 FL (ref 7–12)
POTASSIUM SERPL-SCNC: 5.1 MMOL/L (ref 3.5–5)
RBC # BLD: 3.32 E12/L (ref 3.5–5.5)
SODIUM BLD-SCNC: 140 MMOL/L (ref 132–146)
WBC # BLD: 6.2 E9/L (ref 4.5–11.5)

## 2019-07-26 PROCEDURE — 99213 OFFICE O/P EST LOW 20 MIN: CPT | Performed by: NURSE PRACTITIONER

## 2019-07-26 PROCEDURE — 83605 ASSAY OF LACTIC ACID: CPT

## 2019-07-26 PROCEDURE — 36415 COLL VENOUS BLD VENIPUNCTURE: CPT

## 2019-07-26 PROCEDURE — 80048 BASIC METABOLIC PNL TOTAL CA: CPT

## 2019-07-26 PROCEDURE — 99283 EMERGENCY DEPT VISIT LOW MDM: CPT

## 2019-07-26 PROCEDURE — 85025 COMPLETE CBC W/AUTO DIFF WBC: CPT

## 2019-07-26 RX ORDER — TRAMADOL HYDROCHLORIDE 50 MG/1
50 TABLET ORAL EVERY 6 HOURS PRN
COMMUNITY
End: 2019-07-26

## 2019-07-26 RX ORDER — FUROSEMIDE 20 MG/1
20 TABLET ORAL DAILY
Qty: 30 TABLET | Refills: 2 | Status: SHIPPED | OUTPATIENT
Start: 2019-07-26

## 2019-07-26 RX ORDER — TRAMADOL HYDROCHLORIDE 50 MG/1
50 TABLET ORAL EVERY 6 HOURS PRN
Qty: 120 TABLET | Refills: 0 | Status: SHIPPED | OUTPATIENT
Start: 2019-07-26 | End: 2019-08-25

## 2019-07-26 RX ORDER — METHYLPREDNISOLONE 4 MG/1
TABLET ORAL
Qty: 21 TABLET | Status: SHIPPED | OUTPATIENT
Start: 2019-07-26 | End: 2019-08-01

## 2019-07-26 RX ORDER — CEPHALEXIN 500 MG/1
500 CAPSULE ORAL 4 TIMES DAILY
Qty: 40 CAPSULE | Refills: 0 | Status: SHIPPED | OUTPATIENT
Start: 2019-07-26 | End: 2019-08-13 | Stop reason: ALTCHOICE

## 2019-07-26 ASSESSMENT — ENCOUNTER SYMPTOMS
WHEEZING: 0
SHORTNESS OF BREATH: 0
COUGH: 0

## 2019-07-26 NOTE — PROGRESS NOTES
Negative for difficulty urinating. Musculoskeletal: Positive for myalgias and neck pain. Difficulty with  strength in hands   Neurological: Positive for dizziness and headaches. VS:  /64   Pulse 98   Temp 98 °F (36.7 °C) (Oral)   Resp 16   Ht 5' 4\" (1.626 m)   Wt 132 lb (59.9 kg)   SpO2 94%   BMI 22.66 kg/m²     Physical Exam  Physical Exam   Constitutional: She is oriented to person, place, and time. She appears well-developed and well-nourished. HENT:   Head: Normocephalic and atraumatic. Neck: No tracheal deviation present. No thyromegaly present. Cardiovascular: Normal rate and regular rhythm. No murmur heard. Pulmonary/Chest: Effort normal and breath sounds normal.   Abdominal: Soft. Bowel sounds are normal. There is no tenderness. Musculoskeletal: Normal range of motion. She exhibits tenderness. Severe cervical kyphosis with almost complete loss of ROM. Bilateral hands with rheumatoid changes,  strength 2/5, deformity of MIP and PIP joints. Right foot externally rotated 70 degrees. Orthotic shoe on right foot, open toe boot to left foot, uses cane. Lymphadenopathy:     She has no cervical adenopathy. Neurological: She is alert and oriented to person, place, and time. Skin: Skin is warm and dry. Psychiatric: She has a normal mood and affect. Her behavior is normal.     Assessment/Plan:  Jeanenne Skiff was seen today for arthritis, blepharitis and health maintenance. Diagnoses and all orders for this visit:    Periorbital cellulitis of left eye  -  Proceed to ED for further evaluation    Rheumatoid arthritis involving vertebra with positive rheumatoid factor (HCC)  -     traMADol (ULTRAM) 50 MG tablet; Take 1 tablet by mouth every 6 hours as needed for Pain for up to 30 days. - The current medical regimen is effective;  continue present plan and medications.   -     Wheelchair  -     Shower chair    Mobility impaired  -     Wheelchair  -     Shower chair  - Family will call back with [address] they would like Home health order sent to     Other orders  -     furosemide (LASIX) 20 MG tablet; Take 1 tablet by mouth daily  - The current medical regimen is effective;  continue present plan and medications.     Greater than 15 minutes was spent with patient and more than 50% of the time was spent face to face counseling and educating regarding diagnoses

## 2019-07-26 NOTE — ED PROVIDER NOTES
hospital encounter of 07/26/19   CBC auto differential   Result Value Ref Range    WBC 6.2 4.5 - 11.5 E9/L    RBC 3.32 (L) 3.50 - 5.50 E12/L    Hemoglobin 9.9 (L) 11.5 - 15.5 g/dL    Hematocrit 30.5 (L) 34.0 - 48.0 %    MCV 91.9 80.0 - 99.9 fL    MCH 29.8 26.0 - 35.0 pg    MCHC 32.5 32.0 - 34.5 %    RDW 13.4 11.5 - 15.0 fL    Platelets 343 570 - 795 E9/L    MPV 10.3 7.0 - 12.0 fL    Neutrophils % 67.3 43.0 - 80.0 %    Immature Granulocytes % 0.2 0.0 - 5.0 %    Lymphocytes % 19.4 (L) 20.0 - 42.0 %    Monocytes % 9.8 2.0 - 12.0 %    Eosinophils % 2.7 0.0 - 6.0 %    Basophils % 0.6 0.0 - 2.0 %    Neutrophils # 4.19 1.80 - 7.30 E9/L    Immature Granulocytes # 0.01 E9/L    Lymphocytes # 1.21 (L) 1.50 - 4.00 E9/L    Monocytes # 0.61 0.10 - 0.95 E9/L    Eosinophils # 0.17 0.05 - 0.50 E9/L    Basophils # 0.04 0.00 - 0.20 N9/H   Basic Metabolic Panel   Result Value Ref Range    Sodium 140 132 - 146 mmol/L    Potassium 5.1 (H) 3.5 - 5.0 mmol/L    Chloride 101 98 - 107 mmol/L    CO2 28 22 - 29 mmol/L    Anion Gap 11 7 - 16 mmol/L    Glucose 110 (H) 74 - 99 mg/dL    BUN 16 8 - 23 mg/dL    CREATININE 0.7 0.5 - 1.0 mg/dL    GFR Non-African American >60 >=60 mL/min/1.73    GFR African American >60     Calcium 9.4 8.6 - 10.2 mg/dL   Lactic Acid, Plasma   Result Value Ref Range    Lactic Acid 1.0 0.5 - 2.2 mmol/L     Imaging: All Radiology results interpreted by Radiologist unless otherwise noted. CT Facial Bones WO Contrast    (Results Pending)     ED Course / Medical Decision Making   Medications - No data to display     Re-examination:  7/26/19       Time: 445   same    Consult(s):   None    Procedure(s):   none    MDM:   Patient said she has had edema in the eyelids and orbit of the left eye intermittently for a month but she thinks it is related to not being able to straighten her neck and her head rests on her shoulder. Patient also has had some mild drainage but does not have erythema or pain.  Patient has erythema and edema to her lower legs but no warmth or pain noted- edema and erythema are BL and stable and consistent with dependent edema. Family wanted a CT of the face to r/o cellulitis but PE did not reveal any s/s of pain or infection. The radiologist also evaluated the patient and did not feel a CT would show anything because of the patient's physical position. Labs were unremarkable as were vitals. Patient will be treated symptomatically. This was discussed with the patient and she was in agreement with the plan. Patient was encouraged to return to the ER if she failed to improve or worsened over the weekend. Counseling: The emergency provider has spoken with the patient and family member patient and sister and discussed todays results, in addition to providing specific details for the plan of care and counseling regarding the diagnosis and prognosis. Questions are answered at this time and they are agreeable with the plan. Assessment      1. Orbital edema, left    2. Bilateral leg edema    3. Conjunctivitis of left eye, unspecified conjunctivitis type      Plan   Discharge to home  Patient condition is stable    New Medications     New Prescriptions    CEPHALEXIN (KEFLEX) 500 MG CAPSULE    Take 1 capsule by mouth 4 times daily    METHYLPREDNISOLONE (MEDROL, KOBI,) 4 MG TABLET    Take as directed on package insert days 1-6    TOBRAMYCIN-DEXAMETHASONE (TOBRADEX) 0.3-0.1 % OPHTHALMIC OINTMENT    3 times daily. Electronically signed by YOKO Lester CNP   DD: 7/26/19  **This report was transcribed using voice recognition software. Every effort was made to ensure accuracy; however, inadvertent computerized transcription errors may be present.   END OF ED PROVIDER NOTE      YOKO Lester CNP  07/26/19 0105

## 2019-07-30 ENCOUNTER — CARE COORDINATION (OUTPATIENT)
Dept: CARE COORDINATION | Age: 64
End: 2019-07-30

## 2019-07-30 ENCOUNTER — HOSPITAL ENCOUNTER (OUTPATIENT)
Dept: WOUND CARE | Age: 64
Discharge: HOME | End: 2019-07-30
Payer: COMMERCIAL

## 2019-07-30 VITALS
SYSTOLIC BLOOD PRESSURE: 110 MMHG | WEIGHT: 139 LBS | BODY MASS INDEX: 23.16 KG/M2 | HEIGHT: 65 IN | RESPIRATION RATE: 18 BRPM | HEART RATE: 72 BPM | DIASTOLIC BLOOD PRESSURE: 64 MMHG | TEMPERATURE: 98.5 F

## 2019-07-30 PROCEDURE — 87075 CULTR BACTERIA EXCEPT BLOOD: CPT

## 2019-07-30 PROCEDURE — 87077 CULTURE AEROBIC IDENTIFY: CPT

## 2019-07-30 PROCEDURE — 87070 CULTURE OTHR SPECIMN AEROBIC: CPT

## 2019-07-30 PROCEDURE — 11042 DBRDMT SUBQ TIS 1ST 20SQCM/<: CPT

## 2019-07-30 PROCEDURE — 11042 DBRDMT SUBQ TIS 1ST 20SQCM/<: CPT | Performed by: FAMILY MEDICINE

## 2019-07-30 PROCEDURE — 87186 SC STD MICRODIL/AGAR DIL: CPT

## 2019-07-30 RX ORDER — LIDOCAINE HYDROCHLORIDE 20 MG/ML
2 INJECTION, SOLUTION EPIDURAL; INFILTRATION; INTRACAUDAL; PERINEURAL ONCE
Status: DISCONTINUED | OUTPATIENT
Start: 2019-07-30 | End: 2019-07-31 | Stop reason: HOSPADM

## 2019-07-30 NOTE — PROGRESS NOTES
11:46 AM   Wound Assessment Red 6/25/2019  3:15 PM   Drainage Amount None 6/25/2019  3:15 PM   Drainage Description Yellow 6/6/2019 11:24 AM   Odor None 6/6/2019 11:24 AM   Kenna-wound Assessment Pink 6/25/2019  3:15 PM   Red%Wound Bed 50 6/6/2019 11:24 AM   Yellow%Wound Bed 50 6/6/2019 11:24 AM   Number of days: 54       Wound 07/30/19 Foot Plantar;Left #2 new left foot plantar aquired 7/23/19 (Active)   Wound Image   7/30/2019  3:45 PM   Dressing Status Clean;Dry; Intact 7/30/2019  5:01 PM   Dressing Changed Changed/New 7/30/2019  5:01 PM   Dressing/Treatment Collagen with Ag;Dry Dressing; Other (comment) 7/30/2019  5:01 PM   Wound Cleansed Rinsed/Irrigated with saline 7/30/2019  5:01 PM   Wound Length (cm) 0.9 cm 7/30/2019  3:45 PM   Wound Width (cm) 0.6 cm 7/30/2019  3:45 PM   Wound Depth (cm) 0.2 cm 7/30/2019  3:45 PM   Wound Surface Area (cm^2) 0.54 cm^2 7/30/2019  3:45 PM   Wound Volume (cm^3) 0.11 cm^3 7/30/2019  3:45 PM   Post-Procedure Length (cm) 1.2 cm 7/30/2019  4:56 PM   Post-Procedure Width (cm) 1.5 cm 7/30/2019  4:56 PM   Post-Procedure Depth (cm) 0.2 cm 7/30/2019  4:56 PM   Post-Procedure Surface Area (cm^2) 1.8 cm^2 7/30/2019  4:56 PM   Post-Procedure Volume (cm^3) 0.36 cm^3 7/30/2019  4:56 PM   Wound Assessment Duran;Pale 7/30/2019  3:45 PM   Drainage Amount Small 7/30/2019  3:45 PM   Drainage Description Yellow 7/30/2019  3:45 PM   Odor None 7/30/2019  3:45 PM   Kenna-wound Assessment Maceration 7/30/2019  3:45 PM   Number of days: 0     Wound   serous exudate noted, is macerated  Errythema - Present    (this wound was originally measured on:)            Measurements shown are from today's visit.   Wound 07/30/19 Foot Plantar;Left #2 new left foot plantar aquired 7/23/19-Wound Assessment: Pink, Pale     Wound 07/30/19 Foot Plantar;Left #2 new left foot plantar aquired 7/23/19-Wound Assessment: Pink, Pale  Wound 07/30/19 Foot Plantar;Left #2 new left foot plantar aquired 7/23/19-Kenna-wound Assessment: Maceration  Wound 07/30/19 Foot Plantar;Left #2 new left foot plantar aquired 7/23/19-Drainage Amount: Small  Wound 07/30/19 Foot Plantar;Left #2 new left foot plantar aquired 7/23/19-Drainage Description: Yellow  Wound 07/30/19 Foot Plantar;Left #2 new left foot plantar aquired 7/23/19-Odor: None         Wound 07/24/14 Venous ulcer Leg Right;Upper new wound, #1 venous. right upper leg, date of onset may 2014 (Active)   Number of days: 1832       Wound 07/24/14 Venous ulcer Ankle Medial new wound #2 right medial ankle. venous date of onset may 2014 (Active)   Number of days: 1832       Wound 08/07/14 Diabetic Ulcer Heel Posterior new wound #3 diabetic ulcer right heel. briones 2. date of onset aug 2014 (Active)   Number of days: 1818       Incision 10/19/18 Foot Left (Active)   Number of days: 284       Wound 06/06/19 Ankle Posterior; Left new #1 left post ankle. pressure.  onset may 2019 (Active)   Wound Image   7/30/2019  3:45 PM   Wound Pressure Stage  2 6/6/2019 11:46 AM   Dressing/Treatment Wound gel;Dry Dressing 6/6/2019 11:56 AM   Wound Cleansed Rinsed/Irrigated with saline 6/6/2019 11:56 AM   Wound Length (cm) 0 cm 7/30/2019  3:45 PM   Wound Width (cm) 0 cm 7/30/2019  3:45 PM   Wound Depth (cm) 0 cm 7/30/2019  3:45 PM   Wound Surface Area (cm^2) 0 cm^2 7/30/2019  3:45 PM   Change in Wound Size % (l*w) 100 7/30/2019  3:45 PM   Wound Volume (cm^3) 0 cm^3 7/30/2019  3:45 PM   Wound Healing % 100 7/30/2019  3:45 PM   Post-Procedure Length (cm) 0.7 cm 6/6/2019 11:46 AM   Post-Procedure Width (cm) 0.6 cm 6/6/2019 11:46 AM   Post-Procedure Depth (cm) 0.2 cm 6/6/2019 11:46 AM   Post-Procedure Surface Area (cm^2) 0.42 cm^2 6/6/2019 11:46 AM   Post-Procedure Volume (cm^3) 0.08 cm^3 6/6/2019 11:46 AM   Wound Assessment Red 6/25/2019  3:15 PM   Drainage Amount None 6/25/2019  3:15 PM   Drainage Description Yellow 6/6/2019 11:24 AM   Odor None 6/6/2019 11:24 AM   Kenna-wound Assessment Pink 6/25/2019  3:15 PM   Red%Wound Bed refer to Nurses notes for pre and post debridement dimensions. Wound was irrigated with normal saline solution. Bleeding was with a moderate amount of bleeding, and controlled with pressure. Patient tolerated procedure well and was given proper instruction. Diagnosis: pressure ulcer: Stage III                      Patient Active Problem List   Diagnosis Code    Knee pain M25.569    Left knee DJD M17.12    Status post total knee replacement Z96.659    Rheumatoid arthritis of hand ([facility] [address] M06.9    Chronic venous hypertension w/ulcer and inflammation involv right side ([facility]) I87.331, L97.919    C6 radiculopathy M54.12    Rheumatoid arthritis involving vertebra with positive rheumatoid factor ([facility]) M45.9    Decreased  strength R29.898    Non-healing ulcer of foot, left, with fat layer exposed ([facility] [address] B00.635           Plan:      Treatment & Plan: 1.Excisional Debridement                              2. Alginate                              3. Discussed appropriate home care of this wound. 4. Patient instructions were given. 5. Follow Up in 1 week(s).                                      Brittney Aguirre DO                           7/30/19     5:13 PM

## 2019-07-30 NOTE — CARE COORDINATION
Community Resources  Diabetes Education:  Completed (Comment: Pt states completed previously)  Marketplace Navigator:  Completed (Comment: Referral to [employer])  Physical Therapy:  Not Started (Comment: 6/11/19- pt ordered PT for neck pain.)  Registered Dietician:  Completed (Comment: 5/20/19- Mailed Dial-a -dietician resource)  Senior Services:  Completed (Comment: 5/20/19- AAA11 info mailed out)  Specialty Services Referral:  Completed (Comment: 5/20/19- to Neurology and Wound Care)  Other Services: In Process (Comment: 7/25/19: HHA services and DME (wheelchair) evaluation discussed )  Zone Management Tools:  Completed (Comment: 5/20/19: Mailed DM Zone Sheet)         Goals Addressed                 This Visit's Progress     Community Resource Goal   Improving     I will follow recommendations for referrals for needed services as determined by PCP. Zita Pyle Barriers: lack of motivation, financial, overwhelmed by complexity of regimen and lack of education  Plan for overcoming my barriers: Will work with [facility] and family  Confidence: 7/10  Anticipated Goal Completion Date: 8/30/19       Conditions and Symptoms   On track     I will schedule office visits, as directed by my provider. I will notify my provider of any barriers to my plan of care. I will notify my provider of any symptoms that indicate a worsening of my condition. Barriers: lack of support, overwhelmed by complexity of regimen and stress  Plan for overcoming my barriers: Will work with [facility], PCP, and family  Confidence: 7/10  Anticipated Goal Completion Date: 6/30/19; 7/30/19; 8/30/19 7/03/19:   Pt does not have all appts scheduled yet at this time. Goal completion date adjusted. 7/15/19: Pt has x-ray appt for this week 7/18/19. Her  will provide transportation. Then she will schedule her f/u appt when xray is completed. She has appt with PCP and wound care scheduled.    7/25/19:  Goal completion date updated as

## 2019-08-01 LAB — ANAEROBIC CULTURE: NORMAL

## 2019-08-03 LAB
ORGANISM: ABNORMAL
WOUND/ABSCESS: ABNORMAL

## 2019-08-06 ENCOUNTER — CARE COORDINATION (OUTPATIENT)
Dept: CARE COORDINATION | Age: 64
End: 2019-08-06

## 2019-08-06 ENCOUNTER — HOSPITAL ENCOUNTER (OUTPATIENT)
Dept: WOUND CARE | Age: 64
Discharge: HOME | End: 2019-08-06
Payer: COMMERCIAL

## 2019-08-12 ENCOUNTER — CARE COORDINATION (OUTPATIENT)
Dept: CARE COORDINATION | Age: 64
End: 2019-08-12

## 2019-08-13 ENCOUNTER — HOSPITAL ENCOUNTER (OUTPATIENT)
Dept: WOUND CARE | Age: 64
Discharge: HOME | End: 2019-08-13
Payer: COMMERCIAL

## 2019-08-13 VITALS
BODY MASS INDEX: 23.16 KG/M2 | SYSTOLIC BLOOD PRESSURE: 108 MMHG | TEMPERATURE: 99.6 F | HEIGHT: 65 IN | RESPIRATION RATE: 18 BRPM | DIASTOLIC BLOOD PRESSURE: 70 MMHG | WEIGHT: 139 LBS | HEART RATE: 104 BPM

## 2019-08-13 PROCEDURE — 11042 DBRDMT SUBQ TIS 1ST 20SQCM/<: CPT | Performed by: FAMILY MEDICINE

## 2019-08-13 PROCEDURE — 87070 CULTURE OTHR SPECIMN AEROBIC: CPT

## 2019-08-13 PROCEDURE — 87186 SC STD MICRODIL/AGAR DIL: CPT

## 2019-08-13 PROCEDURE — 87075 CULTR BACTERIA EXCEPT BLOOD: CPT

## 2019-08-13 PROCEDURE — 11042 DBRDMT SUBQ TIS 1ST 20SQCM/<: CPT

## 2019-08-13 PROCEDURE — 87077 CULTURE AEROBIC IDENTIFY: CPT

## 2019-08-13 NOTE — CARE COORDINATION
Ambulatory Care Coordination Note  8/13/2019  CM Risk Score: 5  Charlson 10 Year Mortality Risk Score: 47%     ACC: Yin Kincaid, RN    Summary Note: Called pt's Darius Sample, to follow up on progress, reinforce previous education/ provide pt education, and discuss any new issues or concerns. LVMM requesting a call back to discuss. Care Coordination Interventions    Referral from Primary Care Provider:  Yes  Suggested Interventions and Community Resources  Diabetes Education:  Completed (Comment: Pt states completed previously)  Marketplace Navigator:  Completed (Comment: Referral to [facility])  Physical Therapy:  Not Started (Comment: 6/11/19- pt ordered PT for neck pain.)  Registered Dietician:  Completed (Comment: 5/20/19- Mailed Dial-a -dietician resource)  Senior Services:  Completed (Comment: 5/20/19- AAA11 info mailed out)  Specialty Services Referral:  Completed (Comment: 5/20/19- to Neurology and Wound Care)  Other Services: In Process (Comment: 7/25/19: HHA services and DME (wheelchair) evaluation discussed )  Zone Management Tools:  Completed (Comment: 5/20/19: Mailed DM Zone Sheet)         Goals Addressed    None         Prior to Admission medications    Medication Sig Start Date End Date Taking? Authorizing Provider   traMADol (ULTRAM) 50 MG tablet Take 1 tablet by mouth every 6 hours as needed for Pain for up to 30 days.  7/26/19 8/25/19  YOKO Triana CNP   furosemide (LASIX) 20 MG tablet Take 1 tablet by mouth daily 7/26/19   YOKO Triana CNP   cephALEXin (KEFLEX) 500 MG capsule Take 1 capsule by mouth 4 times daily 7/26/19   Chevis Magee, APRN - CNP   cetirizine (ZYRTEC ALLERGY) 10 MG tablet Take 1 tablet by mouth daily 5/17/19   YOKO Triana CNP   tiZANidine (ZANAFLEX) 2 MG capsule Take 1 capsule by mouth 3 times daily as needed for Muscle spasms 4/18/19   YOKO Triana CNP   montelukast (SINGULAIR) 10 MG tablet Take 1 tablet by mouth nightly 3/1/19   YOKO Quintero CNP   Ferrous Sulfate (IRON) 325 (65 Fe) MG TABS Take 325 mg by mouth daily 3/1/19   YOKO Quintero CNP   fluticasone (FLONASE) 50 MCG/ACT nasal spray 1 spray by Nasal route daily    Historical Provider, MD       Future Appointments   Date Time Provider Kurt Ashleigh   8/13/2019  3:15 PM [facility] WOUND ROOM 5 [facility] WOUND None   8/23/2019  1:15 PM YOKO Montes CNP [facility]

## 2019-08-14 ENCOUNTER — CARE COORDINATION (OUTPATIENT)
Dept: CARE COORDINATION | Age: 64
End: 2019-08-14

## 2019-08-14 ENCOUNTER — TELEPHONE (OUTPATIENT)
Dept: NEUROSURGERY | Age: 64
End: 2019-08-14

## 2019-08-14 SDOH — HEALTH STABILITY: PHYSICAL HEALTH: ON AVERAGE, HOW MANY DAYS PER WEEK DO YOU ENGAGE IN MODERATE TO STRENUOUS EXERCISE (LIKE A BRISK WALK)?: 0 DAYS

## 2019-08-14 SDOH — ECONOMIC STABILITY: TRANSPORTATION INSECURITY
IN THE PAST 12 MONTHS, HAS LACK OF TRANSPORTATION KEPT YOU FROM MEETINGS, WORK, OR FROM GETTING THINGS NEEDED FOR DAILY LIVING?: NO

## 2019-08-14 SDOH — HEALTH STABILITY: PHYSICAL HEALTH: ON AVERAGE, HOW MANY MINUTES DO YOU ENGAGE IN EXERCISE AT THIS LEVEL?: 0 MIN

## 2019-08-14 SDOH — ECONOMIC STABILITY: FOOD INSECURITY: WITHIN THE PAST 12 MONTHS, YOU WORRIED THAT YOUR FOOD WOULD RUN OUT BEFORE YOU GOT MONEY TO BUY MORE.: NEVER TRUE

## 2019-08-14 SDOH — ECONOMIC STABILITY: INCOME INSECURITY: HOW HARD IS IT FOR YOU TO PAY FOR THE VERY BASICS LIKE FOOD, HOUSING, MEDICAL CARE, AND HEATING?: SOMEWHAT HARD

## 2019-08-14 SDOH — ECONOMIC STABILITY: TRANSPORTATION INSECURITY
IN THE PAST 12 MONTHS, HAS THE LACK OF TRANSPORTATION KEPT YOU FROM MEDICAL APPOINTMENTS OR FROM GETTING MEDICATIONS?: NO

## 2019-08-14 SDOH — ECONOMIC STABILITY: FOOD INSECURITY: WITHIN THE PAST 12 MONTHS, THE FOOD YOU BOUGHT JUST DIDN'T LAST AND YOU DIDN'T HAVE MONEY TO GET MORE.: NEVER TRUE

## 2019-08-14 SDOH — HEALTH STABILITY: MENTAL HEALTH
STRESS IS WHEN SOMEONE FEELS TENSE, NERVOUS, ANXIOUS, OR CAN'T SLEEP AT NIGHT BECAUSE THEIR MIND IS TROUBLED. HOW STRESSED ARE YOU?: TO SOME EXTENT

## 2019-08-14 NOTE — CARE COORDINATION
Claire)  Physical Therapy:  Not Started (Comment: 6/11/19- pt ordered PT for neck pain.)  Registered Dietician:  Completed (Comment: 5/20/19- Mailed Dial-a -dietician resource)  Senior Services:  Completed (Comment: 5/20/19- AAA11 info mailed out)  Social Work:  In Process (Comment: Inbasket referral to [facility] SW)  Specialty Services Referral:  Completed (Comment: 5/20/19- to Neurology and Wound Care)  Other Services: In Process (Comment: 7/25/19: HHA services and DME (wheelchair) evaluation discussed )  Zone Management Tools:  Completed (Comment: 5/20/19: Mailed DM Zone Sheet)         Goals Addressed                 This Visit's Progress     Community Resource Goal   No change     I will follow recommendations for referrals for needed services as determined by PCP. Cary Allen Barriers: lack of motivation, financial, overwhelmed by complexity of regimen and lack of education  Plan for overcoming my barriers: Will work with [facility] and family  Confidence: 7/10  Anticipated Goal Completion Date: 8/30/19       Conditions and Symptoms   On track     I will schedule office visits, as directed by my provider. I will notify my provider of any barriers to my plan of care. I will notify my provider of any symptoms that indicate a worsening of my condition. Barriers: lack of support, overwhelmed by complexity of regimen and stress  Plan for overcoming my barriers: Will work with [facility], PCP, and family  Confidence: 7/10  Anticipated Goal Completion Date: 6/30/19; 7/30/19; 8/30/19 7/03/19:   Pt does not have all appts scheduled yet at this time. Goal completion date adjusted. 7/15/19: Pt has x-ray appt for this week 7/18/19. Her  will provide transportation. Then she will schedule her f/u appt when xray is completed. She has appt with PCP and wound care scheduled. 7/25/19:  Goal completion date updated as  Pt did not complete x-ray. Pt did not call for PCP appt for worseing of eye symptoms.

## 2019-08-15 DIAGNOSIS — Z74.09 MOBILITY IMPAIRED: ICD-10-CM

## 2019-08-15 DIAGNOSIS — Z91.81 AT HIGH RISK FOR INJURY RELATED TO FALL: ICD-10-CM

## 2019-08-15 DIAGNOSIS — R53.1 GENERALIZED WEAKNESS: ICD-10-CM

## 2019-08-15 DIAGNOSIS — M45.9 RHEUMATOID ARTHRITIS INVOLVING VERTEBRA WITH POSITIVE RHEUMATOID FACTOR (HCC): Primary | ICD-10-CM

## 2019-08-15 RX ORDER — DOXYCYCLINE HYCLATE 100 MG/1
100 CAPSULE ORAL 2 TIMES DAILY
Status: ON HOLD | COMMUNITY
End: 2019-09-03 | Stop reason: HOSPADM

## 2019-08-15 RX ORDER — FLASH GLUCOSE SENSOR
1 KIT MISCELLANEOUS
Qty: 2 EACH | Refills: 5 | Status: SHIPPED | OUTPATIENT
Start: 2019-08-15 | End: 2019-08-23

## 2019-08-15 NOTE — TELEPHONE ENCOUNTER
Yes I am more than willing to place any orders that will help Shanice Mayberry. I made a note last time when I saw her that I wanted to place a Home Health order but was waiting on the family to tell me which company they wanted to use. I will put the orders in today. Thank you.

## 2019-08-16 ENCOUNTER — CARE COORDINATION (OUTPATIENT)
Dept: CARE COORDINATION | Age: 64
End: 2019-08-16

## 2019-08-16 LAB — ANAEROBIC CULTURE: NORMAL

## 2019-08-16 SDOH — SOCIAL STABILITY: SOCIAL NETWORK: HOW OFTEN DO YOU ATTEND CHURCH OR RELIGIOUS SERVICES?: 1 TO 4 TIMES PER YEAR

## 2019-08-16 SDOH — SOCIAL STABILITY: SOCIAL NETWORK: HOW OFTEN DO YOU ATTENT MEETINGS OF THE CLUB OR ORGANIZATION YOU BELONG TO?: NEVER

## 2019-08-16 SDOH — SOCIAL STABILITY: SOCIAL NETWORK: IN A TYPICAL WEEK, HOW MANY TIMES DO YOU TALK ON THE PHONE WITH FAMILY, FRIENDS, OR NEIGHBORS?: ONCE A WEEK

## 2019-08-16 SDOH — SOCIAL STABILITY: SOCIAL NETWORK
DO YOU BELONG TO ANY CLUBS OR ORGANIZATIONS SUCH AS CHURCH GROUPS UNIONS, FRATERNAL OR ATHLETIC GROUPS, OR SCHOOL GROUPS?: NO

## 2019-08-16 SDOH — SOCIAL STABILITY: SOCIAL NETWORK: HOW OFTEN DO YOU GET TOGETHER WITH FRIENDS OR RELATIVES?: ONCE A WEEK

## 2019-08-16 SDOH — SOCIAL STABILITY: SOCIAL NETWORK: ARE YOU MARRIED, WIDOWED, DIVORCED, SEPARATED, NEVER MARRIED, OR LIVING WITH A PARTNER?: MARRIED

## 2019-08-16 NOTE — CARE COORDINATION
Tools:  Completed (Comment: 5/20/19: Mailed DM Zone Sheet)         Goals Addressed                 This Visit's Progress     Community Resource Goal   Improving     I will follow recommendations for referrals for needed services as determined by PCP. Db Ambrosio Barriers: lack of motivation, financial, overwhelmed by complexity of regimen and lack of education  Plan for overcoming my barriers: Will work with [facility] and family  Confidence: 7/10  Anticipated Goal Completion Date: 8/30/19       Conditions and Symptoms   On track     I will schedule office visits, as directed by my provider. I will notify my provider of any barriers to my plan of care. I will notify my provider of any symptoms that indicate a worsening of my condition. Barriers: lack of support, overwhelmed by complexity of regimen and stress  Plan for overcoming my barriers: Will work with [facility], PCP, and family  Confidence: 7/10  Anticipated Goal Completion Date: 6/30/19; 7/30/19; 8/30/19 7/03/19:   Pt does not have all appts scheduled yet at this time. Goal completion date adjusted. 7/15/19: Pt has x-ray appt for this week 7/18/19. Her  will provide transportation. Then she will schedule her f/u appt when xray is completed. She has appt with PCP and wound care scheduled. 7/25/19:  Goal completion date updated as  Pt did not complete x-ray. Pt did not call for PCP appt for worseing of eye symptoms. Prior to Admission medications    Medication Sig Start Date End Date Taking? Authorizing Provider   doxycycline hyclate (VIBRAMYCIN) 100 MG capsule Take 100 mg by mouth 2 times daily    Historical Provider, MD   Continuous Blood Gluc Sensor (FREESTYLE MISA 14 DAY SENSOR) MISC 1 Device by Does not apply route every 14 days 8/15/19   Kvng Encinas APRN - CNP   traMADol (ULTRAM) 50 MG tablet Take 1 tablet by mouth every 6 hours as needed for Pain for up to 30 days.  7/26/19 8/25/19  YOKO Krishnamurthy - CNP   furosemide (LASIX)

## 2019-08-17 LAB
ORGANISM: ABNORMAL
ORGANISM: ABNORMAL
WOUND/ABSCESS: ABNORMAL

## 2019-08-20 ENCOUNTER — HOSPITAL ENCOUNTER (OUTPATIENT)
Dept: WOUND CARE | Age: 64
Discharge: HOME | End: 2019-08-20
Payer: COMMERCIAL

## 2019-08-23 ENCOUNTER — ANCILLARY ORDERS (OUTPATIENT)
Dept: NEUROSURGERY | Age: 64
End: 2019-08-23

## 2019-08-23 ENCOUNTER — OFFICE VISIT (OUTPATIENT)
Dept: FAMILY MEDICINE CLINIC | Age: 64
End: 2019-08-23
Payer: COMMERCIAL

## 2019-08-23 VITALS
RESPIRATION RATE: 14 BRPM | SYSTOLIC BLOOD PRESSURE: 112 MMHG | BODY MASS INDEX: 23.49 KG/M2 | TEMPERATURE: 97.8 F | DIASTOLIC BLOOD PRESSURE: 62 MMHG | HEART RATE: 50 BPM | HEIGHT: 65 IN | OXYGEN SATURATION: 95 %

## 2019-08-23 DIAGNOSIS — M54.2 NECK PAIN OF OVER 3 MONTHS DURATION: ICD-10-CM

## 2019-08-23 DIAGNOSIS — M45.9 RHEUMATOID ARTHRITIS INVOLVING VERTEBRA WITH POSITIVE RHEUMATOID FACTOR (HCC): ICD-10-CM

## 2019-08-23 DIAGNOSIS — L97.919 CHRONIC VENOUS HYPERTENSION W/ULCER AND INFLAMMATION INVOLV RIGHT SIDE (HCC): ICD-10-CM

## 2019-08-23 DIAGNOSIS — I87.331 CHRONIC VENOUS HYPERTENSION W/ULCER AND INFLAMMATION INVOLV RIGHT SIDE (HCC): ICD-10-CM

## 2019-08-23 DIAGNOSIS — L97.522 NON-HEALING ULCER OF FOOT, LEFT, WITH FAT LAYER EXPOSED (HCC): Primary | ICD-10-CM

## 2019-08-23 PROCEDURE — 99213 OFFICE O/P EST LOW 20 MIN: CPT | Performed by: NURSE PRACTITIONER

## 2019-08-23 ASSESSMENT — ENCOUNTER SYMPTOMS
BACK PAIN: 1
VOMITING: 0
WHEEZING: 0
SHORTNESS OF BREATH: 0
COUGH: 0
DIARRHEA: 0
NAUSEA: 0
CONSTIPATION: 0

## 2019-08-26 ENCOUNTER — CARE COORDINATION (OUTPATIENT)
Dept: CARE COORDINATION | Age: 64
End: 2019-08-26

## 2019-08-27 ENCOUNTER — HOSPITAL ENCOUNTER (OUTPATIENT)
Dept: WOUND CARE | Age: 64
Discharge: HOME | End: 2019-08-27
Payer: COMMERCIAL

## 2019-08-27 VITALS
HEART RATE: 84 BPM | SYSTOLIC BLOOD PRESSURE: 118 MMHG | DIASTOLIC BLOOD PRESSURE: 74 MMHG | TEMPERATURE: 97.7 F | HEIGHT: 65 IN | BODY MASS INDEX: 23.16 KG/M2 | RESPIRATION RATE: 16 BRPM | WEIGHT: 139 LBS

## 2019-08-27 DIAGNOSIS — L97.522 NON-HEALING ULCER OF FOOT, LEFT, WITH FAT LAYER EXPOSED (HCC): ICD-10-CM

## 2019-08-27 PROCEDURE — 11106 INCAL BX SKN SINGLE LES: CPT

## 2019-08-27 PROCEDURE — 88304 TISSUE EXAM BY PATHOLOGIST: CPT

## 2019-08-27 RX ORDER — TRAMADOL HYDROCHLORIDE 50 MG/1
50 TABLET ORAL EVERY 6 HOURS PRN
Status: ON HOLD | COMMUNITY
End: 2019-09-03 | Stop reason: SDUPTHER

## 2019-08-27 ASSESSMENT — PAIN SCALES - GENERAL: PAINLEVEL_OUTOF10: 0

## 2019-08-28 NOTE — DISCHARGE INSTR - COC
Decreased  strength R29.898    Non-healing ulcer of foot, left, with fat layer exposed ([facility] [address] L97.522       Isolation/Infection:   Isolation          No Isolation            Nurse Assessment:  Last Vital Signs: There were no vitals taken for this visit. Last documented pain score (0-10 scale):    Last Weight:   Wt Readings from Last 1 Encounters:   19 139 lb (63 kg)     Mental Status:  {IP PT MENTAL STATUS:73083}    IV Access:  { KASEY IV ACCESS:566122767}    Nursing Mobility/ADLs:  Walking   {CHP DME BLJB:851423767}  Transfer  {CHP DME GWHL:249346242}  Bathing  {CHP DME TNQA:934922644}  Dressing  {CHP DME AJBX:928626351}  Toileting  {CHP DME GXV}  Feeding  {CHP DME NPEW:838471025}  Med Admin  {CHP DME QXFW:615278319}  Med Delivery   { KASEY MED Delivery:238308456}    Wound Care Documentation and Therapy:  Wound 14 Venous ulcer Leg Right;Upper new wound, #1 venous. right upper leg, date of onset may 2014 (Active)   Number of days: 1861       Wound 14 Venous ulcer Ankle Medial new wound #2 right medial ankle. venous date of onset may 2014 (Active)   Number of days: 1861       Wound 14 Diabetic Ulcer Heel Posterior new wound #3 diabetic ulcer right heel. briones 2. date of onset aug 2014 (Active)   Number of days: 1847       Incision 10/19/18 Foot Left (Active)   Number of days: 313       Wound 19 Ankle Posterior; Left new #1 left post ankle. pressure. onset may 2019 (Active)   Number of days: 83       Wound 19 Foot Plantar;Left #2 new left foot plantar aquired 19 (Active)   Wound Image   2019 11:13 AM   Dressing Status Clean;Dry; Intact 2019  5:03 PM   Dressing Changed Changed/New 2019  5:03 PM   Dressing/Treatment Collagen with Ag;Dry Dressing 2019  5:03 PM   Wound Cleansed Rinsed/Irrigated with saline 2019  5:03 PM   Wound Length (cm) 0.3 cm 2019 11:13 AM   Wound Width (cm) 0.3 cm 2019 11:13 AM   Wound Depth (cm) 0 cm 8/27/2019 11:13 AM   Wound Surface Area (cm^2) 0.09 cm^2 8/27/2019 11:13 AM   Change in Wound Size % (l*w) 83.33 8/27/2019 11:13 AM   Wound Volume (cm^3) 0 cm^3 8/27/2019 11:13 AM   Wound Healing % 100 8/27/2019 11:13 AM   Wound Assessment Dry 8/27/2019 11:13 AM   Drainage Amount None 8/27/2019 11:13 AM   Drainage Description Serous 8/13/2019  3:57 PM   Odor None 8/27/2019 11:13 AM   Kenna-wound Assessment Dry 8/27/2019 11:13 AM   Number of days: 29       Wound 08/13/19 Toe (Comment  which one) Left;Dorsal #3 acquired 8-5-19 3rd toe (Active)   Wound Image   8/27/2019 11:13 AM   Wound Pressure Stage  3 8/13/2019  3:57 PM   Dressing Status Clean;Dry; Intact 8/13/2019  5:03 PM   Dressing Changed Changed/New 8/13/2019  5:03 PM   Dressing/Treatment Dry Dressing;Alginate with Ag 8/13/2019  5:03 PM   Wound Cleansed Rinsed/Irrigated with saline 8/13/2019  5:03 PM   Wound Length (cm) 1.5 cm 8/27/2019 11:13 AM   Wound Width (cm) 1.5 cm 8/27/2019 11:13 AM   Wound Depth (cm) 0.2 cm 8/27/2019 11:13 AM   Wound Surface Area (cm^2) 2.25 cm^2 8/27/2019 11:13 AM   Change in Wound Size % (l*w) -177.78 8/27/2019 11:13 AM   Wound Volume (cm^3) 0.45 cm^3 8/27/2019 11:13 AM   Wound Healing % -181 8/27/2019 11:13 AM   Post-Procedure Length (cm) 1.5 cm 8/27/2019 11:35 AM   Post-Procedure Width (cm) 1.5 cm 8/27/2019 11:35 AM   Post-Procedure Depth (cm) 0.2 cm 8/27/2019 11:35 AM   Post-Procedure Surface Area (cm^2) 2.25 cm^2 8/27/2019 11:35 AM   Post-Procedure Volume (cm^3) 0.45 cm^3 8/27/2019 11:35 AM   Wound Assessment Drainage;Red;Yellow 8/27/2019 11:13 AM   Drainage Amount Moderate 8/27/2019 11:13 AM   Drainage Description Serosanguinous 8/27/2019 11:13 AM   Odor None 8/27/2019 11:13 AM   Kenna-wound Assessment Pink 8/27/2019 11:13 AM   Number of days: 15        Elimination:  Continence:   · Bowel: {YES / HL:34593}  · Bladder: {YES / ME:69297}  Urinary Catheter: {Urinary Catheter:225788116}   Colostomy/Ileostomy/Ileal Conduit: {YES / VN:30507}       Date of Last BM: ***  No intake or output data in the 24 hours ending 19 1748  No intake/output data recorded.     Safety Concerns:     [address] KASEY Safety Concerns:245865186}    Impairments/Disabilities:      [address] Jaimes [facility] Impairments/Disabilities:857542654}    Nutrition Therapy:  Current Nutrition Therapy:   [address] Jaimes [facility] Diet List:601461878}    Routes of Feeding: {CHP DME Other Feedings:319970333}  Liquids: {Slp liquid thickness:19938}  Daily Fluid Restriction: {CHP DME Yes amt example:977365761}  Last Modified Barium Swallow with Video (Video Swallowing Test): {Done Not Done XZIT:952165728}    Treatments at the Time of Hospital Discharge:   Respiratory Treatments: ***  Oxygen Therapy:  {Therapy; copd oxygen:25363}  Ventilator:    { CC Vent QSNE:320313247}    Rehab Therapies: {THERAPEUTIC INTERVENTION:1967072422}  Weight Bearing Status/Restrictions: [address] [facility] Weight Bearin}  Other Medical Equipment (for information only, NOT a DME order):  {EQUIPMENT:669967638}  Other Treatments: ***    Patient's personal belongings (please select all that are sent with patient):  {[facility] DME Belongings:157374824}    RN SIGNATURE:  {Esignature:662263275}    CASE MANAGEMENT/SOCIAL WORK SECTION    Inpatient Status Date: ***    Readmission Risk Assessment Score:  Readmission Risk              Risk of Unplanned Readmission:        0           Discharging to Facility/ Agency   · Name:   · Address:  · Phone:  · Fax:    Dialysis Facility (if applicable)   · Name:  · Address:  · Dialysis Schedule:  · Phone:  · Fax:    / signature: {Esignature:627243464}    PHYSICIAN SECTION    Prognosis: {Prognosis:8731245996}    Condition at Discharge: [address] Jaimes Patient Condition:142893533}    Rehab Potential (if transferring to Rehab): {Prognosis:3062844158}    Recommended Labs or Other Treatments After Discharge: ***    Physician Certification: I certify the above information and transfer of Vic Craven  is necessary for

## 2019-08-29 ENCOUNTER — APPOINTMENT (OUTPATIENT)
Dept: GENERAL RADIOLOGY | Age: 64
DRG: 602 | End: 2019-08-29
Payer: COMMERCIAL

## 2019-08-29 ENCOUNTER — HOSPITAL ENCOUNTER (INPATIENT)
Age: 64
LOS: 6 days | Discharge: NURSING, REHAB OR LONG-TERM CARE | DRG: 602 | End: 2019-09-04
Attending: EMERGENCY MEDICINE | Admitting: INTERNAL MEDICINE
Payer: COMMERCIAL

## 2019-08-29 ENCOUNTER — APPOINTMENT (OUTPATIENT)
Dept: ULTRASOUND IMAGING | Age: 64
DRG: 602 | End: 2019-08-29
Payer: COMMERCIAL

## 2019-08-29 DIAGNOSIS — R53.1 GENERALIZED WEAKNESS: ICD-10-CM

## 2019-08-29 DIAGNOSIS — L97.529 ULCER OF LEFT FOOT, UNSPECIFIED ULCER STAGE (HCC): ICD-10-CM

## 2019-08-29 DIAGNOSIS — L03.116 LEFT LEG CELLULITIS: ICD-10-CM

## 2019-08-29 DIAGNOSIS — L03.116 CELLULITIS OF LEFT LOWER EXTREMITY: Primary | ICD-10-CM

## 2019-08-29 LAB
ALBUMIN SERPL-MCNC: 3.4 G/DL (ref 3.5–5.2)
ALP BLD-CCNC: 93 U/L (ref 35–104)
ALT SERPL-CCNC: 11 U/L (ref 0–32)
ANION GAP SERPL CALCULATED.3IONS-SCNC: 11 MMOL/L (ref 7–16)
AST SERPL-CCNC: 16 U/L (ref 0–31)
BASOPHILS ABSOLUTE: 0.05 E9/L (ref 0–0.2)
BASOPHILS RELATIVE PERCENT: 0.8 % (ref 0–2)
BILIRUB SERPL-MCNC: 0.3 MG/DL (ref 0–1.2)
BILIRUBIN URINE: NEGATIVE
BLOOD, URINE: NEGATIVE
BUN BLDV-MCNC: 17 MG/DL (ref 8–23)
CALCIUM SERPL-MCNC: 9.1 MG/DL (ref 8.6–10.2)
CHLORIDE BLD-SCNC: 99 MMOL/L (ref 98–107)
CLARITY: CLEAR
CO2: 28 MMOL/L (ref 22–29)
COLOR: YELLOW
CREAT SERPL-MCNC: 0.7 MG/DL (ref 0.5–1)
EKG ATRIAL RATE: 68 BPM
EKG P AXIS: 26 DEGREES
EKG P-R INTERVAL: 128 MS
EKG Q-T INTERVAL: 406 MS
EKG QRS DURATION: 82 MS
EKG QTC CALCULATION (BAZETT): 431 MS
EKG R AXIS: -17 DEGREES
EKG T AXIS: 32 DEGREES
EKG VENTRICULAR RATE: 68 BPM
EOSINOPHILS ABSOLUTE: 0.27 E9/L (ref 0.05–0.5)
EOSINOPHILS RELATIVE PERCENT: 4.6 % (ref 0–6)
GFR AFRICAN AMERICAN: >60
GFR NON-AFRICAN AMERICAN: >60 ML/MIN/1.73
GLUCOSE BLD-MCNC: 107 MG/DL (ref 74–99)
GLUCOSE URINE: NEGATIVE MG/DL
HCT VFR BLD CALC: 29.8 % (ref 34–48)
HEMOGLOBIN: 9.8 G/DL (ref 11.5–15.5)
IMMATURE GRANULOCYTES #: 0.01 E9/L
IMMATURE GRANULOCYTES %: 0.2 % (ref 0–5)
KETONES, URINE: NEGATIVE MG/DL
LACTIC ACID, SEPSIS: 0.6 MMOL/L (ref 0.5–1.9)
LEUKOCYTE ESTERASE, URINE: NEGATIVE
LYMPHOCYTES ABSOLUTE: 1.09 E9/L (ref 1.5–4)
LYMPHOCYTES RELATIVE PERCENT: 18.5 % (ref 20–42)
MCH RBC QN AUTO: 29.8 PG (ref 26–35)
MCHC RBC AUTO-ENTMCNC: 32.9 % (ref 32–34.5)
MCV RBC AUTO: 90.6 FL (ref 80–99.9)
MONOCYTES ABSOLUTE: 0.51 E9/L (ref 0.1–0.95)
MONOCYTES RELATIVE PERCENT: 8.7 % (ref 2–12)
NEUTROPHILS ABSOLUTE: 3.96 E9/L (ref 1.8–7.3)
NEUTROPHILS RELATIVE PERCENT: 67.2 % (ref 43–80)
NITRITE, URINE: NEGATIVE
PDW BLD-RTO: 13.1 FL (ref 11.5–15)
PH UA: 7.5 (ref 5–9)
PLATELET # BLD: 245 E9/L (ref 130–450)
PMV BLD AUTO: 9.8 FL (ref 7–12)
POTASSIUM REFLEX MAGNESIUM: 3.8 MMOL/L (ref 3.5–5)
PROTEIN UA: NEGATIVE MG/DL
RBC # BLD: 3.29 E12/L (ref 3.5–5.5)
SODIUM BLD-SCNC: 138 MMOL/L (ref 132–146)
SPECIFIC GRAVITY UA: 1.01 (ref 1–1.03)
T4 TOTAL: 7.8 MCG/DL (ref 4.5–11.7)
TOTAL CK: 103 U/L (ref 20–180)
TOTAL PROTEIN: 7.3 G/DL (ref 6.4–8.3)
TROPONIN: <0.01 NG/ML (ref 0–0.03)
TSH SERPL DL<=0.05 MIU/L-ACNC: 1.4 UIU/ML (ref 0.27–4.2)
UROBILINOGEN, URINE: 0.2 E.U./DL
WBC # BLD: 5.9 E9/L (ref 4.5–11.5)

## 2019-08-29 PROCEDURE — 93971 EXTREMITY STUDY: CPT

## 2019-08-29 PROCEDURE — 93010 ELECTROCARDIOGRAM REPORT: CPT | Performed by: INTERNAL MEDICINE

## 2019-08-29 PROCEDURE — 84436 ASSAY OF TOTAL THYROXINE: CPT

## 2019-08-29 PROCEDURE — 99285 EMERGENCY DEPT VISIT HI MDM: CPT

## 2019-08-29 PROCEDURE — 85025 COMPLETE CBC W/AUTO DIFF WBC: CPT

## 2019-08-29 PROCEDURE — 71045 X-RAY EXAM CHEST 1 VIEW: CPT

## 2019-08-29 PROCEDURE — 36415 COLL VENOUS BLD VENIPUNCTURE: CPT

## 2019-08-29 PROCEDURE — 81003 URINALYSIS AUTO W/O SCOPE: CPT

## 2019-08-29 PROCEDURE — 84443 ASSAY THYROID STIM HORMONE: CPT

## 2019-08-29 PROCEDURE — 73630 X-RAY EXAM OF FOOT: CPT

## 2019-08-29 PROCEDURE — 87088 URINE BACTERIA CULTURE: CPT

## 2019-08-29 PROCEDURE — 2580000003 HC RX 258: Performed by: INTERNAL MEDICINE

## 2019-08-29 PROCEDURE — 87040 BLOOD CULTURE FOR BACTERIA: CPT

## 2019-08-29 PROCEDURE — 93005 ELECTROCARDIOGRAM TRACING: CPT | Performed by: EMERGENCY MEDICINE

## 2019-08-29 PROCEDURE — 1200000000 HC SEMI PRIVATE

## 2019-08-29 PROCEDURE — 80053 COMPREHEN METABOLIC PANEL: CPT

## 2019-08-29 PROCEDURE — 83605 ASSAY OF LACTIC ACID: CPT

## 2019-08-29 PROCEDURE — 2580000003 HC RX 258: Performed by: EMERGENCY MEDICINE

## 2019-08-29 PROCEDURE — 82550 ASSAY OF CK (CPK): CPT

## 2019-08-29 PROCEDURE — 84484 ASSAY OF TROPONIN QUANT: CPT

## 2019-08-29 PROCEDURE — 6360000002 HC RX W HCPCS: Performed by: EMERGENCY MEDICINE

## 2019-08-29 RX ORDER — TIZANIDINE 4 MG/1
2 TABLET ORAL 3 TIMES DAILY PRN
Status: DISCONTINUED | OUTPATIENT
Start: 2019-08-29 | End: 2019-09-04 | Stop reason: HOSPADM

## 2019-08-29 RX ORDER — FERROUS SULFATE 325(65) MG
325 TABLET ORAL DAILY
Status: DISCONTINUED | OUTPATIENT
Start: 2019-08-30 | End: 2019-09-04 | Stop reason: HOSPADM

## 2019-08-29 RX ORDER — CETIRIZINE HYDROCHLORIDE 10 MG/1
10 TABLET ORAL DAILY
Status: DISCONTINUED | OUTPATIENT
Start: 2019-08-30 | End: 2019-09-04 | Stop reason: HOSPADM

## 2019-08-29 RX ORDER — FLUTICASONE PROPIONATE 50 MCG
1 SPRAY, SUSPENSION (ML) NASAL DAILY
Status: DISCONTINUED | OUTPATIENT
Start: 2019-08-30 | End: 2019-09-04 | Stop reason: HOSPADM

## 2019-08-29 RX ORDER — ONDANSETRON 2 MG/ML
4 INJECTION INTRAMUSCULAR; INTRAVENOUS EVERY 6 HOURS PRN
Status: DISCONTINUED | OUTPATIENT
Start: 2019-08-29 | End: 2019-09-04 | Stop reason: HOSPADM

## 2019-08-29 RX ORDER — FUROSEMIDE 20 MG/1
20 TABLET ORAL DAILY
Status: DISCONTINUED | OUTPATIENT
Start: 2019-08-30 | End: 2019-09-04 | Stop reason: HOSPADM

## 2019-08-29 RX ORDER — ACETAMINOPHEN 325 MG/1
650 TABLET ORAL EVERY 4 HOURS PRN
Status: DISCONTINUED | OUTPATIENT
Start: 2019-08-29 | End: 2019-09-04 | Stop reason: HOSPADM

## 2019-08-29 RX ORDER — TRAMADOL HYDROCHLORIDE 50 MG/1
50 TABLET ORAL EVERY 6 HOURS PRN
Status: DISCONTINUED | OUTPATIENT
Start: 2019-08-29 | End: 2019-09-04 | Stop reason: HOSPADM

## 2019-08-29 RX ORDER — SODIUM CHLORIDE 0.9 % (FLUSH) 0.9 %
10 SYRINGE (ML) INJECTION EVERY 12 HOURS SCHEDULED
Status: DISCONTINUED | OUTPATIENT
Start: 2019-08-29 | End: 2019-09-04 | Stop reason: HOSPADM

## 2019-08-29 RX ORDER — SODIUM CHLORIDE 0.9 % (FLUSH) 0.9 %
10 SYRINGE (ML) INJECTION PRN
Status: DISCONTINUED | OUTPATIENT
Start: 2019-08-29 | End: 2019-09-04 | Stop reason: HOSPADM

## 2019-08-29 RX ORDER — MONTELUKAST SODIUM 10 MG/1
10 TABLET ORAL NIGHTLY
Status: DISCONTINUED | OUTPATIENT
Start: 2019-08-29 | End: 2019-09-04 | Stop reason: HOSPADM

## 2019-08-29 RX ADMIN — WATER 2 G: 1 INJECTION INTRAMUSCULAR; INTRAVENOUS; SUBCUTANEOUS at 22:22

## 2019-08-29 RX ADMIN — DEXTROSE MONOHYDRATE 1250 MG: 50 INJECTION, SOLUTION INTRAVENOUS at 22:36

## 2019-08-29 RX ADMIN — Medication 10 ML: at 23:30

## 2019-08-29 ASSESSMENT — PAIN DESCRIPTION - DESCRIPTORS: DESCRIPTORS: ACHING

## 2019-08-29 ASSESSMENT — PAIN DESCRIPTION - LOCATION: LOCATION: LEG

## 2019-08-29 ASSESSMENT — PAIN SCALES - GENERAL: PAINLEVEL_OUTOF10: 8

## 2019-08-29 ASSESSMENT — PAIN DESCRIPTION - PAIN TYPE: TYPE: ACUTE PAIN

## 2019-08-29 NOTE — ED PROVIDER NOTES
oriented x3,  in NAD  Head: Normocephalic and atraumatic  Eyes: PERRL, EOMI  Mouth: Oropharynx clear, handling secretions, no trismus  Neck: Torticollis  Pulmonary: Lungs clear to auscultation bilaterally, no wheezes, rales, or rhonchi. Not in respiratory distress  Cardiovascular:  Regular rate. Regular rhythm. No murmurs, gallops, or rubs. 2+ distal pulses  Abdomen: Soft. Non tender. Non distended. +BS. No rebound, guarding, or rigidity. No pulsatile masses appreciated. Musculoskeletal: Moves all extremities x 4. Capillary refill less than 3 seconds. Hands significant for rheumatoid arthritis. Patient has erythema and warmth to the left lower extremity. Left calf tenderness to palpation. Left 3rd toe wound. Bilateral heel wounds. Skin: Erythema and warmth to the left lower extremity. Wounds to the left 3rd toe and bilateral heels. Neurologic: GCS 15, CN 2-12 grossly intact, no focal deficits, Decreased strength in the lower extremities bilaterally. Sensation intact. Psych: Normal Affect    -------------------------------------------------- RESULTS -------------------------------------------------  I have personally reviewed all laboratory and imaging results for this patient. Results are listed below.      LABS:  Results for orders placed or performed during the hospital encounter of 08/29/19   CBC Auto Differential   Result Value Ref Range    WBC 5.9 4.5 - 11.5 E9/L    RBC 3.29 (L) 3.50 - 5.50 E12/L    Hemoglobin 9.8 (L) 11.5 - 15.5 g/dL    Hematocrit 29.8 (L) 34.0 - 48.0 %    MCV 90.6 80.0 - 99.9 fL    MCH 29.8 26.0 - 35.0 pg    MCHC 32.9 32.0 - 34.5 %    RDW 13.1 11.5 - 15.0 fL    Platelets 357 359 - 285 E9/L    MPV 9.8 7.0 - 12.0 fL    Neutrophils % 67.2 43.0 - 80.0 %    Immature Granulocytes % 0.2 0.0 - 5.0 %    Lymphocytes % 18.5 (L) 20.0 - 42.0 %    Monocytes % 8.7 2.0 - 12.0 %    Eosinophils % 4.6 0.0 - 6.0 %    Basophils % 0.8 0.0 - 2.0 %    Neutrophils Absolute 3.96 1.80 - 7.30 E9/L    Immature

## 2019-08-30 PROBLEM — M43.8X2: Status: ACTIVE | Noted: 2019-08-30

## 2019-08-30 LAB
ALBUMIN SERPL-MCNC: 3.3 G/DL (ref 3.5–5.2)
ALP BLD-CCNC: 83 U/L (ref 35–104)
ALT SERPL-CCNC: 11 U/L (ref 0–32)
ANION GAP SERPL CALCULATED.3IONS-SCNC: 9 MMOL/L (ref 7–16)
ANTISTREPTOLYSIN-O: 131 IU/ML (ref 0–200)
AST SERPL-CCNC: 16 U/L (ref 0–31)
BILIRUB SERPL-MCNC: 0.2 MG/DL (ref 0–1.2)
BUN BLDV-MCNC: 15 MG/DL (ref 8–23)
C-REACTIVE PROTEIN: 0.6 MG/DL (ref 0–0.4)
CALCIUM SERPL-MCNC: 8.7 MG/DL (ref 8.6–10.2)
CHLORIDE BLD-SCNC: 101 MMOL/L (ref 98–107)
CHOLESTEROL, TOTAL: 138 MG/DL (ref 0–199)
CO2: 30 MMOL/L (ref 22–29)
CREAT SERPL-MCNC: 0.8 MG/DL (ref 0.5–1)
FERRITIN: 167 NG/ML
FOLATE: 16.3 NG/ML (ref 4.8–24.2)
GFR AFRICAN AMERICAN: >60
GFR NON-AFRICAN AMERICAN: >60 ML/MIN/1.73
GLUCOSE BLD-MCNC: 125 MG/DL (ref 74–99)
HBA1C MFR BLD: 5.8 % (ref 4–5.6)
HCT VFR BLD CALC: 29.3 % (ref 34–48)
HDLC SERPL-MCNC: 64 MG/DL
HEMOGLOBIN: 9.7 G/DL (ref 11.5–15.5)
IMMATURE RETIC FRACT: 8 % (ref 3–15.9)
IRON SATURATION: 23 % (ref 15–50)
IRON: 43 MCG/DL (ref 37–145)
LDL CHOLESTEROL CALCULATED: 60 MG/DL (ref 0–99)
MAGNESIUM: 1.9 MG/DL (ref 1.6–2.6)
MCH RBC QN AUTO: 30 PG (ref 26–35)
MCHC RBC AUTO-ENTMCNC: 33.1 % (ref 32–34.5)
MCV RBC AUTO: 90.7 FL (ref 80–99.9)
PDW BLD-RTO: 13.2 FL (ref 11.5–15)
PHOSPHORUS: 3.4 MG/DL (ref 2.5–4.5)
PLATELET # BLD: 228 E9/L (ref 130–450)
PMV BLD AUTO: 9.7 FL (ref 7–12)
POTASSIUM SERPL-SCNC: 3.5 MMOL/L (ref 3.5–5)
RBC # BLD: 3.23 E12/L (ref 3.5–5.5)
RETIC HGB EQUIVALENT: 32.4 PG (ref 28.2–36.6)
RETICULOCYTE ABSOLUTE COUNT: 0.04 E12/L
RETICULOCYTE COUNT PCT: 1.3 % (ref 0.4–1.9)
SEDIMENTATION RATE, ERYTHROCYTE: 30 MM/HR (ref 0–20)
SODIUM BLD-SCNC: 140 MMOL/L (ref 132–146)
TOTAL IRON BINDING CAPACITY: 191 MCG/DL (ref 250–450)
TOTAL PROTEIN: 7.2 G/DL (ref 6.4–8.3)
TRANSFERRIN: 148 MG/DL (ref 200–360)
TRIGL SERPL-MCNC: 70 MG/DL (ref 0–149)
VITAMIN B-12: 422 PG/ML (ref 211–946)
VLDLC SERPL CALC-MCNC: 14 MG/DL
WBC # BLD: 6.4 E9/L (ref 4.5–11.5)

## 2019-08-30 PROCEDURE — 2580000003 HC RX 258: Performed by: INTERNAL MEDICINE

## 2019-08-30 PROCEDURE — 97530 THERAPEUTIC ACTIVITIES: CPT | Performed by: PHYSICAL THERAPIST

## 2019-08-30 PROCEDURE — 83550 IRON BINDING TEST: CPT

## 2019-08-30 PROCEDURE — 97161 PT EVAL LOW COMPLEX 20 MIN: CPT | Performed by: PHYSICAL THERAPIST

## 2019-08-30 PROCEDURE — 99222 1ST HOSP IP/OBS MODERATE 55: CPT | Performed by: NEUROLOGICAL SURGERY

## 2019-08-30 PROCEDURE — 83735 ASSAY OF MAGNESIUM: CPT

## 2019-08-30 PROCEDURE — 6370000000 HC RX 637 (ALT 250 FOR IP): Performed by: INTERNAL MEDICINE

## 2019-08-30 PROCEDURE — 86140 C-REACTIVE PROTEIN: CPT

## 2019-08-30 PROCEDURE — 97530 THERAPEUTIC ACTIVITIES: CPT

## 2019-08-30 PROCEDURE — 84100 ASSAY OF PHOSPHORUS: CPT

## 2019-08-30 PROCEDURE — 82746 ASSAY OF FOLIC ACID SERUM: CPT

## 2019-08-30 PROCEDURE — 85651 RBC SED RATE NONAUTOMATED: CPT

## 2019-08-30 PROCEDURE — 6360000002 HC RX W HCPCS: Performed by: INTERNAL MEDICINE

## 2019-08-30 PROCEDURE — 97535 SELF CARE MNGMENT TRAINING: CPT

## 2019-08-30 PROCEDURE — 86060 ANTISTREPTOLYSIN O TITER: CPT

## 2019-08-30 PROCEDURE — 1200000000 HC SEMI PRIVATE

## 2019-08-30 PROCEDURE — 84466 ASSAY OF TRANSFERRIN: CPT

## 2019-08-30 PROCEDURE — 82728 ASSAY OF FERRITIN: CPT

## 2019-08-30 PROCEDURE — 97165 OT EVAL LOW COMPLEX 30 MIN: CPT

## 2019-08-30 PROCEDURE — 83036 HEMOGLOBIN GLYCOSYLATED A1C: CPT

## 2019-08-30 PROCEDURE — 82607 VITAMIN B-12: CPT

## 2019-08-30 PROCEDURE — 83540 ASSAY OF IRON: CPT

## 2019-08-30 PROCEDURE — 80061 LIPID PANEL: CPT

## 2019-08-30 PROCEDURE — 80053 COMPREHEN METABOLIC PANEL: CPT

## 2019-08-30 PROCEDURE — 85045 AUTOMATED RETICULOCYTE COUNT: CPT

## 2019-08-30 PROCEDURE — 85027 COMPLETE CBC AUTOMATED: CPT

## 2019-08-30 PROCEDURE — 36415 COLL VENOUS BLD VENIPUNCTURE: CPT

## 2019-08-30 RX ORDER — GABAPENTIN 100 MG/1
100 CAPSULE ORAL 3 TIMES DAILY
Status: DISCONTINUED | OUTPATIENT
Start: 2019-08-30 | End: 2019-09-04 | Stop reason: HOSPADM

## 2019-08-30 RX ADMIN — GABAPENTIN 100 MG: 100 CAPSULE ORAL at 20:03

## 2019-08-30 RX ADMIN — CETIRIZINE HYDROCHLORIDE 10 MG: 10 TABLET, FILM COATED ORAL at 10:08

## 2019-08-30 RX ADMIN — TRAMADOL HYDROCHLORIDE 50 MG: 50 TABLET, FILM COATED ORAL at 18:19

## 2019-08-30 RX ADMIN — TRAMADOL HYDROCHLORIDE 50 MG: 50 TABLET, FILM COATED ORAL at 10:17

## 2019-08-30 RX ADMIN — MONTELUKAST 10 MG: 10 TABLET, FILM COATED ORAL at 00:00

## 2019-08-30 RX ADMIN — FLUTICASONE PROPIONATE 1 SPRAY: 50 SPRAY, METERED NASAL at 10:09

## 2019-08-30 RX ADMIN — TRAMADOL HYDROCHLORIDE 50 MG: 50 TABLET, FILM COATED ORAL at 01:47

## 2019-08-30 RX ADMIN — FERROUS SULFATE TAB 325 MG (65 MG ELEMENTAL FE) 325 MG: 325 (65 FE) TAB at 10:08

## 2019-08-30 RX ADMIN — GABAPENTIN 100 MG: 100 CAPSULE ORAL at 14:22

## 2019-08-30 RX ADMIN — MONTELUKAST 10 MG: 10 TABLET, FILM COATED ORAL at 20:03

## 2019-08-30 RX ADMIN — FUROSEMIDE 20 MG: 20 TABLET ORAL at 10:08

## 2019-08-30 RX ADMIN — WATER 1 G: 1 INJECTION INTRAMUSCULAR; INTRAVENOUS; SUBCUTANEOUS at 06:30

## 2019-08-30 RX ADMIN — GABAPENTIN 100 MG: 100 CAPSULE ORAL at 10:08

## 2019-08-30 RX ADMIN — Medication 10 ML: at 10:10

## 2019-08-30 RX ADMIN — ENOXAPARIN SODIUM 40 MG: 40 INJECTION SUBCUTANEOUS at 10:09

## 2019-08-30 RX ADMIN — VANCOMYCIN HYDROCHLORIDE 1000 MG: 1 INJECTION, POWDER, LYOPHILIZED, FOR SOLUTION INTRAVENOUS at 10:11

## 2019-08-30 RX ADMIN — WATER 1 G: 1 INJECTION INTRAMUSCULAR; INTRAVENOUS; SUBCUTANEOUS at 14:21

## 2019-08-30 ASSESSMENT — PAIN DESCRIPTION - PAIN TYPE
TYPE: ACUTE PAIN
TYPE: CHRONIC PAIN

## 2019-08-30 ASSESSMENT — PAIN DESCRIPTION - FREQUENCY
FREQUENCY: CONTINUOUS
FREQUENCY: INTERMITTENT

## 2019-08-30 ASSESSMENT — PAIN DESCRIPTION - ONSET
ONSET: GRADUAL
ONSET: ON-GOING

## 2019-08-30 ASSESSMENT — PAIN DESCRIPTION - ORIENTATION: ORIENTATION: RIGHT;LEFT

## 2019-08-30 ASSESSMENT — PAIN SCALES - GENERAL
PAINLEVEL_OUTOF10: 8
PAINLEVEL_OUTOF10: 1
PAINLEVEL_OUTOF10: 0
PAINLEVEL_OUTOF10: 5
PAINLEVEL_OUTOF10: 8
PAINLEVEL_OUTOF10: 2
PAINLEVEL_OUTOF10: 8
PAINLEVEL_OUTOF10: 8

## 2019-08-30 ASSESSMENT — PAIN DESCRIPTION - DESCRIPTORS
DESCRIPTORS: HEADACHE
DESCRIPTORS: CRAMPING;CONSTANT

## 2019-08-30 ASSESSMENT — PAIN DESCRIPTION - LOCATION
LOCATION: HEAD
LOCATION: NECK

## 2019-08-30 ASSESSMENT — PAIN DESCRIPTION - PROGRESSION
CLINICAL_PROGRESSION: NOT CHANGED
CLINICAL_PROGRESSION: GRADUALLY WORSENING

## 2019-08-30 ASSESSMENT — PAIN - FUNCTIONAL ASSESSMENT
PAIN_FUNCTIONAL_ASSESSMENT: PREVENTS OR INTERFERES SOME ACTIVE ACTIVITIES AND ADLS
PAIN_FUNCTIONAL_ASSESSMENT: INTOLERABLE, UNABLE TO DO ANY ACTIVE OR PASSIVE ACTIVITIES

## 2019-08-30 NOTE — PLAN OF CARE
Problem: Skin Integrity/Risk  Goal: No skin breakdown during hospitalization  Outcome: Not Met This Shift     Problem: Skin Integrity/Risk  Goal: Wound healing  Outcome: Not Met This Shift     Problem: Musculor/Skeletal Functional Status  Goal: Highest potential functional level  Outcome: Not Met This Shift     Problem: Musculor/Skeletal Functional Status  Goal: Absence of falls  Outcome: Met This Shift

## 2019-08-30 NOTE — FLOWSHEET NOTE
Patient comes to clinic for follow up anticoagulation visit.   Last INR 2/18 was 2.2.  Dose maintained per protocol.   Today's INR is 2.0 and is within goal range.    Current warfarin dosing verified with patient. Patient was informed that their INR result is within therapeutic range and instructed to maintain current dose per protocol. Discussed dose and return date for next INR in approx 4 weeks.  Denies any changes in diet or medications.  Denies any unusual bleeding or bruising.  States missed dose x 1. See AAC flowsheet. Pt ambulated to office without assistive device. [facility]    Dr. Lebron is in the office today supervising the treatment.    Patient was instructed to contact the clinic with any unusual bleeding or bruising, any changes in medications, diet, health status, lifestyle, or any other changes, questions or concerns. Patient verbalized understanding of all discussed.      Inpatient Wound Care    Admit Date: 8/29/2019  5:24 PM    Reason for consult:  Left foot 3rd toe plantar foot    Significant history:    Past Medical History:   Diagnosis Date    Arthritis     rheumatoid    Diabetes     borderline; A1C 6.3    Heart murmur     History of blood transfusion     Leg edema     Osteoarthritis     Rheumatoid arthritis (HCC)     Torticollis        Wound history:      Findings:       08/30/19 1342   Wound 08/13/19 Toe (Comment  which one) Left;Dorsal #3 acquired 8-5-19 3rd toe   Date First Assessed/Time First Assessed: 08/13/19 1556   Present on Hospital Admission: Yes  Primary Wound Type: Pressure Injury  Location: (c) Toe (Comment  which one)  Wound Location Orientation: Left;Dorsal  Wound Description (Comments): #3 acquire. ..    Dressing Changed Changed/New   Wound Length (cm) 1.5 cm   Wound Width (cm) 1.5 cm   Wound Depth (cm) 0.2 cm   Wound Surface Area (cm^2) 2.25 cm^2   Change in Wound Size % (l*w) -177.78   Wound Volume (cm^3) 0.45 cm^3   Wound Healing % -181   Wound Assessment Bleeding;Brown;Yellow   Drainage Amount Small   Drainage Description Serosanguinous   Odor None   Wound 07/30/19 Foot Plantar;Left #2 new left foot plantar aquired 7/23/19   Date First Assessed/Time First Assessed: 07/30/19 1558   Present on Hospital Admission: Yes  Location: Foot  Wound Location Orientation: Plantar;Left  Wound Description (Comments): #2 new left foot plantar aquired 7/23/19   Wound Length (cm) 0.3 cm   Wound Width (cm) 0.3 cm   Wound Surface Area (cm^2) 0.09 cm^2   Change in Wound Size % (l*w) 83.33   Wound Assessment Dry   Drainage Amount None   Odor None     Buttocks has a blister area and a sm purplish area  Impression:      Interventions in place:  adaptic    Plan:Dr Biju nolan for orders  mepilex dressing      Daron Shane 8/30/2019 1:46 PM

## 2019-08-30 NOTE — H&P
Histories  Past Medical History:   Diagnosis Date    Arthritis     rheumatoid    Diabetes     borderline; A1C 6.3    Heart murmur     History of blood transfusion     Leg edema     Osteoarthritis      Past Surgical History:   Procedure Laterality Date    ANKLE FRACTURE SURGERY  2005    L. ankle ORIF    CARPAL TUNNEL RELEASE  2003    R. wrist    COLONOSCOPY  2001    JOINT REPLACEMENT      bilateral knees 2010 and 2011    KNEE SURGERY  06/09/2010    R. total knee arthroplasty    FL ADJ TISS XFER HEAD,FAC,HAND <10SQCM Left 10/19/2018    AMPUTATION LEFT FOURTH TOE. performed by Claudene Simons, DPM at [address] [place] [address],[address] TOE AMPUTATION Left     left fourth toe     TONSILLECTOMY  1966     History reviewed. No pertinent family history. Home Medications  Prior to Admission medications    Medication Sig Start Date End Date Taking? Authorizing Provider   traMADol (ULTRAM) 50 MG tablet Take 50 mg by mouth every 6 hours as needed for Pain.     Historical Provider, MD   gentian violet 1 % topical solution Apply 1 mL topically daily for 14 days Apply small amount to right and left foot webspaces daily for 2 weeks 8/27/19 9/10/19  Lola Galindo DPM   doxycycline hyclate (VIBRAMYCIN) 100 MG capsule Take 100 mg by mouth 2 times daily    Historical Provider, MD   furosemide (LASIX) 20 MG tablet Take 1 tablet by mouth daily 7/26/19   Juliane Males, APRN - CNP   cetirizine (ZYRTEC ALLERGY) 10 MG tablet Take 1 tablet by mouth daily 5/17/19   Juliane Males, APRN - CNP   tiZANidine (ZANAFLEX) 2 MG capsule Take 1 capsule by mouth 3 times daily as needed for Muscle spasms 4/18/19   Juliane Males, APRN - CNP   montelukast (SINGULAIR) 10 MG tablet Take 1 tablet by mouth nightly 3/1/19   Juliane Males, APRN - CNP   Ferrous Sulfate (IRON) 325 (65 Fe) MG TABS Take 325 mg by mouth daily 3/1/19   Juliane Males, APRN - CNP   fluticasone (FLONASE) 50 MCG/ACT nasal spray 1 spray by Nasal route daily    Historical Provider, MD normal. Mid and lower lungs clear. Normal caliber pulmonary vessels. Limited exam. No acute finding. Us Dup Lower Extremity Left Rafi    Result Date: 8/29/2019  Location:200 Exam: US DUP LOWER EXTREMITY LEFT RAFI Comparison: None. History: Pain Findings: Grayscale, Duplex doppler, color-flow, and spectral analysis sonography of the lower extremity deep venous system obtained from the level of the common femoral vein to the calf. Unremarkable compressibility and flow noted throughout. No evidence of echogenic intraluminal filling defect. No sonographic evidence of left lower extremity deep venous thrombosis. Assessment and Plan  Patient is a 62 y.o. female who presented with weakness, fatigue, foot wounds. The active problem list is as follows:    · Bilateral lower extremity cellulitis with dry skin along with bilateral heel wounds  · Inability to ambulate  · Progressive neck pain and cervical deformity with torticollis  · Rheumatoid arthritis    -Continue vancomycin and ancef from the ER  -Sed rate, CRP, ASO  -Wound care  -CK pending  -PT/OT/Social work possible need for placement, had bedbugs on presentation to ER    · Routine labs in the morning. · DVT prophylaxis. · Please see orders for further management and care. The plan to be discussed with Dr. Torito Jason. Dylan Tam DO, PGYIII  10:23 PM  8/29/2019       I discussed the patient's management with the resident. I reviewed the resident's note and agree with the documented findings and plan of care.     Liss Santillan D.O., JE  10:30 PM  8/29/2019

## 2019-08-31 PROBLEM — E44.0 MODERATE PROTEIN-CALORIE MALNUTRITION (HCC): Chronic | Status: ACTIVE | Noted: 2019-08-31

## 2019-08-31 LAB
ALBUMIN SERPL-MCNC: 2.8 G/DL (ref 3.5–5.2)
ALP BLD-CCNC: 81 U/L (ref 35–104)
ALT SERPL-CCNC: 7 U/L (ref 0–32)
ANION GAP SERPL CALCULATED.3IONS-SCNC: 8 MMOL/L (ref 7–16)
AST SERPL-CCNC: 17 U/L (ref 0–31)
BILIRUB SERPL-MCNC: <0.2 MG/DL (ref 0–1.2)
BUN BLDV-MCNC: 17 MG/DL (ref 8–23)
CALCIUM SERPL-MCNC: 8.4 MG/DL (ref 8.6–10.2)
CHLORIDE BLD-SCNC: 97 MMOL/L (ref 98–107)
CO2: 28 MMOL/L (ref 22–29)
CREAT SERPL-MCNC: 0.8 MG/DL (ref 0.5–1)
GFR AFRICAN AMERICAN: >60
GFR NON-AFRICAN AMERICAN: >60 ML/MIN/1.73
GLUCOSE BLD-MCNC: 100 MG/DL (ref 74–99)
HCT VFR BLD CALC: 31.6 % (ref 34–48)
HEMOGLOBIN: 9.9 G/DL (ref 11.5–15.5)
MCH RBC QN AUTO: 29.6 PG (ref 26–35)
MCHC RBC AUTO-ENTMCNC: 31.3 % (ref 32–34.5)
MCV RBC AUTO: 94.3 FL (ref 80–99.9)
PDW BLD-RTO: 13.2 FL (ref 11.5–15)
PLATELET # BLD: 222 E9/L (ref 130–450)
PMV BLD AUTO: 10.8 FL (ref 7–12)
POTASSIUM SERPL-SCNC: 3.8 MMOL/L (ref 3.5–5)
RBC # BLD: 3.35 E12/L (ref 3.5–5.5)
SODIUM BLD-SCNC: 133 MMOL/L (ref 132–146)
TOTAL PROTEIN: 6.8 G/DL (ref 6.4–8.3)
WBC # BLD: 6.7 E9/L (ref 4.5–11.5)

## 2019-08-31 PROCEDURE — 36415 COLL VENOUS BLD VENIPUNCTURE: CPT

## 2019-08-31 PROCEDURE — 6370000000 HC RX 637 (ALT 250 FOR IP): Performed by: INTERNAL MEDICINE

## 2019-08-31 PROCEDURE — 6360000002 HC RX W HCPCS: Performed by: INTERNAL MEDICINE

## 2019-08-31 PROCEDURE — 85027 COMPLETE CBC AUTOMATED: CPT

## 2019-08-31 PROCEDURE — 2580000003 HC RX 258: Performed by: INTERNAL MEDICINE

## 2019-08-31 PROCEDURE — 1200000000 HC SEMI PRIVATE

## 2019-08-31 PROCEDURE — 80053 COMPREHEN METABOLIC PANEL: CPT

## 2019-08-31 RX ADMIN — Medication 10 ML: at 10:31

## 2019-08-31 RX ADMIN — Medication 10 ML: at 21:25

## 2019-08-31 RX ADMIN — WATER 1 G: 1 INJECTION INTRAMUSCULAR; INTRAVENOUS; SUBCUTANEOUS at 18:01

## 2019-08-31 RX ADMIN — VANCOMYCIN HYDROCHLORIDE 750 MG: 5 INJECTION, POWDER, LYOPHILIZED, FOR SOLUTION INTRAVENOUS at 13:00

## 2019-08-31 RX ADMIN — FERROUS SULFATE TAB 325 MG (65 MG ELEMENTAL FE) 325 MG: 325 (65 FE) TAB at 10:30

## 2019-08-31 RX ADMIN — TRAMADOL HYDROCHLORIDE 50 MG: 50 TABLET, FILM COATED ORAL at 18:37

## 2019-08-31 RX ADMIN — VANCOMYCIN HYDROCHLORIDE 750 MG: 5 INJECTION, POWDER, LYOPHILIZED, FOR SOLUTION INTRAVENOUS at 01:04

## 2019-08-31 RX ADMIN — ENOXAPARIN SODIUM 40 MG: 40 INJECTION SUBCUTANEOUS at 10:31

## 2019-08-31 RX ADMIN — WATER 1 G: 1 INJECTION INTRAMUSCULAR; INTRAVENOUS; SUBCUTANEOUS at 10:31

## 2019-08-31 RX ADMIN — TRAMADOL HYDROCHLORIDE 50 MG: 50 TABLET, FILM COATED ORAL at 10:30

## 2019-08-31 RX ADMIN — GABAPENTIN 100 MG: 100 CAPSULE ORAL at 21:25

## 2019-08-31 RX ADMIN — Medication 10 ML: at 01:05

## 2019-08-31 RX ADMIN — FLUTICASONE PROPIONATE 1 SPRAY: 50 SPRAY, METERED NASAL at 10:33

## 2019-08-31 RX ADMIN — GABAPENTIN 100 MG: 100 CAPSULE ORAL at 10:31

## 2019-08-31 RX ADMIN — CETIRIZINE HYDROCHLORIDE 10 MG: 10 TABLET, FILM COATED ORAL at 10:30

## 2019-08-31 RX ADMIN — GABAPENTIN 100 MG: 100 CAPSULE ORAL at 13:55

## 2019-08-31 RX ADMIN — FUROSEMIDE 20 MG: 20 TABLET ORAL at 10:30

## 2019-08-31 RX ADMIN — WATER 1 G: 1 INJECTION INTRAMUSCULAR; INTRAVENOUS; SUBCUTANEOUS at 01:03

## 2019-08-31 RX ADMIN — TRAMADOL HYDROCHLORIDE 50 MG: 50 TABLET, FILM COATED ORAL at 00:26

## 2019-08-31 RX ADMIN — MONTELUKAST 10 MG: 10 TABLET, FILM COATED ORAL at 21:25

## 2019-08-31 ASSESSMENT — PAIN DESCRIPTION - PAIN TYPE
TYPE: ACUTE PAIN
TYPE: CHRONIC PAIN
TYPE: ACUTE PAIN

## 2019-08-31 ASSESSMENT — PAIN DESCRIPTION - LOCATION
LOCATION: NECK
LOCATION: BACK
LOCATION: HEAD

## 2019-08-31 ASSESSMENT — PAIN DESCRIPTION - PROGRESSION: CLINICAL_PROGRESSION: GRADUALLY WORSENING

## 2019-08-31 ASSESSMENT — PAIN DESCRIPTION - ORIENTATION: ORIENTATION: RIGHT;LEFT

## 2019-08-31 ASSESSMENT — PAIN DESCRIPTION - DESCRIPTORS
DESCRIPTORS: ACHING;DISCOMFORT;DULL
DESCRIPTORS: ACHING;DISCOMFORT;SORE

## 2019-08-31 ASSESSMENT — PAIN DESCRIPTION - ONSET: ONSET: ON-GOING

## 2019-08-31 ASSESSMENT — PAIN - FUNCTIONAL ASSESSMENT: PAIN_FUNCTIONAL_ASSESSMENT: PREVENTS OR INTERFERES SOME ACTIVE ACTIVITIES AND ADLS

## 2019-08-31 ASSESSMENT — PAIN SCALES - GENERAL
PAINLEVEL_OUTOF10: 0
PAINLEVEL_OUTOF10: 7
PAINLEVEL_OUTOF10: 8
PAINLEVEL_OUTOF10: 1
PAINLEVEL_OUTOF10: 7
PAINLEVEL_OUTOF10: 8

## 2019-08-31 ASSESSMENT — PAIN DESCRIPTION - FREQUENCY: FREQUENCY: CONTINUOUS

## 2019-09-01 LAB
ALBUMIN SERPL-MCNC: 2.7 G/DL (ref 3.5–5.2)
ALP BLD-CCNC: 89 U/L (ref 35–104)
ALT SERPL-CCNC: <5 U/L (ref 0–32)
ANION GAP SERPL CALCULATED.3IONS-SCNC: 9 MMOL/L (ref 7–16)
AST SERPL-CCNC: 14 U/L (ref 0–31)
BILIRUB SERPL-MCNC: <0.2 MG/DL (ref 0–1.2)
BUN BLDV-MCNC: 21 MG/DL (ref 8–23)
CALCIUM SERPL-MCNC: 8.3 MG/DL (ref 8.6–10.2)
CHLORIDE BLD-SCNC: 95 MMOL/L (ref 98–107)
CO2: 30 MMOL/L (ref 22–29)
CREAT SERPL-MCNC: 0.9 MG/DL (ref 0.5–1)
GFR AFRICAN AMERICAN: >60
GFR NON-AFRICAN AMERICAN: >60 ML/MIN/1.73
GLUCOSE BLD-MCNC: 104 MG/DL (ref 74–99)
HCT VFR BLD CALC: 30.9 % (ref 34–48)
HEMOGLOBIN: 9.7 G/DL (ref 11.5–15.5)
MCH RBC QN AUTO: 29.3 PG (ref 26–35)
MCHC RBC AUTO-ENTMCNC: 31.4 % (ref 32–34.5)
MCV RBC AUTO: 93.4 FL (ref 80–99.9)
PDW BLD-RTO: 13.1 FL (ref 11.5–15)
PLATELET # BLD: 233 E9/L (ref 130–450)
PMV BLD AUTO: 10.6 FL (ref 7–12)
POTASSIUM SERPL-SCNC: 3.9 MMOL/L (ref 3.5–5)
RBC # BLD: 3.31 E12/L (ref 3.5–5.5)
SODIUM BLD-SCNC: 134 MMOL/L (ref 132–146)
TOTAL PROTEIN: 6.2 G/DL (ref 6.4–8.3)
URINE CULTURE, ROUTINE: NORMAL
WBC # BLD: 5.4 E9/L (ref 4.5–11.5)

## 2019-09-01 PROCEDURE — 80053 COMPREHEN METABOLIC PANEL: CPT

## 2019-09-01 PROCEDURE — 1200000000 HC SEMI PRIVATE

## 2019-09-01 PROCEDURE — 97110 THERAPEUTIC EXERCISES: CPT | Performed by: PHYSICAL THERAPIST

## 2019-09-01 PROCEDURE — 97530 THERAPEUTIC ACTIVITIES: CPT | Performed by: PHYSICAL THERAPIST

## 2019-09-01 PROCEDURE — 6370000000 HC RX 637 (ALT 250 FOR IP): Performed by: INTERNAL MEDICINE

## 2019-09-01 PROCEDURE — 85027 COMPLETE CBC AUTOMATED: CPT

## 2019-09-01 PROCEDURE — 2580000003 HC RX 258: Performed by: INTERNAL MEDICINE

## 2019-09-01 PROCEDURE — 36415 COLL VENOUS BLD VENIPUNCTURE: CPT

## 2019-09-01 PROCEDURE — 6360000002 HC RX W HCPCS: Performed by: INTERNAL MEDICINE

## 2019-09-01 RX ORDER — DOXYCYCLINE HYCLATE 100 MG/1
100 CAPSULE ORAL EVERY 12 HOURS SCHEDULED
Status: DISCONTINUED | OUTPATIENT
Start: 2019-09-01 | End: 2019-09-04 | Stop reason: HOSPADM

## 2019-09-01 RX ADMIN — TRAMADOL HYDROCHLORIDE 50 MG: 50 TABLET, FILM COATED ORAL at 00:33

## 2019-09-01 RX ADMIN — FLUTICASONE PROPIONATE 1 SPRAY: 50 SPRAY, METERED NASAL at 10:03

## 2019-09-01 RX ADMIN — GABAPENTIN 100 MG: 100 CAPSULE ORAL at 09:30

## 2019-09-01 RX ADMIN — TRAMADOL HYDROCHLORIDE 50 MG: 50 TABLET, FILM COATED ORAL at 13:39

## 2019-09-01 RX ADMIN — Medication 10 ML: at 10:07

## 2019-09-01 RX ADMIN — FUROSEMIDE 20 MG: 20 TABLET ORAL at 10:04

## 2019-09-01 RX ADMIN — VANCOMYCIN HYDROCHLORIDE 750 MG: 5 INJECTION, POWDER, LYOPHILIZED, FOR SOLUTION INTRAVENOUS at 00:03

## 2019-09-01 RX ADMIN — GABAPENTIN 100 MG: 100 CAPSULE ORAL at 13:44

## 2019-09-01 RX ADMIN — MONTELUKAST 10 MG: 10 TABLET, FILM COATED ORAL at 19:58

## 2019-09-01 RX ADMIN — WATER 1 G: 1 INJECTION INTRAMUSCULAR; INTRAVENOUS; SUBCUTANEOUS at 00:02

## 2019-09-01 RX ADMIN — Medication 10 ML: at 19:58

## 2019-09-01 RX ADMIN — TRAMADOL HYDROCHLORIDE 50 MG: 50 TABLET, FILM COATED ORAL at 19:58

## 2019-09-01 RX ADMIN — GABAPENTIN 100 MG: 100 CAPSULE ORAL at 19:58

## 2019-09-01 RX ADMIN — FERROUS SULFATE TAB 325 MG (65 MG ELEMENTAL FE) 325 MG: 325 (65 FE) TAB at 10:04

## 2019-09-01 RX ADMIN — DOXYCYCLINE HYCLATE 100 MG: 100 CAPSULE ORAL at 10:03

## 2019-09-01 RX ADMIN — DOXYCYCLINE HYCLATE 100 MG: 100 CAPSULE ORAL at 21:03

## 2019-09-01 RX ADMIN — ENOXAPARIN SODIUM 40 MG: 40 INJECTION SUBCUTANEOUS at 10:03

## 2019-09-01 RX ADMIN — CETIRIZINE HYDROCHLORIDE 10 MG: 10 TABLET, FILM COATED ORAL at 10:05

## 2019-09-01 ASSESSMENT — PAIN SCALES - GENERAL
PAINLEVEL_OUTOF10: 7
PAINLEVEL_OUTOF10: 8
PAINLEVEL_OUTOF10: 9
PAINLEVEL_OUTOF10: 0
PAINLEVEL_OUTOF10: 0
PAINLEVEL_OUTOF10: 8

## 2019-09-01 ASSESSMENT — PAIN DESCRIPTION - ORIENTATION
ORIENTATION: RIGHT;LEFT
ORIENTATION: POSTERIOR

## 2019-09-01 ASSESSMENT — PAIN DESCRIPTION - FREQUENCY: FREQUENCY: CONTINUOUS

## 2019-09-01 ASSESSMENT — PAIN DESCRIPTION - ONSET: ONSET: ON-GOING

## 2019-09-01 ASSESSMENT — PAIN DESCRIPTION - LOCATION
LOCATION: NECK
LOCATION: NECK

## 2019-09-01 ASSESSMENT — PAIN DESCRIPTION - PAIN TYPE
TYPE: ACUTE PAIN
TYPE: CHRONIC PAIN

## 2019-09-01 ASSESSMENT — PAIN - FUNCTIONAL ASSESSMENT: PAIN_FUNCTIONAL_ASSESSMENT: PREVENTS OR INTERFERES WITH MANY ACTIVE NOT PASSIVE ACTIVITIES

## 2019-09-01 ASSESSMENT — PAIN DESCRIPTION - PROGRESSION: CLINICAL_PROGRESSION: GRADUALLY WORSENING

## 2019-09-01 ASSESSMENT — PAIN DESCRIPTION - DESCRIPTORS: DESCRIPTORS: ACHING;DISCOMFORT;DULL

## 2019-09-01 NOTE — PLAN OF CARE
Problem: Skin Integrity/Risk  Goal: No skin breakdown during hospitalization  9/1/2019 1008 by Magdalena Liu RN  Outcome: Met This Shift  Note:   Patient will no other skin breakdown      Problem: Skin Integrity/Risk  Goal: Wound healing  9/1/2019 1008 by Magdalena Liu RN  Outcome: Met This Shift  Note:   Dressing change to lt foot daily     Problem: Musculor/Skeletal Functional Status  Goal: Highest potential functional level  9/1/2019 1008 by Magdalena Liu RN  Outcome: Met This Shift     Problem: Musculor/Skeletal Functional Status  Goal: Absence of falls  9/1/2019 1008 by Magdalena Liu RN  Outcome: Met This Shift  Note:   No falls when up     Problem: Musculor/Skeletal Functional Status  Goal: Absence of falls  9/1/2019 1008 by Magdalena Liu RN  Outcome: Met This Shift  Note:   No falls when up     Problem: Pain:  Goal: Pain level will decrease  Description  Pain level will decrease  9/1/2019 1008 by Magdalena Liu RN  Outcome: Met This Shift  Note:   Pain will be less than 3 after being medicated     Problem: Pain:  Goal: Control of chronic pain  Description  Control of chronic pain  9/1/2019 1008 by Magdalena Liu RN  Outcome: Met This Shift     Problem: Nutrition  Goal: Optimal nutrition therapy  9/1/2019 1008 by Magdalena Liu RN  Outcome: Met This Shift

## 2019-09-01 NOTE — PLAN OF CARE
Problem: Skin Integrity/Risk  Goal: No skin breakdown during hospitalization  8/31/2019 2244 by Lb Emery RN  Outcome: Met This Shift     Problem: Skin Integrity/Risk  Goal: Wound healing  Outcome: Met This Shift     Problem: Musculor/Skeletal Functional Status  Goal: Highest potential functional level  8/31/2019 2244 by Lb Emery RN  Outcome: Met This Shift     Problem: Musculor/Skeletal Functional Status  Goal: Absence of falls  8/31/2019 2244 by Lb Emery RN  Outcome: Met This Shift     Problem: Nutrition  Goal: Optimal nutrition therapy  8/31/2019 2244 by Lb Emery RN  Outcome: Met This Shift

## 2019-09-02 DIAGNOSIS — M43.6 TORTICOLLIS: Primary | ICD-10-CM

## 2019-09-02 DIAGNOSIS — M43.8X2 ROTATIONAL DEFORMITY OF CERVICAL SPINE: ICD-10-CM

## 2019-09-02 LAB
ALBUMIN SERPL-MCNC: 2.9 G/DL (ref 3.5–5.2)
ALP BLD-CCNC: 82 U/L (ref 35–104)
ALT SERPL-CCNC: 5 U/L (ref 0–32)
ANION GAP SERPL CALCULATED.3IONS-SCNC: 7 MMOL/L (ref 7–16)
AST SERPL-CCNC: 14 U/L (ref 0–31)
BILIRUB SERPL-MCNC: <0.2 MG/DL (ref 0–1.2)
BUN BLDV-MCNC: 17 MG/DL (ref 8–23)
CALCIUM SERPL-MCNC: 8.7 MG/DL (ref 8.6–10.2)
CHLORIDE BLD-SCNC: 98 MMOL/L (ref 98–107)
CO2: 30 MMOL/L (ref 22–29)
CREAT SERPL-MCNC: 0.7 MG/DL (ref 0.5–1)
GFR AFRICAN AMERICAN: >60
GFR NON-AFRICAN AMERICAN: >60 ML/MIN/1.73
GLUCOSE BLD-MCNC: 114 MG/DL (ref 74–99)
HCT VFR BLD CALC: 30.9 % (ref 34–48)
HEMOGLOBIN: 9.5 G/DL (ref 11.5–15.5)
MCH RBC QN AUTO: 29.2 PG (ref 26–35)
MCHC RBC AUTO-ENTMCNC: 30.7 % (ref 32–34.5)
MCV RBC AUTO: 95.1 FL (ref 80–99.9)
PDW BLD-RTO: 13.3 FL (ref 11.5–15)
PLATELET # BLD: 200 E9/L (ref 130–450)
PMV BLD AUTO: 10.3 FL (ref 7–12)
POTASSIUM SERPL-SCNC: 4.4 MMOL/L (ref 3.5–5)
RBC # BLD: 3.25 E12/L (ref 3.5–5.5)
SODIUM BLD-SCNC: 135 MMOL/L (ref 132–146)
TOTAL PROTEIN: 6.5 G/DL (ref 6.4–8.3)
WBC # BLD: 5.6 E9/L (ref 4.5–11.5)

## 2019-09-02 PROCEDURE — 80053 COMPREHEN METABOLIC PANEL: CPT

## 2019-09-02 PROCEDURE — 1200000000 HC SEMI PRIVATE

## 2019-09-02 PROCEDURE — 97530 THERAPEUTIC ACTIVITIES: CPT

## 2019-09-02 PROCEDURE — 97110 THERAPEUTIC EXERCISES: CPT

## 2019-09-02 PROCEDURE — 85027 COMPLETE CBC AUTOMATED: CPT

## 2019-09-02 PROCEDURE — 6370000000 HC RX 637 (ALT 250 FOR IP): Performed by: INTERNAL MEDICINE

## 2019-09-02 PROCEDURE — 2580000003 HC RX 258: Performed by: INTERNAL MEDICINE

## 2019-09-02 PROCEDURE — 36415 COLL VENOUS BLD VENIPUNCTURE: CPT

## 2019-09-02 PROCEDURE — 6360000002 HC RX W HCPCS: Performed by: INTERNAL MEDICINE

## 2019-09-02 RX ADMIN — DOXYCYCLINE HYCLATE 100 MG: 100 CAPSULE ORAL at 20:07

## 2019-09-02 RX ADMIN — TRAMADOL HYDROCHLORIDE 50 MG: 50 TABLET, FILM COATED ORAL at 08:19

## 2019-09-02 RX ADMIN — Medication 10 ML: at 08:20

## 2019-09-02 RX ADMIN — Medication 10 ML: at 20:07

## 2019-09-02 RX ADMIN — TRAMADOL HYDROCHLORIDE 50 MG: 50 TABLET, FILM COATED ORAL at 20:07

## 2019-09-02 RX ADMIN — GABAPENTIN 100 MG: 100 CAPSULE ORAL at 20:07

## 2019-09-02 RX ADMIN — GABAPENTIN 100 MG: 100 CAPSULE ORAL at 08:19

## 2019-09-02 RX ADMIN — CETIRIZINE HYDROCHLORIDE 10 MG: 10 TABLET, FILM COATED ORAL at 08:19

## 2019-09-02 RX ADMIN — ACETAMINOPHEN 650 MG: 325 TABLET, FILM COATED ORAL at 22:14

## 2019-09-02 RX ADMIN — FLUTICASONE PROPIONATE 1 SPRAY: 50 SPRAY, METERED NASAL at 08:20

## 2019-09-02 RX ADMIN — FUROSEMIDE 20 MG: 20 TABLET ORAL at 08:20

## 2019-09-02 RX ADMIN — DOXYCYCLINE HYCLATE 100 MG: 100 CAPSULE ORAL at 08:19

## 2019-09-02 RX ADMIN — ENOXAPARIN SODIUM 40 MG: 40 INJECTION SUBCUTANEOUS at 08:20

## 2019-09-02 RX ADMIN — GABAPENTIN 100 MG: 100 CAPSULE ORAL at 13:30

## 2019-09-02 RX ADMIN — Medication 10 ML: at 20:16

## 2019-09-02 RX ADMIN — TRAMADOL HYDROCHLORIDE 50 MG: 50 TABLET, FILM COATED ORAL at 02:01

## 2019-09-02 RX ADMIN — FERROUS SULFATE TAB 325 MG (65 MG ELEMENTAL FE) 325 MG: 325 (65 FE) TAB at 08:19

## 2019-09-02 RX ADMIN — MONTELUKAST 10 MG: 10 TABLET, FILM COATED ORAL at 20:07

## 2019-09-02 ASSESSMENT — PAIN SCALES - GENERAL
PAINLEVEL_OUTOF10: 5
PAINLEVEL_OUTOF10: 5
PAINLEVEL_OUTOF10: 8
PAINLEVEL_OUTOF10: 0
PAINLEVEL_OUTOF10: 0
PAINLEVEL_OUTOF10: 5

## 2019-09-02 ASSESSMENT — PAIN DESCRIPTION - PAIN TYPE
TYPE: CHRONIC PAIN
TYPE: CHRONIC PAIN

## 2019-09-02 ASSESSMENT — PAIN DESCRIPTION - ONSET: ONSET: ON-GOING

## 2019-09-02 ASSESSMENT — PAIN DESCRIPTION - LOCATION
LOCATION: NECK
LOCATION: NECK

## 2019-09-02 ASSESSMENT — PAIN DESCRIPTION - DESCRIPTORS: DESCRIPTORS: ACHING;DULL;DISCOMFORT

## 2019-09-02 ASSESSMENT — PAIN DESCRIPTION - ORIENTATION
ORIENTATION: PROXIMAL
ORIENTATION: PROXIMAL

## 2019-09-02 ASSESSMENT — PAIN DESCRIPTION - FREQUENCY: FREQUENCY: CONTINUOUS

## 2019-09-03 ENCOUNTER — HOSPITAL ENCOUNTER (OUTPATIENT)
Dept: WOUND CARE | Age: 64
Discharge: HOME | End: 2019-09-03
Payer: COMMERCIAL

## 2019-09-03 LAB
ALBUMIN SERPL-MCNC: 2.8 G/DL (ref 3.5–5.2)
ALP BLD-CCNC: 81 U/L (ref 35–104)
ALT SERPL-CCNC: 7 U/L (ref 0–32)
ANION GAP SERPL CALCULATED.3IONS-SCNC: 8 MMOL/L (ref 7–16)
AST SERPL-CCNC: 19 U/L (ref 0–31)
BILIRUB SERPL-MCNC: <0.2 MG/DL (ref 0–1.2)
BLOOD CULTURE, ROUTINE: NORMAL
BUN BLDV-MCNC: 23 MG/DL (ref 8–23)
CALCIUM SERPL-MCNC: 9 MG/DL (ref 8.6–10.2)
CHLORIDE BLD-SCNC: 96 MMOL/L (ref 98–107)
CO2: 30 MMOL/L (ref 22–29)
CREAT SERPL-MCNC: 0.8 MG/DL (ref 0.5–1)
GFR AFRICAN AMERICAN: >60
GFR NON-AFRICAN AMERICAN: >60 ML/MIN/1.73
GLUCOSE BLD-MCNC: 111 MG/DL (ref 74–99)
HCT VFR BLD CALC: 31.3 % (ref 34–48)
HEMOGLOBIN: 9.8 G/DL (ref 11.5–15.5)
MCH RBC QN AUTO: 29.3 PG (ref 26–35)
MCHC RBC AUTO-ENTMCNC: 31.3 % (ref 32–34.5)
MCV RBC AUTO: 93.7 FL (ref 80–99.9)
PDW BLD-RTO: 12.9 FL (ref 11.5–15)
PLATELET # BLD: 194 E9/L (ref 130–450)
PMV BLD AUTO: 10.5 FL (ref 7–12)
POTASSIUM SERPL-SCNC: 4.3 MMOL/L (ref 3.5–5)
RBC # BLD: 3.34 E12/L (ref 3.5–5.5)
SODIUM BLD-SCNC: 134 MMOL/L (ref 132–146)
TOTAL PROTEIN: 7 G/DL (ref 6.4–8.3)
WBC # BLD: 5.3 E9/L (ref 4.5–11.5)

## 2019-09-03 PROCEDURE — 6370000000 HC RX 637 (ALT 250 FOR IP): Performed by: INTERNAL MEDICINE

## 2019-09-03 PROCEDURE — 85027 COMPLETE CBC AUTOMATED: CPT

## 2019-09-03 PROCEDURE — 97110 THERAPEUTIC EXERCISES: CPT

## 2019-09-03 PROCEDURE — 36415 COLL VENOUS BLD VENIPUNCTURE: CPT

## 2019-09-03 PROCEDURE — 97530 THERAPEUTIC ACTIVITIES: CPT

## 2019-09-03 PROCEDURE — 6360000002 HC RX W HCPCS: Performed by: INTERNAL MEDICINE

## 2019-09-03 PROCEDURE — 2580000003 HC RX 258: Performed by: INTERNAL MEDICINE

## 2019-09-03 PROCEDURE — 80053 COMPREHEN METABOLIC PANEL: CPT

## 2019-09-03 PROCEDURE — 1200000000 HC SEMI PRIVATE

## 2019-09-03 RX ORDER — DOXYCYCLINE HYCLATE 100 MG/1
100 CAPSULE ORAL EVERY 12 HOURS SCHEDULED
Qty: 10 CAPSULE | Refills: 0 | Status: SHIPPED | OUTPATIENT
Start: 2019-09-03 | End: 2019-09-08

## 2019-09-03 RX ORDER — TRAMADOL HYDROCHLORIDE 50 MG/1
50 TABLET ORAL EVERY 6 HOURS PRN
Qty: 12 TABLET | Refills: 0 | Status: SHIPPED | OUTPATIENT
Start: 2019-09-03 | End: 2019-09-06

## 2019-09-03 RX ADMIN — MONTELUKAST 10 MG: 10 TABLET, FILM COATED ORAL at 20:58

## 2019-09-03 RX ADMIN — DOXYCYCLINE HYCLATE 100 MG: 100 CAPSULE ORAL at 08:34

## 2019-09-03 RX ADMIN — TIZANIDINE 2 MG: 4 TABLET ORAL at 16:44

## 2019-09-03 RX ADMIN — GABAPENTIN 100 MG: 100 CAPSULE ORAL at 08:34

## 2019-09-03 RX ADMIN — TRAMADOL HYDROCHLORIDE 50 MG: 50 TABLET, FILM COATED ORAL at 04:06

## 2019-09-03 RX ADMIN — GABAPENTIN 100 MG: 100 CAPSULE ORAL at 13:37

## 2019-09-03 RX ADMIN — CETIRIZINE HYDROCHLORIDE 10 MG: 10 TABLET, FILM COATED ORAL at 08:34

## 2019-09-03 RX ADMIN — FUROSEMIDE 20 MG: 20 TABLET ORAL at 08:34

## 2019-09-03 RX ADMIN — ENOXAPARIN SODIUM 40 MG: 40 INJECTION SUBCUTANEOUS at 08:34

## 2019-09-03 RX ADMIN — DOXYCYCLINE HYCLATE 100 MG: 100 CAPSULE ORAL at 20:58

## 2019-09-03 RX ADMIN — Medication 10 ML: at 08:35

## 2019-09-03 RX ADMIN — TRAMADOL HYDROCHLORIDE 50 MG: 50 TABLET, FILM COATED ORAL at 11:56

## 2019-09-03 RX ADMIN — TRAMADOL HYDROCHLORIDE 50 MG: 50 TABLET, FILM COATED ORAL at 21:15

## 2019-09-03 RX ADMIN — FERROUS SULFATE TAB 325 MG (65 MG ELEMENTAL FE) 325 MG: 325 (65 FE) TAB at 08:34

## 2019-09-03 RX ADMIN — FLUTICASONE PROPIONATE 1 SPRAY: 50 SPRAY, METERED NASAL at 08:34

## 2019-09-03 RX ADMIN — GABAPENTIN 100 MG: 100 CAPSULE ORAL at 20:58

## 2019-09-03 ASSESSMENT — PAIN SCALES - GENERAL
PAINLEVEL_OUTOF10: 7
PAINLEVEL_OUTOF10: 8
PAINLEVEL_OUTOF10: 8
PAINLEVEL_OUTOF10: 6
PAINLEVEL_OUTOF10: 0
PAINLEVEL_OUTOF10: 9

## 2019-09-03 NOTE — PLAN OF CARE
Problem: Skin Integrity/Risk  Goal: No skin breakdown during hospitalization  Outcome: Met This Shift     Problem: Musculor/Skeletal Functional Status  Goal: Absence of falls  Outcome: Met This Shift

## 2019-09-03 NOTE — DISCHARGE INSTR - COC
8/30/2019  1:42 PM   Wound Surface Area (cm^2) 2.25 cm^2 8/30/2019  1:42 PM   Change in Wound Size % (l*w) -177.78 8/30/2019  1:42 PM   Wound Volume (cm^3) 0.45 cm^3 8/30/2019  1:42 PM   Wound Healing % -181 8/30/2019  1:42 PM   Post-Procedure Length (cm) 1.5 cm 8/27/2019 11:35 AM   Post-Procedure Width (cm) 1.5 cm 8/27/2019 11:35 AM   Post-Procedure Depth (cm) 0.2 cm 8/27/2019 11:35 AM   Post-Procedure Surface Area (cm^2) 2.25 cm^2 8/27/2019 11:35 AM   Post-Procedure Volume (cm^3) 0.45 cm^3 8/27/2019 11:35 AM   Wound Assessment Bleeding;Brown;Yellow 8/30/2019  1:42 PM   Drainage Amount None 9/3/2019 12:00 AM   Drainage Description Serosanguinous 8/30/2019  1:42 PM   Odor None 8/30/2019  1:42 PM   Kenna-wound Assessment Pink 8/27/2019 11:13 AM   Number of days: 20       Wound 08/30/19 Neck open blister (Active)   Wound Other 8/30/2019  3:52 PM   Dressing Status Clean;Dry; Intact 9/3/2019  8:00 AM   Dressing Changed Changed/New 8/31/2019  8:43 AM   Dressing/Treatment Foam 8/31/2019  4:00 PM   Wound Cleansed Rinsed/Irrigated with saline 8/31/2019  8:43 AM   Wound Length (cm) 0.5 cm 8/30/2019  3:52 PM   Wound Width (cm) 0.5 cm 8/30/2019  3:52 PM   Wound Surface Area (cm^2) 0.25 cm^2 8/30/2019  3:52 PM   Drainage Amount None 8/31/2019  4:00 PM   Odor None 8/30/2019  3:52 PM   Number of days: 3        Elimination:  Continence:   · Bowel: Yes  · Bladder: Yes  Urinary Catheter: Insertion Date: ***   Colostomy/Ileostomy/Ileal Conduit: No       Date of Last BM: 09/4/2019    Intake/Output Summary (Last 24 hours) at 9/3/2019 1425  Last data filed at 9/3/2019 1253  Gross per 24 hour   Intake 240 ml   Output 2100 ml   Net -1860 ml     I/O last 3 completed shifts: In: 660 [P.O.:660]  Out: [id number] Carlos Calabrese [Urine:1875]    Safety Concerns:      At Risk for Falls    Impairments/Disabilities:      Contractures - neck    Nutrition Therapy:  Current Nutrition Therapy:   [address] Diet List:[id number]}    Routes of Feeding: {[facility] DME Other

## 2019-09-03 NOTE — CONSULTS
children: Not on file    Years of education: Not on file    Highest education level: Not on file   Occupational History    Not on file   Social Needs    Financial resource strain: Somewhat hard    Food insecurity:     Worry: Never true     Inability: Never true    Transportation needs:     Medical: No     Non-medical: No   Tobacco Use    Smoking status: Never Smoker    Smokeless tobacco: Never Used   Substance and Sexual Activity    Alcohol use: No    Drug use: No    Sexual activity: Yes     Partners: Male   Lifestyle    Physical activity:     Days per week: 0 days     Minutes per session: 0 min    Stress: To some extent   Relationships    Social connections:     Talks on phone: Once a week     Gets together: Once a week     Attends [employer] service: 1 to 4 times per year     Active member of club or organization: No     Attends meetings of clubs or organizations: Never     Relationship status:     Intimate partner violence:     Fear of current or ex partner: Not on file     Emotionally abused: Not on file     Physically abused: Not on file     Forced sexual activity: Not on file   Other Topics Concern    Not on file   Social History Narrative    Not on file     Family History:   History reviewed. No pertinent family history. PHYSICAL EXAM:      Vitals:    BP (!) 116/56   Pulse 90   Temp 98.5 °F (36.9 °C) (Oral)   Resp 16   Ht 5' 4\" (1.626 m)   Wt 142 lb 9.6 oz (64.7 kg)   SpO2 94%   BMI 24.48 kg/m²     Patient seen resting in bed. She is AOx3. NAD. Family present at bedside. VASC: DP/PT pulses palpable. cft brisk. Skin temp warm to warm from proximal to distal. Mild edema noted to right and left foot/leg. NEUROLOGIC: Gross sensation intact. DERM: Full thickness ulceration noted to left 3rd digit with hypergranular tissue. Appears to have decreased in granular tissue since Tuesday. No purulence noted. Erythema noted to right and left leg.        MUSCULOSKELETAL: Muscle strength

## 2019-09-03 NOTE — DISCHARGE SUMMARY
murmurs. Lungs:  Symmetrical. Clear to auscultation bilaterally. No wheezes. No rhonchi. No rales. Abdomen: Soft. Non-tender. Non-distended. Bowel sounds positive. No organomegaly or masses. No pain on palpation    Extremities:  Peripheral pulses present. No peripheral edema. No ulcers. Neurologic:  Alert x 3. No focal deficit. Cranial nerves grossly intact. Skin:  No petechia. No hemorrhage. Lower extremity cellulitis has improved. DISPOSITION:  The patient's condition is good. At this time the patient is without objective evidence of an acute process requiring continuing hospitalization or inpatient management. They are stable for discharge with outpatient follow-up. I have spoken with the patient and discussed the results of the current hospitalization, in addition to providing specific details for the plan of care and counseling regarding the diagnosis and prognosis. The plan has been discussed in detail and they are aware of the specific conditions for emergent return, as well as the importance of follow-up. Their questions are answered at this time and they are agreeable with the plan for discharge to acute rehab.     DISCHARGE MEDICATIONS:    Tobin Cage   Home Medication Instructions SEN:[id number]    Printed on:09/03/19 6708   Medication Information                      cetirizine (ZYRTEC ALLERGY) 10 MG tablet  Take 1 tablet by mouth daily             doxycycline hyclate (VIBRAMYCIN) 100 MG capsule  Take 1 capsule by mouth every 12 hours for 5 days             Ferrous Sulfate (IRON) 325 (65 Fe) MG TABS  Take 325 mg by mouth daily             fluticasone (FLONASE) 50 MCG/ACT nasal spray  1 spray by Nasal route daily             furosemide (LASIX) 20 MG tablet  Take 1 tablet by mouth daily             gentian violet 1 % topical solution  Apply 1 mL topically daily for 14 days Apply small amount to right and left foot webspaces daily for 2 weeks             montelukast

## 2019-09-04 VITALS
SYSTOLIC BLOOD PRESSURE: 124 MMHG | OXYGEN SATURATION: 93 % | WEIGHT: 142.6 LBS | BODY MASS INDEX: 24.34 KG/M2 | HEIGHT: 64 IN | HEART RATE: 95 BPM | TEMPERATURE: 98.2 F | RESPIRATION RATE: 16 BRPM | DIASTOLIC BLOOD PRESSURE: 59 MMHG

## 2019-09-04 LAB
ALBUMIN SERPL-MCNC: 3.1 G/DL (ref 3.5–5.2)
ALP BLD-CCNC: 80 U/L (ref 35–104)
ALT SERPL-CCNC: 11 U/L (ref 0–32)
ANION GAP SERPL CALCULATED.3IONS-SCNC: 9 MMOL/L (ref 7–16)
AST SERPL-CCNC: 28 U/L (ref 0–31)
BILIRUB SERPL-MCNC: <0.2 MG/DL (ref 0–1.2)
BUN BLDV-MCNC: 25 MG/DL (ref 8–23)
CALCIUM SERPL-MCNC: 9.2 MG/DL (ref 8.6–10.2)
CHLORIDE BLD-SCNC: 97 MMOL/L (ref 98–107)
CO2: 32 MMOL/L (ref 22–29)
CREAT SERPL-MCNC: 0.7 MG/DL (ref 0.5–1)
GFR AFRICAN AMERICAN: >60
GFR NON-AFRICAN AMERICAN: >60 ML/MIN/1.73
GLUCOSE BLD-MCNC: 116 MG/DL (ref 74–99)
HCT VFR BLD CALC: 33.3 % (ref 34–48)
HEMOGLOBIN: 10.5 G/DL (ref 11.5–15.5)
MCH RBC QN AUTO: 29.8 PG (ref 26–35)
MCHC RBC AUTO-ENTMCNC: 31.5 % (ref 32–34.5)
MCV RBC AUTO: 94.6 FL (ref 80–99.9)
PDW BLD-RTO: 13.2 FL (ref 11.5–15)
PLATELET # BLD: 191 E9/L (ref 130–450)
PMV BLD AUTO: 10.5 FL (ref 7–12)
POTASSIUM SERPL-SCNC: 4.2 MMOL/L (ref 3.5–5)
RBC # BLD: 3.52 E12/L (ref 3.5–5.5)
SODIUM BLD-SCNC: 138 MMOL/L (ref 132–146)
TOTAL PROTEIN: 7.1 G/DL (ref 6.4–8.3)
WBC # BLD: 5 E9/L (ref 4.5–11.5)

## 2019-09-04 PROCEDURE — 80053 COMPREHEN METABOLIC PANEL: CPT

## 2019-09-04 PROCEDURE — 36415 COLL VENOUS BLD VENIPUNCTURE: CPT

## 2019-09-04 PROCEDURE — 6360000002 HC RX W HCPCS: Performed by: INTERNAL MEDICINE

## 2019-09-04 PROCEDURE — 97110 THERAPEUTIC EXERCISES: CPT

## 2019-09-04 PROCEDURE — 6370000000 HC RX 637 (ALT 250 FOR IP): Performed by: INTERNAL MEDICINE

## 2019-09-04 PROCEDURE — 97530 THERAPEUTIC ACTIVITIES: CPT

## 2019-09-04 PROCEDURE — 2580000003 HC RX 258: Performed by: INTERNAL MEDICINE

## 2019-09-04 PROCEDURE — 85027 COMPLETE CBC AUTOMATED: CPT

## 2019-09-04 RX ADMIN — TRAMADOL HYDROCHLORIDE 50 MG: 50 TABLET, FILM COATED ORAL at 10:21

## 2019-09-04 RX ADMIN — FERROUS SULFATE TAB 325 MG (65 MG ELEMENTAL FE) 325 MG: 325 (65 FE) TAB at 10:21

## 2019-09-04 RX ADMIN — GABAPENTIN 100 MG: 100 CAPSULE ORAL at 15:42

## 2019-09-04 RX ADMIN — GABAPENTIN 100 MG: 100 CAPSULE ORAL at 10:21

## 2019-09-04 RX ADMIN — FLUTICASONE PROPIONATE 1 SPRAY: 50 SPRAY, METERED NASAL at 10:22

## 2019-09-04 RX ADMIN — Medication 10 ML: at 10:22

## 2019-09-04 RX ADMIN — ENOXAPARIN SODIUM 40 MG: 40 INJECTION SUBCUTANEOUS at 10:21

## 2019-09-04 RX ADMIN — DOXYCYCLINE HYCLATE 100 MG: 100 CAPSULE ORAL at 10:21

## 2019-09-04 RX ADMIN — FUROSEMIDE 20 MG: 20 TABLET ORAL at 10:21

## 2019-09-04 RX ADMIN — CETIRIZINE HYDROCHLORIDE 10 MG: 10 TABLET, FILM COATED ORAL at 10:21

## 2019-09-04 ASSESSMENT — PAIN SCALES - GENERAL: PAINLEVEL_OUTOF10: 6

## 2019-09-04 NOTE — PROGRESS NOTES
Internal Medicine Progress Note    Colt Grace. Alexandra Santana., & [address] [facility] [address] Alexandra Alvarenga, F.A.C.O.I. Jackelin Evans D.O., ANYA.C.O.I. Primary Care Physician: YOKO Rushing - CNP   Admitting Physician:  Liss Santillan DO  Admission date and time: 8/29/2019  5:24 PM    Room:  [facility]-    Patient Name: Chago Reid  MRN: [id number]    Date of Service: 8/30/2019     Subjective:  Pantera Sorto presented through [facility] emergency department yesterday for the evaluation of lower extremity cellulitis and inability to ambulate. The patient suffers from severe and end-stage rheumatoid arthritis and has severe physical deformities. She does not follow with a rheumatologist locally and only takes medications for symptomatic control of her disease process. Work-up in the emergency department was relatively benign. She was found to have bedbug infestation. No family members were present during my examination. Review of System: HEENT:  Jonathan ear pain, sore throat, sinus or eye problems  Cardiovascular:   Denies any chest pain, irregular heartbeats, or palpitations. Respiratory:   Denies shortness of breath, coughing, sputum production, hemoptysis, or wheezing. Gastrointestinal:   Denies nausea, vomiting, diarrhea, or constipation. Denies any abdominal pain. Extremities:   Admits to the cellulitic process involving the lower extremity. Neurology:    Denies any headache or focal neurological deficits. Admits to profound weakness and deconditioning. Derm:    Denies any rashes, ulcers, or excoriations. Denies bruising. Genitourinary:    Denies any urgency, frequency, hematuria. Voiding without difficulty. Musculoskeletal:  Admits to diffuse joint pain. Physical Exam:  No intake/output data recorded. Blood pressure 119/66, pulse 84, temperature 99.1 °F (37.3 °C), temperature source Oral, resp.  rate 16, height 5' 4.5\" (1.638 m), weight 128 lb (58.1 kg), SpO2 93 %,
Internal Medicine Progress Note    Honor Humjesica. Nichole Clemente., & [address] [facility] [address] [facility] [address] Nichole Clemente., F.A.C.O.I. Floridalma Saldana D.O., F.A.C.O.I. Primary Care Physician: Tristan John, APRN - CNP   Admitting Physician:  Roberth Rahman DO  Admission date and time: 8/29/2019  5:24 PM    Room:  [address] [place] [address]    Patient Name: Tatyana Busby  MRN: [id number]    Date of Service: 9/4/2019     Subjective:  Hayley Ryan was originally scheduled for discharge yesterday but precertification was not obtained. She continues to work with the therapy teams. Her cellulitic process continues to improve. Her pain is well controlled. Lab work and vital signs are otherwise stable. No family members were present during my examination today. Review of System: HEENT:  Jnoathan ear pain, sore throat, sinus or eye problems  Cardiovascular:   Denies any chest pain, irregular heartbeats, or palpitations. Respiratory:   Denies shortness of breath, coughing, sputum production, hemoptysis, or wheezing. Gastrointestinal:   Denies nausea, vomiting, diarrhea, or constipation. Denies any abdominal pain. Extremities:   Admits to the cellulitic process involving the lower extremity. Neurology:    Denies any headache or focal neurological deficits. Admits to profound weakness and deconditioning. Derm:    Denies any rashes, ulcers, or excoriations. Denies bruising. Genitourinary:    Denies any urgency, frequency, hematuria. Voiding without difficulty. Musculoskeletal:  Admits to diffuse joint pain. Physical Exam:  No intake/output data recorded. Blood pressure 109/64, pulse 106, temperature 99.1 °F (37.3 °C), temperature source Oral, resp. rate 16, height 5' 4\" (1.626 m), weight 142 lb 9.6 oz (64.7 kg), SpO2 95 %, not currently breastfeeding. HEENT:    PERRLA. EOMI. Sclera clear. Buccal mucosa moist.  Neck:    Supple. Profound torticollis  Heart:    Rhythm regular, rate controlled. Systolic murmur.   Lungs:
Internal Medicine Progress Note    Leigha Humphreys. Vickie Soriano., & [address] [facility] [address] [facility] [address] Vickie Sroiano., F.A.C.O.I. Justina York D.O., F.MIKKI.C.O.I. Primary Care Physician: YOKO Hartley - CNP   Admitting Physician:  Mitzi Moe DO  Admission date and time: 8/29/2019  5:24 PM    Room:  [address] [place] [address]    Patient Name: Marii Carrero  MRN: [id number]    Date of Service: 9/1/2019     Subjective:  Jackie Swain continues to improve on a daily basis. She has been evaluated by the neurosurgical team and recommendations have been reviewed. Lab work and vital signs are otherwise stable. She admits to ongoing pain. Review of System: HEENT:  Jonathan ear pain, sore throat, sinus or eye problems  Cardiovascular:   Denies any chest pain, irregular heartbeats, or palpitations. Respiratory:   Denies shortness of breath, coughing, sputum production, hemoptysis, or wheezing. Gastrointestinal:   Denies nausea, vomiting, diarrhea, or constipation. Denies any abdominal pain. Extremities:   Admits to the cellulitic process involving the lower extremity. Neurology:    Denies any headache or focal neurological deficits. Admits to profound weakness and deconditioning. Derm:    Denies any rashes, ulcers, or excoriations. Denies bruising. Genitourinary:    Denies any urgency, frequency, hematuria. Voiding without difficulty. Musculoskeletal:  Admits to diffuse joint pain. Physical Exam:  No intake/output data recorded. Blood pressure (!) 108/52, pulse 99, temperature 98.4 °F (36.9 °C), temperature source Oral, resp. rate 18, height 5' 4\" (1.626 m), weight 141 lb 9.6 oz (64.2 kg), SpO2 99 %, not currently breastfeeding. HEENT:    PERRLA. EOMI. Sclera clear. Buccal mucosa moist.  Neck:    Supple. Profound torticollis  Heart:    Rhythm regular, rate controlled. Systolic murmur. Lungs:    Symmetrical. Clear to auscultation bilaterally. No wheezes. No rhonchi. No rales. Abdomen:   Soft.
Nutrition Assessment    Type and Reason for Visit: Initial, Positive Nutrition Screen    Nutrition Recommendations: Recommend and start Ensure Enlive supplement BID and Magic Cup supplement once daily to help meet increased nutritional needs. Nutrition Assessment: Patient meets criteria for moderate malnutrition AEB weight loss and muscle/fat wasting ; Pt at further nutritional risk d/t increased needs from wound healing; Will start nutritional supplementation     Malnutrition Assessment:  · Malnutrition Status: Meets the criteria for moderate malnutrition  · Context: Chronic illness  · Findings of the 6 clinical characteristics of malnutrition (Minimum of 2 out of 6 clinical characteristics is required to make the diagnosis of moderate or severe Protein Calorie Malnutrition based on AND/ASPEN Guidelines):  1. Energy Intake-Greater than 75% of estimated energy requirement, Greater than or equal to 5 days    2. Weight Loss-20% loss or greater, (10 months )  3. Fat Loss-Mild subcutaneous fat loss, Orbital  4. Muscle Loss-Mild muscle mass loss, Temples (temporalis muscle), Clavicles (pectoralis and deltoids)  5. Fluid Accumulation-No significant fluid accumulation,    6.  Strength-Not measured    Nutrition Risk Level: Moderate    Nutrient Needs:  · Estimated Daily Total Kcal: 0290-1254 (REE 1140 x 1.3 SF)  · Estimated Daily Protein (g): 78-90 (1.3-1.5g/kg CBW)  · Estimated Daily Total Fluid (ml/day): 7412-2491    Nutrition Diagnosis:   · Problem:  Moderate malnutrition, In context of chronic illness  · Etiology: related to Muscular dysfunction     Signs and symptoms:  as evidenced by Mild muscle loss, Mild loss of subcutaneous fat, Weight loss    Objective Information:  · Nutrition-Focused Physical Findings: -I&Os (-1.5 L) ; 1+ edema ; abd inspection WNL ; A&O x 4 ; contractures ; red/isaiah skin ; blister to buttocks ; scaly/flaky skin to BLE ; redness to LLE ; excoriation to toe ; mild muscle and fat
P Quality Flow/Interdisciplinary Rounds Progress Note        Quality Flow Rounds held on September 3, 2019    Disciplines Attending:  Bedside Nurse, ,  and Nursing Unit Leadership    Aidan Lee was admitted on 8/29/2019  5:24 PM    Anticipated Discharge Date:  Expected Discharge Date: 09/01/19    Disposition:    Rodrigue Score:  Rodrigue Scale Score: 15    Readmission Risk              Risk of Unplanned Readmission:        12           Discussed patient goal for the day, patient clinical progression, and barriers to discharge.   The following Goal(s) of the Day/Commitment(s) have been identified:  DC pending precert to [address] [place] [address] 12  September 3, 2019
Physical Therapy    Physical Therapy  Daily Treatment Note  9/2/2019  [id number]                      Reuben Antoine   [id number]                              1955    Patient Active Problem List   Diagnosis    Knee pain    Left knee DJD    Status post total knee replacement    Rheumatoid arthritis of hand ([facility]    Chronic venous hypertension w/ulcer and inflammation involv right side (HCC)    C6 radiculopathy    Rheumatoid arthritis involving vertebra with positive rheumatoid factor (HCC)    Decreased  strength    Non-healing ulcer of foot, left, with fat layer exposed ([address]    Left leg cellulitis    Rotational deformity of cervical spine    Moderate protein-calorie malnutrition ([facility]       Recommendation for discharge: inpatient rehab; will need recliner chair initially d/t rom limitations and knees/hips and trunk; also head rest for contracted cervical area for support  Equipment prescriptions needed: to be determined     Precautions: falls;torticollis to left ; ortho recommends follow up in [place]    S: Patient cleared by nursing for treatment. Patient is agreeable to treatment. Pain status: (measured on a visual analog scale with 0=no pain and 10=excruciating pain) 6/10. O: Pt was instructed in and performed the following:   Bed Mobility- roll: mod a of 2   Supine to sit-Total assistance of 2         Sit to supine-Total assistance pt unable to flex at trunk, hips and knees adequately to safety sit edge of bed therefore returned to supine   Transfers-sit to stand- not assessed     Comment: Patient left with OT for treatment. Call light left by patient. A: Pt motivated to improve mobility and rom. Pt will need multidisciplinary approach to care and seating to promote functional mobility and indep.    P: Continue with physical therapy   Deborra Skains
Physical Therapy  Physical Therapy  Daily Treatment Note  9/4/2019  [id number]                      Omer Swift   [id number]                              1955    Patient Active Problem List   Diagnosis    Knee pain    Left knee DJD    Status post total knee replacement    Rheumatoid arthritis of hand ([facility]    Chronic venous hypertension w/ulcer and inflammation involv right side (HCC)    C6 radiculopathy    Rheumatoid arthritis involving vertebra with positive rheumatoid factor (HCC)    Decreased  strength    Non-healing ulcer of foot, left, with fat layer exposed ([address]    Left leg cellulitis    Rotational deformity of cervical spine    Moderate protein-calorie malnutrition ([facility]       Recommendation for discharge: inpatient rehab; will need recliner chair initially d/t rom limitations and knees/hips and trunk; also head rest for contracted cervical area for support  Equipment prescriptions needed:to be determined    [facility]   How much difficulty turning over in bed?: A Lot  How much difficulty sitting down on / standing up from a chair with arms?: Unable  How much difficulty moving from lying on back to sitting on side of bed?: Unable  How much help from another person moving to and from a bed to a chair?: Total  How much help from another person needed to walk in hospital room?: Total  How much help from another person for climbing 3-5 steps with a railing?: Total  AM-PAC Inpatient Mobility Raw Score : 7  AM-PAC Inpatient T-Scale Score : 26.42  Mobility Inpatient CMS 0-100% Score: 92.36  Mobility Inpatient CMS G-Code Modifier : CM    Precautions: falls, torticollis to left; Ortho recommends follow up in [place]  S: Patient cleared by nursing for treatment. Patient is agreeable to treatment. Pt c/o pain with her neck. Pain status: (measured on a visual analog scale with 0=no pain and 10=excruciating pain) no number given/10.    O: Pt was instructed in and performed the
Room #: 1274/8879-18    PHYSICAL THERAPY INITIAL EVALUATION    Placement recommendation: DIANNA  AM-[facility] Mobility Inpatient   How much difficulty turning over in bed?: A Lot  How much difficulty sitting down on / standing up from a chair with arms?: Unable  How much difficulty moving from lying on back to sitting on side of bed?: A Lot  How much help from another person moving to and from a bed to a chair?: Total  How much help from another person needed to walk in hospital room?: Total  How much help from another person for climbing 3-5 steps with a railing?: Total  AM-PAC Inpatient Mobility Raw Score : 8  AM-PAC Inpatient T-Scale Score : 28.52  Mobility Inpatient CMS 0-100% Score: 86.62  Mobility Inpatient CMS G-Code Modifier : CM    Order:  EVALUATE AND TREAT    Diagnosis/Problem list:   Patient Active Problem List   Diagnosis    Knee pain    Left knee DJD    Status post total knee replacement    Rheumatoid arthritis of hand ([address]    Chronic venous hypertension w/ulcer and inflammation involv right side (HCC)    C6 radiculopathy    Rheumatoid arthritis involving vertebra with positive rheumatoid factor ([address]    Decreased  strength    Non-healing ulcer of foot, left, with fat layer exposed ([address]    Left leg cellulitis       Past medical history:       Diagnosis Date    Arthritis     rheumatoid    Diabetes     borderline; A1C 6.3    Heart murmur     History of blood transfusion     Leg edema     Osteoarthritis     Rheumatoid arthritis ([address]     Torticollis    ;      Procedure Laterality Date    ANKLE FRACTURE SURGERY  2005    L. ankle ORIF    CARPAL TUNNEL RELEASE  2003    R. wrist    COLONOSCOPY  2001    JOINT REPLACEMENT      bilateral knees 2010 and 2011    KNEE SURGERY  06/09/2010    R. total knee arthroplasty    NH ADJ TISS XFER HEAD,FAC,HAND <10SQCM Left 10/19/2018    AMPUTATION LEFT FOURTH TOE. performed by Linda Wyatt DPM at [address]    TOE AMPUTATION Left     left fourth toe    
Soft. Non-tender. Non-distended. Bowel sounds positive. No organomegaly or masses. No pain on palpation  Extremities:    Peripheral pulses present. Findings of end-stage rheumatoid arthritis with joint deviation and overall spasticity. Neurologic:    Alert x 3. No focal deficit. Cranial nerves grossly intact. Skin:    No petechia. No hemorrhage. No wounds. Psych:   Behavior is normal. Mood appears normal. Speech is not rapid or pressured.   Genitalia/Breast:  Deferred    Scheduled Meds:   gabapentin  100 mg Oral TID    vancomycin  750 mg Intravenous Q12H    cetirizine  10 mg Oral Daily    ferrous sulfate  325 mg Oral Daily    fluticasone  1 spray Nasal Daily    furosemide  20 mg Oral Daily    montelukast  10 mg Oral Nightly    sodium chloride flush  10 mL Intravenous 2 times per day    enoxaparin  40 mg Subcutaneous Daily    ceFAZolin  1 g Intravenous Q8H       Objective Data:  CBC with Differential:    Lab Results   Component Value Date    WBC 6.7 08/31/2019    RBC 3.35 08/31/2019    HGB 9.9 08/31/2019    HCT 31.6 08/31/2019     08/31/2019    MCV 94.3 08/31/2019    MCH 29.6 08/31/2019    MCHC 31.3 08/31/2019    RDW 13.2 08/31/2019    LYMPHOPCT 18.5 08/29/2019    MONOPCT 8.7 08/29/2019    BASOPCT 0.8 08/29/2019    MONOSABS 0.51 08/29/2019    LYMPHSABS 1.09 08/29/2019    EOSABS 0.27 08/29/2019    BASOSABS 0.05 08/29/2019     CMP:    Lab Results   Component Value Date     08/31/2019    K 3.8 08/31/2019    K 3.8 08/29/2019    CL 97 08/31/2019    CO2 28 08/31/2019    BUN 17 08/31/2019    CREATININE 0.8 08/31/2019    GFRAA >60 08/31/2019    LABGLOM >60 08/31/2019    GLUCOSE 100 08/31/2019    GLUCOSE 119 07/08/2011    PROT 6.8 08/31/2019    LABALBU 2.8 08/31/2019    LABALBU 3.9 07/08/2011    CALCIUM 8.4 08/31/2019    BILITOT <0.2 08/31/2019    ALKPHOS 81 08/31/2019    AST 17 08/31/2019    ALT 7 08/31/2019       Wound Documentation:   Wound 07/24/14 Venous ulcer Leg Right;Upper new wound, #1
sounds positive. No organomegaly or masses. No pain on palpation  Extremities:    Peripheral pulses present. Findings of end-stage rheumatoid arthritis with joint deviation and overall spasticity. Neurologic:    Alert x 3. No focal deficit. Cranial nerves grossly intact. Skin:    No petechia. No hemorrhage. No wounds. Psych:   Behavior is normal. Mood appears normal. Speech is not rapid or pressured. Genitalia/Breast:  Deferred    Scheduled Meds:   doxycycline hyclate  100 mg Oral 2 times per day    gabapentin  100 mg Oral TID    cetirizine  10 mg Oral Daily    ferrous sulfate  325 mg Oral Daily    fluticasone  1 spray Nasal Daily    furosemide  20 mg Oral Daily    montelukast  10 mg Oral Nightly    sodium chloride flush  10 mL Intravenous 2 times per day    enoxaparin  40 mg Subcutaneous Daily       Objective Data:  CBC with Differential:    Lab Results   Component Value Date    WBC 5.6 09/02/2019    RBC 3.25 09/02/2019    HGB 9.5 09/02/2019    HCT 30.9 09/02/2019     09/02/2019    MCV 95.1 09/02/2019    MCH 29.2 09/02/2019    MCHC 30.7 09/02/2019    RDW 13.3 09/02/2019    LYMPHOPCT 18.5 08/29/2019    MONOPCT 8.7 08/29/2019    BASOPCT 0.8 08/29/2019    MONOSABS 0.51 08/29/2019    LYMPHSABS 1.09 08/29/2019    EOSABS 0.27 08/29/2019    BASOSABS 0.05 08/29/2019     CMP:    Lab Results   Component Value Date     09/02/2019    K 4.4 09/02/2019    K 3.8 08/29/2019    CL 98 09/02/2019    CO2 30 09/02/2019    BUN 17 09/02/2019    CREATININE 0.7 09/02/2019    GFRAA >60 09/02/2019    LABGLOM >60 09/02/2019    GLUCOSE 114 09/02/2019    GLUCOSE 119 07/08/2011    PROT 6.5 09/02/2019    LABALBU 2.9 09/02/2019    LABALBU 3.9 07/08/2011    CALCIUM 8.7 09/02/2019    BILITOT <0.2 09/02/2019    ALKPHOS 82 09/02/2019    AST 14 09/02/2019    ALT 5 09/02/2019       Wound Documentation:   Wound 07/24/14 Venous ulcer Leg Right;Upper new wound, #1 venous.  right upper leg, date of onset may 2014 (Active)

## 2019-09-06 ENCOUNTER — CARE COORDINATION (OUTPATIENT)
Dept: CARE COORDINATION | Age: 64
End: 2019-09-06

## 2019-09-06 NOTE — CARE COORDINATION
7/26/19   YOKO Montes CNP   cetirizine (ZYRTEC ALLERGY) 10 MG tablet Take 1 tablet by mouth daily 5/17/19   YOKO Quintero CNP   tiZANidine (ZANAFLEX) 2 MG capsule Take 1 capsule by mouth 3 times daily as needed for Muscle spasms 4/18/19   YOKO Quintero CNP   montelukast (SINGULAIR) 10 MG tablet Take 1 tablet by mouth nightly 3/1/19   YOKO Quintero CNP   Ferrous Sulfate (IRON) 325 (65 Fe) MG TABS Take 325 mg by mouth daily 3/1/19   YOKO Quintero CNP   fluticasone (FLONASE) 50 MCG/ACT nasal spray 1 spray by Nasal route daily    Historical Provider, MD       No future appointments.

## 2019-09-10 ENCOUNTER — HOSPITAL ENCOUNTER (OUTPATIENT)
Dept: WOUND CARE | Age: 64
Discharge: HOME | End: 2019-09-10
Payer: COMMERCIAL

## 2019-09-11 DIAGNOSIS — M43.8X2 ROTATIONAL DEFORMITY OF CERVICAL SPINE: Primary | ICD-10-CM

## 2019-09-24 ENCOUNTER — HOSPITAL ENCOUNTER (OUTPATIENT)
Dept: MRI IMAGING | Age: 64
Discharge: HOME | End: 2019-09-26
Payer: COMMERCIAL

## 2019-09-24 DIAGNOSIS — M43.8X2 ROTATIONAL DEFORMITY OF CERVICAL SPINE: ICD-10-CM

## 2019-09-30 ENCOUNTER — CARE COORDINATION (OUTPATIENT)
Dept: CARE COORDINATION | Age: 64
End: 2019-09-30

## 2019-09-30 NOTE — CARE COORDINATION
Jaya Plata LPN, Social Coordinator from [facility] [employer]. Stated that they are in the process of making the pt a long term care resident as it has become increasingly difficult for pts family to care for her at home. Will place in permanent exclusion.

## 2019-10-03 NOTE — PROGRESS NOTES
[facility]  History and Physical/Consultation  Podiatry    Referring Physician : YOKO Castellano CNP  Diallo Cosme  MEDICAL RECORD NUMBER:  [id number]  AGE: 62 y.o. GENDER: female  : 1955  EPISODE DATE:  2019  Subjective:     Chief Complaint   Patient presents with    Wound Check     Left Foot Wound Care Follow Up Appt with Dr Tete Shen @ [facility]         HISTORY of PRESENT ILLNESS HPI     Diallo Cosme is a 62 y.o. female who presents today for wound/ulcer evaluation. History of Wound Context:  The patient has had a wound of left 3rd digit and has been following with Dr. Mayra Vinson for treatment. This has been treated with daily dressing changes by her . Patient and family state that the wound on the toe appears to be slightly better.  The patient has had wounds similar to this in the past.          Wound/Ulcer Pain Timing/Severity: intermittent  Quality of pain: aching  Modifying Factors: Pain worsens with palpation  Associated Signs/Symptoms: none    Ulcer Identification:  Ulcer Type: diabetic  Contributing Factors: diabetes    Diabetic/Pressure/Non Pressure Ulcers onl y:  Ulcer: Diabetic ulcer, fat layer exposed    If patient has diabetic lower extremity wounds  Potter Classification of diabetic lower extremity wounds:    Grade Description   []  0 No open wound   []  1 Superficial ulcer involving the full skin thickness   []  2 Deep ulcer involves ligament, tendon, joint capsule, or fascia  No bone involvement or abscess presence   []  3 Deep Ulcer with abcess formation and/or osteomyelitis   []  4 Localized gangrene   []  5 Extensive gangrene of the foot     Wound: N/A        PAST MEDICAL HISTORY      Diagnosis Date    Arthritis     rheumatoid    Diabetes     borderline; A1C 6.3    Heart murmur     History of blood transfusion     Leg edema     Osteoarthritis     Rheumatoid arthritis (HCC)     Torticollis      Past Surgical History:   Procedure Instructions       Visit Discharge/Physician Orders     Discharge condition: Stable     Assessment of pain at discharge: minimal     Anesthetic used: 2% lidocaine gel     Discharge to: Home     Left via:Private automobile     Accompanied by: accompanied by spouse     ECF/HHA:      Dressing Orders:  to left plantar  foot, apply alginate, and dry dressing. COVER WITH DRY DRESSING AND CHANGE EVERY 24 HOURS.     To left 3rd toe wound apply silver alginate and place between toes on each side asto keep dry, dry dressing and secure. Change daily.     Treatment Orders: wound culture reviewed. Take antibiotic as directed. Biopsy taken of 3rd left toe 8/27/19  Xray of left foot ordered 8/27/19     [facility] followup visit ___1 WEEKS Dr Ayaka Garcia 2 week Dr Ivy______________________  (Please note your next appointment above and if you are unable to keep, kindly give a 24 hour notice.  Thank you.)     Physician signature:__________________________        If you experience any of the following, please call the [facility] during business hours:     * Increase in Pain  * Temperature over 101  * Increase in drainage from your wound  * Drainage with a foul odor  * Bleeding  * Increase in swelling  * Need for compression bandage changes due to slippage, breakthrough drainage.     If you need medical attention outside of the business hours of the [facility] please contact your PCP or go to the nearest emergency room.                                            Electronically signed by Lola Galindo DPM on 10/2/2019 at 11:16 PM

## 2019-11-22 ENCOUNTER — HOSPITAL ENCOUNTER (EMERGENCY)
Age: 64
Discharge: HOME | End: 2019-11-23
Attending: EMERGENCY MEDICINE
Payer: COMMERCIAL

## 2019-11-22 ENCOUNTER — APPOINTMENT (OUTPATIENT)
Dept: GENERAL RADIOLOGY | Age: 64
End: 2019-11-22
Payer: COMMERCIAL

## 2019-11-22 DIAGNOSIS — T88.7XXA SIDE EFFECT OF MEDICATION: Primary | ICD-10-CM

## 2019-11-22 DIAGNOSIS — N30.00 ACUTE CYSTITIS WITHOUT HEMATURIA: ICD-10-CM

## 2019-11-22 DIAGNOSIS — R41.82 ALTERED MENTAL STATUS, UNSPECIFIED ALTERED MENTAL STATUS TYPE: ICD-10-CM

## 2019-11-22 LAB
ALBUMIN SERPL-MCNC: 3.8 G/DL (ref 3.5–5.2)
ALP BLD-CCNC: 105 U/L (ref 35–104)
ALT SERPL-CCNC: 9 U/L (ref 0–32)
AMPHETAMINE SCREEN, URINE: NOT DETECTED
ANION GAP SERPL CALCULATED.3IONS-SCNC: 12 MMOL/L (ref 7–16)
AST SERPL-CCNC: 16 U/L (ref 0–31)
BACTERIA: ABNORMAL /HPF
BARBITURATE SCREEN URINE: NOT DETECTED
BASOPHILS ABSOLUTE: 0.04 E9/L (ref 0–0.2)
BASOPHILS RELATIVE PERCENT: 0.4 % (ref 0–2)
BENZODIAZEPINE SCREEN, URINE: NOT DETECTED
BILIRUB SERPL-MCNC: 0.2 MG/DL (ref 0–1.2)
BILIRUBIN URINE: NEGATIVE
BLOOD, URINE: NEGATIVE
BUN BLDV-MCNC: 24 MG/DL (ref 8–23)
CALCIUM SERPL-MCNC: 10.2 MG/DL (ref 8.6–10.2)
CANNABINOID SCREEN URINE: NOT DETECTED
CHLORIDE BLD-SCNC: 95 MMOL/L (ref 98–107)
CHP ED QC CHECK: NORMAL
CLARITY: ABNORMAL
CO2: 35 MMOL/L (ref 22–29)
COCAINE METABOLITE SCREEN URINE: NOT DETECTED
COLOR: YELLOW
CREAT SERPL-MCNC: 0.6 MG/DL (ref 0.5–1)
EOSINOPHILS ABSOLUTE: 0.13 E9/L (ref 0.05–0.5)
EOSINOPHILS RELATIVE PERCENT: 1.3 % (ref 0–6)
FENTANYL SCREEN, URINE: NOT DETECTED
GFR AFRICAN AMERICAN: >60
GFR NON-AFRICAN AMERICAN: >60 ML/MIN/1.73
GLUCOSE BLD-MCNC: 142 MG/DL (ref 74–99)
GLUCOSE BLD-MCNC: 147 MG/DL
GLUCOSE URINE: NEGATIVE MG/DL
HCT VFR BLD CALC: 37.1 % (ref 34–48)
HEMOGLOBIN: 11.7 G/DL (ref 11.5–15.5)
IMMATURE GRANULOCYTES #: 0.04 E9/L
IMMATURE GRANULOCYTES %: 0.4 % (ref 0–5)
KETONES, URINE: NEGATIVE MG/DL
LACTIC ACID: 1.3 MMOL/L (ref 0.5–2.2)
LEUKOCYTE ESTERASE, URINE: ABNORMAL
LIPASE: 8 U/L (ref 13–60)
LYMPHOCYTES ABSOLUTE: 1.14 E9/L (ref 1.5–4)
LYMPHOCYTES RELATIVE PERCENT: 11.7 % (ref 20–42)
Lab: NORMAL
MCH RBC QN AUTO: 29.3 PG (ref 26–35)
MCHC RBC AUTO-ENTMCNC: 31.5 % (ref 32–34.5)
MCV RBC AUTO: 92.8 FL (ref 80–99.9)
METER GLUCOSE: 146 MG/DL (ref 74–99)
METHADONE SCREEN, URINE: NOT DETECTED
MONOCYTES ABSOLUTE: 0.56 E9/L (ref 0.1–0.95)
MONOCYTES RELATIVE PERCENT: 5.8 % (ref 2–12)
NEUTROPHILS ABSOLUTE: 7.82 E9/L (ref 1.8–7.3)
NEUTROPHILS RELATIVE PERCENT: 80.4 % (ref 43–80)
NITRITE, URINE: NEGATIVE
OPIATE SCREEN URINE: NOT DETECTED
OXYCODONE URINE: NOT DETECTED
PDW BLD-RTO: 14.4 FL (ref 11.5–15)
PH UA: 7 (ref 5–9)
PHENCYCLIDINE SCREEN URINE: NOT DETECTED
PLATELET # BLD: 237 E9/L (ref 130–450)
PMV BLD AUTO: 9.6 FL (ref 7–12)
POTASSIUM REFLEX MAGNESIUM: 4 MMOL/L (ref 3.5–5)
PROTEIN UA: ABNORMAL MG/DL
RBC # BLD: 4 E12/L (ref 3.5–5.5)
RBC UA: ABNORMAL /HPF (ref 0–2)
SODIUM BLD-SCNC: 142 MMOL/L (ref 132–146)
SPECIFIC GRAVITY UA: 1.01 (ref 1–1.03)
TOTAL PROTEIN: 8.4 G/DL (ref 6.4–8.3)
TROPONIN: <0.01 NG/ML (ref 0–0.03)
UROBILINOGEN, URINE: 0.2 E.U./DL
WBC # BLD: 9.7 E9/L (ref 4.5–11.5)
WBC UA: ABNORMAL /HPF (ref 0–5)

## 2019-11-22 PROCEDURE — 36415 COLL VENOUS BLD VENIPUNCTURE: CPT

## 2019-11-22 PROCEDURE — 80307 DRUG TEST PRSMV CHEM ANLYZR: CPT

## 2019-11-22 PROCEDURE — 82962 GLUCOSE BLOOD TEST: CPT

## 2019-11-22 PROCEDURE — 87088 URINE BACTERIA CULTURE: CPT

## 2019-11-22 PROCEDURE — 82140 ASSAY OF AMMONIA: CPT

## 2019-11-22 PROCEDURE — G0480 DRUG TEST DEF 1-7 CLASSES: HCPCS

## 2019-11-22 PROCEDURE — 93005 ELECTROCARDIOGRAM TRACING: CPT

## 2019-11-22 PROCEDURE — 80053 COMPREHEN METABOLIC PANEL: CPT

## 2019-11-22 PROCEDURE — 83690 ASSAY OF LIPASE: CPT

## 2019-11-22 PROCEDURE — 84484 ASSAY OF TROPONIN QUANT: CPT

## 2019-11-22 PROCEDURE — 81001 URINALYSIS AUTO W/SCOPE: CPT

## 2019-11-22 PROCEDURE — 71045 X-RAY EXAM CHEST 1 VIEW: CPT

## 2019-11-22 PROCEDURE — 83605 ASSAY OF LACTIC ACID: CPT

## 2019-11-22 PROCEDURE — 99285 EMERGENCY DEPT VISIT HI MDM: CPT

## 2019-11-22 PROCEDURE — 85025 COMPLETE CBC W/AUTO DIFF WBC: CPT

## 2019-11-22 RX ORDER — GRANULES FOR ORAL 3 G/1
3 POWDER ORAL ONCE
Status: COMPLETED | OUTPATIENT
Start: 2019-11-23 | End: 2019-11-23

## 2019-11-23 VITALS
RESPIRATION RATE: 22 BRPM | TEMPERATURE: 98.9 F | OXYGEN SATURATION: 96 % | BODY MASS INDEX: 24.89 KG/M2 | DIASTOLIC BLOOD PRESSURE: 57 MMHG | HEART RATE: 66 BPM | WEIGHT: 145 LBS | SYSTOLIC BLOOD PRESSURE: 102 MMHG

## 2019-11-23 LAB
ACETAMINOPHEN LEVEL: <5 MCG/ML (ref 10–30)
AMMONIA: 20 UMOL/L (ref 11–51)
ETHANOL: <10 MG/DL (ref 0–0.08)
SALICYLATE, SERUM: <0.3 MG/DL (ref 0–30)
TRICYCLIC ANTIDEPRESSANTS SCREEN SERUM: NEGATIVE NG/ML

## 2019-11-23 PROCEDURE — 6370000000 HC RX 637 (ALT 250 FOR IP): Performed by: STUDENT IN AN ORGANIZED HEALTH CARE EDUCATION/TRAINING PROGRAM

## 2019-11-23 RX ADMIN — FOSFOMYCIN TROMETHAMINE 1 PACKET: 3 POWDER ORAL at 00:08

## 2019-11-24 LAB — URINE CULTURE, ROUTINE: NORMAL

## 2019-11-26 LAB
EKG ATRIAL RATE: 70 BPM
EKG P AXIS: 35 DEGREES
EKG P-R INTERVAL: 144 MS
EKG Q-T INTERVAL: 414 MS
EKG QRS DURATION: 88 MS
EKG QTC CALCULATION (BAZETT): 447 MS
EKG R AXIS: -32 DEGREES
EKG T AXIS: 41 DEGREES
EKG VENTRICULAR RATE: 70 BPM

## 2021-01-09 ENCOUNTER — APPOINTMENT (OUTPATIENT)
Dept: GENERAL RADIOLOGY | Age: 66
End: 2021-01-09
Payer: MEDICAID

## 2021-01-09 ENCOUNTER — HOSPITAL ENCOUNTER (EMERGENCY)
Age: 66
Discharge: HOME | End: 2021-01-10
Attending: EMERGENCY MEDICINE
Payer: MEDICAID

## 2021-01-09 DIAGNOSIS — E86.0 MILD DEHYDRATION: ICD-10-CM

## 2021-01-09 DIAGNOSIS — M21.932 DEFORMITY OF LEFT WRIST: ICD-10-CM

## 2021-01-09 DIAGNOSIS — R10.30 LOWER ABDOMINAL PAIN: Primary | ICD-10-CM

## 2021-01-09 DIAGNOSIS — Z20.822 COVID-19 VIRUS NOT DETECTED: ICD-10-CM

## 2021-01-09 LAB
BASOPHILS ABSOLUTE: 0.03 E9/L (ref 0–0.2)
BASOPHILS RELATIVE PERCENT: 0.5 % (ref 0–2)
EOSINOPHILS ABSOLUTE: 0.28 E9/L (ref 0.05–0.5)
EOSINOPHILS RELATIVE PERCENT: 4.4 % (ref 0–6)
HCT VFR BLD CALC: 34.9 % (ref 34–48)
HEMOGLOBIN: 11.2 G/DL (ref 11.5–15.5)
IMMATURE GRANULOCYTES #: 0.01 E9/L
IMMATURE GRANULOCYTES %: 0.2 % (ref 0–5)
LYMPHOCYTES ABSOLUTE: 1.81 E9/L (ref 1.5–4)
LYMPHOCYTES RELATIVE PERCENT: 28.5 % (ref 20–42)
MCH RBC QN AUTO: 30.6 PG (ref 26–35)
MCHC RBC AUTO-ENTMCNC: 32.1 % (ref 32–34.5)
MCV RBC AUTO: 95.4 FL (ref 80–99.9)
MONOCYTES ABSOLUTE: 0.82 E9/L (ref 0.1–0.95)
MONOCYTES RELATIVE PERCENT: 12.9 % (ref 2–12)
NEUTROPHILS ABSOLUTE: 3.4 E9/L (ref 1.8–7.3)
NEUTROPHILS RELATIVE PERCENT: 53.5 % (ref 43–80)
PDW BLD-RTO: 13.3 FL (ref 11.5–15)
PLATELET # BLD: 236 E9/L (ref 130–450)
PMV BLD AUTO: 10.5 FL (ref 7–12)
RBC # BLD: 3.66 E12/L (ref 3.5–5.5)
WBC # BLD: 6.4 E9/L (ref 4.5–11.5)

## 2021-01-09 PROCEDURE — 96374 THER/PROPH/DIAG INJ IV PUSH: CPT

## 2021-01-09 PROCEDURE — U0002 COVID-19 LAB TEST NON-CDC: HCPCS

## 2021-01-09 PROCEDURE — 83605 ASSAY OF LACTIC ACID: CPT

## 2021-01-09 PROCEDURE — 93005 ELECTROCARDIOGRAM TRACING: CPT | Performed by: STUDENT IN AN ORGANIZED HEALTH CARE EDUCATION/TRAINING PROGRAM

## 2021-01-09 PROCEDURE — U0003 INFECTIOUS AGENT DETECTION BY NUCLEIC ACID (DNA OR RNA); SEVERE ACUTE RESPIRATORY SYNDROME CORONAVIRUS 2 (SARS-COV-2) (CORONAVIRUS DISEASE [COVID-19]), AMPLIFIED PROBE TECHNIQUE, MAKING USE OF HIGH THROUGHPUT TECHNOLOGIES AS DESCRIBED BY CMS-2020-01-R: HCPCS

## 2021-01-09 PROCEDURE — 99285 EMERGENCY DEPT VISIT HI MDM: CPT

## 2021-01-09 PROCEDURE — 36415 COLL VENOUS BLD VENIPUNCTURE: CPT

## 2021-01-09 PROCEDURE — 96361 HYDRATE IV INFUSION ADD-ON: CPT

## 2021-01-09 PROCEDURE — 83690 ASSAY OF LIPASE: CPT

## 2021-01-09 PROCEDURE — 71045 X-RAY EXAM CHEST 1 VIEW: CPT

## 2021-01-09 PROCEDURE — 84484 ASSAY OF TROPONIN QUANT: CPT

## 2021-01-09 PROCEDURE — 80053 COMPREHEN METABOLIC PANEL: CPT

## 2021-01-09 PROCEDURE — 85025 COMPLETE CBC W/AUTO DIFF WBC: CPT

## 2021-01-09 RX ORDER — ONDANSETRON 2 MG/ML
4 INJECTION INTRAMUSCULAR; INTRAVENOUS ONCE
Status: COMPLETED | OUTPATIENT
Start: 2021-01-09 | End: 2021-01-10

## 2021-01-09 RX ORDER — ACETAMINOPHEN 500 MG
1000 TABLET ORAL ONCE
Status: COMPLETED | OUTPATIENT
Start: 2021-01-09 | End: 2021-01-10

## 2021-01-09 RX ORDER — 0.9 % SODIUM CHLORIDE 0.9 %
1000 INTRAVENOUS SOLUTION INTRAVENOUS ONCE
Status: COMPLETED | OUTPATIENT
Start: 2021-01-09 | End: 2021-01-10

## 2021-01-09 ASSESSMENT — PAIN DESCRIPTION - LOCATION: LOCATION: ABDOMEN

## 2021-01-10 ENCOUNTER — APPOINTMENT (OUTPATIENT)
Dept: GENERAL RADIOLOGY | Age: 66
End: 2021-01-10
Payer: MEDICAID

## 2021-01-10 ENCOUNTER — APPOINTMENT (OUTPATIENT)
Dept: CT IMAGING | Age: 66
End: 2021-01-10
Payer: MEDICAID

## 2021-01-10 VITALS
RESPIRATION RATE: 22 BRPM | DIASTOLIC BLOOD PRESSURE: 71 MMHG | TEMPERATURE: 99.6 F | SYSTOLIC BLOOD PRESSURE: 115 MMHG | HEART RATE: 70 BPM | OXYGEN SATURATION: 96 % | WEIGHT: 194 LBS | BODY MASS INDEX: 33.3 KG/M2

## 2021-01-10 LAB
ALBUMIN SERPL-MCNC: 3.5 G/DL (ref 3.5–5.2)
ALP BLD-CCNC: 147 U/L (ref 35–104)
ALT SERPL-CCNC: 14 U/L (ref 0–32)
ANION GAP SERPL CALCULATED.3IONS-SCNC: 11 MMOL/L (ref 7–16)
AST SERPL-CCNC: 28 U/L (ref 0–31)
BACTERIA: NORMAL /HPF
BILIRUB SERPL-MCNC: <0.2 MG/DL (ref 0–1.2)
BILIRUBIN URINE: NEGATIVE
BLOOD, URINE: NEGATIVE
BUN BLDV-MCNC: 27 MG/DL (ref 8–23)
CALCIUM SERPL-MCNC: 9.2 MG/DL (ref 8.6–10.2)
CHLORIDE BLD-SCNC: 100 MMOL/L (ref 98–107)
CLARITY: CLEAR
CO2: 25 MMOL/L (ref 22–29)
COLOR: YELLOW
CREAT SERPL-MCNC: 0.7 MG/DL (ref 0.5–1)
GFR AFRICAN AMERICAN: >60
GFR NON-AFRICAN AMERICAN: >60 ML/MIN/1.73
GLUCOSE BLD-MCNC: 127 MG/DL (ref 74–99)
GLUCOSE URINE: NEGATIVE MG/DL
KETONES, URINE: NEGATIVE MG/DL
LACTIC ACID: 1.5 MMOL/L (ref 0.5–2.2)
LEUKOCYTE ESTERASE, URINE: NEGATIVE
LIPASE: 25 U/L (ref 13–60)
NITRITE, URINE: NEGATIVE
PH UA: 7 (ref 5–9)
POTASSIUM REFLEX MAGNESIUM: 5.6 MMOL/L (ref 3.5–5)
POTASSIUM SERPL-SCNC: 4.6 MMOL/L (ref 3.5–5)
PROTEIN UA: NEGATIVE MG/DL
RBC UA: NORMAL /HPF (ref 0–2)
SARS-COV-2, NAAT: NOT DETECTED
SARS-COV-2, PCR: NORMAL
SODIUM BLD-SCNC: 136 MMOL/L (ref 132–146)
SPECIFIC GRAVITY UA: 1.01 (ref 1–1.03)
TOTAL PROTEIN: 7.6 G/DL (ref 6.4–8.3)
TROPONIN: <0.01 NG/ML (ref 0–0.03)
UROBILINOGEN, URINE: 0.2 E.U./DL
WBC UA: NORMAL /HPF (ref 0–5)

## 2021-01-10 PROCEDURE — 81001 URINALYSIS AUTO W/SCOPE: CPT

## 2021-01-10 PROCEDURE — 74176 CT ABD & PELVIS W/O CONTRAST: CPT

## 2021-01-10 PROCEDURE — 73110 X-RAY EXAM OF WRIST: CPT

## 2021-01-10 PROCEDURE — 2580000003 HC RX 258: Performed by: STUDENT IN AN ORGANIZED HEALTH CARE EDUCATION/TRAINING PROGRAM

## 2021-01-10 PROCEDURE — 6370000000 HC RX 637 (ALT 250 FOR IP): Performed by: STUDENT IN AN ORGANIZED HEALTH CARE EDUCATION/TRAINING PROGRAM

## 2021-01-10 PROCEDURE — 36415 COLL VENOUS BLD VENIPUNCTURE: CPT

## 2021-01-10 PROCEDURE — 84132 ASSAY OF SERUM POTASSIUM: CPT

## 2021-01-10 PROCEDURE — 6360000002 HC RX W HCPCS: Performed by: STUDENT IN AN ORGANIZED HEALTH CARE EDUCATION/TRAINING PROGRAM

## 2021-01-10 PROCEDURE — 87040 BLOOD CULTURE FOR BACTERIA: CPT

## 2021-01-10 PROCEDURE — 87088 URINE BACTERIA CULTURE: CPT

## 2021-01-10 RX ADMIN — ACETAMINOPHEN 1000 MG: 500 TABLET, FILM COATED ORAL at 00:14

## 2021-01-10 RX ADMIN — ONDANSETRON 4 MG: 2 INJECTION INTRAMUSCULAR; INTRAVENOUS at 00:13

## 2021-01-10 RX ADMIN — SODIUM CHLORIDE 1000 ML: 9 INJECTION, SOLUTION INTRAVENOUS at 00:13

## 2021-01-10 ASSESSMENT — ENCOUNTER SYMPTOMS
NAUSEA: 1
DIARRHEA: 0
SHORTNESS OF BREATH: 0
COUGH: 0
BACK PAIN: 0
VOMITING: 1
ABDOMINAL PAIN: 1
PHOTOPHOBIA: 0
TROUBLE SWALLOWING: 0

## 2021-01-10 ASSESSMENT — PAIN SCALES - GENERAL: PAINLEVEL_OUTOF10: 7

## 2021-01-10 NOTE — ED PROVIDER NOTES
The patient is a 25-year-old female with a history of osteoarthritis, rheumatoid arthritis, torticollis, and chronic contractures who presents to the emergency department via EMS from her nursing facility for abdominal pain. Her symptoms have been gradual in onset, intermittent, and mild in severity. Nothing makes better or worse. Patient states that she has been experiencing abdominal pain intermittently over the past several months. However, she reports over the past week her abdominal pain has worsened. She states that most of her pain is in the lower abdomen. She describes as an aching pain. She not take any thing for symptoms. The patient states that she is nauseated and vomited several times. She states she has not had symptoms like this in the past.  She does not have a history of any abdominal surgeries. The patient denies any fever, chills, diarrhea, hematemesis, hematochezia, melena, chest pain, shortness of breath, recent hospitalization, recent illness, or other acute symptoms or concerns. The history is provided by the patient and the EMS personnel. Review of Systems   Constitutional: Negative for chills, fatigue and fever. HENT: Negative for congestion and trouble swallowing. Eyes: Negative for photophobia and visual disturbance. Respiratory: Negative for cough and shortness of breath. Cardiovascular: Negative for chest pain and leg swelling. Gastrointestinal: Positive for abdominal pain, nausea and vomiting. Negative for diarrhea. Genitourinary: Negative for dysuria, flank pain, frequency and hematuria. Musculoskeletal: Negative for back pain and neck pain. Skin: Negative for rash and wound. Neurological: Negative for dizziness, syncope, weakness, light-headedness, numbness and headaches. All other systems reviewed and are negative. Physical Exam  Vitals signs and nursing note reviewed. Constitutional:       General: She is not in acute distress. Appearance: She is well-developed. She is ill-appearing. She is not toxic-appearing or diaphoretic. Comments: Chronically ill-appearing with contractures of the upper and lower extremities, but no acute distress. There is torticollis to the right side. HENT:      Head: Normocephalic and atraumatic. Nose: Nose normal.      Mouth/Throat:      Lips: Pink. No lesions. Mouth: Mucous membranes are moist.   Eyes:      General: Lids are normal.   Neck:      Musculoskeletal: Normal range of motion and neck supple. Cardiovascular:      Rate and Rhythm: Normal rate and regular rhythm. Heart sounds: Normal heart sounds, S1 normal and S2 normal. No murmur. Pulmonary:      Effort: Pulmonary effort is normal. No respiratory distress. Breath sounds: Normal breath sounds and air entry. No decreased breath sounds, wheezing, rhonchi or rales. Abdominal:      General: Bowel sounds are normal. There is distension (Mild abdominal distention, no rigidity, no rebound tenderness, no guarding). Palpations: Abdomen is soft. Tenderness: There is generalized abdominal tenderness (Minimal diffuse tenderness to palpation. ). There is no guarding or rebound. Hernia: No hernia is present. Musculoskeletal:      Left wrist: She exhibits decreased range of motion and deformity. She exhibits no tenderness, no swelling and no crepitus. Skin:     General: Skin is warm and dry. Neurological:      Mental Status: She is alert and oriented to person, place, and time. Motor: No tremor or abnormal muscle tone. Coordination: Coordination normal.          Procedures     MDM  Number of Diagnoses or Management Options  Diagnosis management comments: The patient is a 61-year-old female who presents to the emergency department complaining of abdominal pain. She is slightly febrile on arrival but otherwise hemodynamically stable, nontoxic in appearance, and in no acute distress.   The patient does have diffuse generalized abdominal tenderness to palpation with slight distention. But there is no rigidity, no rebound tenderness, no guarding. Covid negative, no leukocytosis, no lactic acidosis, troponin negative, CT abdomen pelvis does not show any acute intra-abdominal abnormalities. There is a fracture deformity of left distal radius that is identified and will proceed with x-rays. Urinalysis pending. EKG:  This EKG is signed and interpreted by me. Rate: 89  Rhythm: Sinus  Interpretation: Normal sinus rhythm, left axis deviation, ossific ST changes throughout, no acute elevations or depressions, intervals within normal limits, QTC is 423  Comparison: stable as compared to patient's most recent EKG          --------------------------------------------- PAST HISTORY ---------------------------------------------  Past Medical History:  has a past medical history of Arthritis, Diabetes, Heart murmur, History of blood transfusion, Leg edema, Osteoarthritis, Rheumatoid arthritis ([facility] [address] and Torticollis. Past Surgical History:  has a past surgical history that includes Tonsillectomy (1966); Colonoscopy (2001); Carpal tunnel release (2003); Ankle fracture surgery (2005); knee surgery (06/09/2010); joint replacement; pr adj tiss xfer head,fac,hand <10sqcm (Left, 10/19/2018); and Toe amputation (Left). Social History:  reports that she has never smoked. She has never used smokeless tobacco. She reports that she does not drink alcohol or use drugs. Family History: family history is not on file. The patients home medications have been reviewed.     Allergies: Iodine    -------------------------------------------------- RESULTS -------------------------------------------------  Labs:  Results for orders placed or performed during the hospital encounter of 01/09/21   CBC auto differential   Result Value Ref Range    WBC 6.4 4.5 - 11.5 E9/L    RBC 3.66 3.50 - 5.50 E12/L    Hemoglobin 11.2 (L) 11.5 E. U./dL    Nitrite, Urine Negative Negative    Leukocyte Esterase, Urine Negative Negative    WBC, UA NONE 0 - 5 /HPF    RBC, UA NONE 0 - 2 /HPF    Bacteria, UA NONE SEEN None Seen /HPF   COVID-19   Result Value Ref Range    SARS-CoV-2, NAAT Not Detected Not Detected    SARS-CoV-2, PCR Not performed Not Detected   Potassium   Result Value Ref Range    Potassium 4.6 3.5 - 5.0 mmol/L   EKG 12 Lead   Result Value Ref Range    Ventricular Rate 89 BPM    Atrial Rate 89 BPM    P-R Interval 136 ms    QRS Duration 84 ms    Q-T Interval 348 ms    QTc Calculation (Bazett) 423 ms    P Axis 31 degrees    R Axis -32 degrees    T Axis 21 degrees       Radiology:  XR WRIST LEFT (MIN 3 VIEWS)   Final Result   Chronic appearing extensive deformity of the wrist as described above. CT ABDOMEN PELVIS WO CONTRAST   Final Result   Cholelithiasis with no signs of cholecystitis. There is diverticulosis without evidence of diverticulitis. Suggestion of fracture deformity of left distal radius. Clinical correlation   is recommended. If appropriate dedicated left wrist radiographs can be   obtained. Otherwise unremarkable CT of abdomen. XR CHEST PORTABLE   Final Result   No acute findings. ------------------------- NURSING NOTES AND VITALS REVIEWED ---------------------------  Date / Time Roomed:  1/9/2021 11:19 PM  ED Bed Assignment:  01/01    The nursing notes within the ED encounter and vital signs as below have been reviewed. /71   Pulse 70   Temp 99.6 °F (37.6 °C) (Oral)   Resp 22   Wt 194 lb (88 kg)   SpO2 96%   BMI 33.30 kg/m²   Oxygen Saturation Interpretation: Normal      ------------------------------------------ PROGRESS NOTES ------------------------------------------  I have spoken with the patient and discussed todays results, in addition to providing specific details for the plan of care and counseling regarding the diagnosis and prognosis.   Their questions are answered at this time and they are agreeable with the plan. I discussed at length with them reasons for immediate return here for re evaluation. They will followup with primary care by calling their office tomorrow. --------------------------------- ADDITIONAL PROVIDER NOTES ---------------------------------  At this time the patient is without objective evidence of an acute process requiring hospitalization or inpatient management. They have remained hemodynamically stable throughout their entire ED visit and are stable for discharge with outpatient follow-up. The plan has been discussed in detail and they are aware of the specific conditions for emergent return, as well as the importance of follow-up. Discharge Medication List as of 1/10/2021  5:02 AM          Diagnosis:  1. Lower abdominal pain    2. COVID-19 virus not detected    3. Nursing home resident    4. Deformity of left wrist    5. Mild dehydration        Disposition:  Patient's disposition: Discharge to nursing home  Patient's condition is stable. ATTENDING PROVIDER ATTESTATION:     Jese Rivera presented to the emergency department for evaluation of Abdominal Pain    I have reviewed and discussed the case, including pertinent history (medical, surgical, family and social) and exam findings with the Resident and the Nurse assigned to Jese Rivera. I have personally performed and/or participated in the history, exam, medical decision making, and procedures and agree with all pertinent clinical information. I have reviewed my findings and recommendations with Jese Rivera and members of family present at the time of disposition. MDM:     I, Dr. Nitesh Fleming am the primary provider of record    Patient with lower abdominal pain as well as generalized abdominal pain. Work-up undertaken is reassuring. She states she feels much better after IV medications and fluids. X-ray noted, she denies any recent traumas falls or injuries and has no tenderness.   No infectious etiology or acute etiology identified. Will discharge back to nursing facility to follow-up with her physician there, return for worsening signs or symptoms. My findings/plan: The primary encounter diagnosis was Lower abdominal pain. Diagnoses of COVID-19 virus not detected, Nursing home resident, Deformity of left wrist, and Mild dehydration were also pertinent to this visit.   Discharge Medication List as of 1/10/2021  5:02 AM        DO Jacqui Mendoza DO  01/10/21 [id number]

## 2021-01-11 LAB
EKG ATRIAL RATE: 89 BPM
EKG P AXIS: 31 DEGREES
EKG P-R INTERVAL: 136 MS
EKG Q-T INTERVAL: 348 MS
EKG QRS DURATION: 84 MS
EKG QTC CALCULATION (BAZETT): 423 MS
EKG R AXIS: -32 DEGREES
EKG T AXIS: 21 DEGREES
EKG VENTRICULAR RATE: 89 BPM

## 2021-01-12 LAB — URINE CULTURE, ROUTINE: NORMAL

## 2021-01-15 LAB
BLOOD CULTURE, ROUTINE: NORMAL
CULTURE, BLOOD 2: NORMAL

## 2022-09-14 ENCOUNTER — OUTSIDE SERVICES (OUTPATIENT)
Dept: PRIMARY CARE CLINIC | Age: 67
End: 2022-09-14
Payer: MEDICAID

## 2022-09-14 DIAGNOSIS — M54.12 C6 RADICULOPATHY: ICD-10-CM

## 2022-09-14 DIAGNOSIS — M45.9 RHEUMATOID ARTHRITIS INVOLVING VERTEBRA WITH POSITIVE RHEUMATOID FACTOR (HCC): Primary | ICD-10-CM

## 2022-09-14 DIAGNOSIS — R10.13 EPIGASTRIC PAIN: ICD-10-CM

## 2022-09-14 DIAGNOSIS — M43.8X2 ROTATIONAL DEFORMITY OF CERVICAL SPINE: ICD-10-CM

## 2022-09-14 PROCEDURE — 99308 SBSQ NF CARE LOW MDM 20: CPT | Performed by: INTERNAL MEDICINE

## 2022-10-12 ENCOUNTER — OUTSIDE SERVICES (OUTPATIENT)
Dept: PRIMARY CARE CLINIC | Age: 67
End: 2022-10-12
Payer: MEDICAID

## 2022-10-12 DIAGNOSIS — M54.12 C6 RADICULOPATHY: ICD-10-CM

## 2022-10-12 DIAGNOSIS — M17.12 PRIMARY OSTEOARTHRITIS OF LEFT KNEE: ICD-10-CM

## 2022-10-12 DIAGNOSIS — M06.9 RHEUMATOID ARTHRITIS INVOLVING BOTH HANDS, UNSPECIFIED WHETHER RHEUMATOID FACTOR PRESENT (HCC): Primary | ICD-10-CM

## 2022-10-12 DIAGNOSIS — M43.8X2 ROTATIONAL DEFORMITY OF CERVICAL SPINE: ICD-10-CM

## 2022-10-12 PROCEDURE — 99307 SBSQ NF CARE SF MDM 10: CPT | Performed by: INTERNAL MEDICINE

## 2022-11-09 ENCOUNTER — OUTSIDE SERVICES (OUTPATIENT)
Dept: PRIMARY CARE CLINIC | Age: 67
End: 2022-11-09
Payer: MEDICAID

## 2022-11-09 DIAGNOSIS — Z79.4 TYPE 2 DIABETES MELLITUS WITHOUT COMPLICATION, WITH LONG-TERM CURRENT USE OF INSULIN (HCC): ICD-10-CM

## 2022-11-09 DIAGNOSIS — M43.8X2 ROTATIONAL DEFORMITY OF CERVICAL SPINE: ICD-10-CM

## 2022-11-09 DIAGNOSIS — M06.9 RHEUMATOID ARTHRITIS INVOLVING BOTH HANDS, UNSPECIFIED WHETHER RHEUMATOID FACTOR PRESENT (HCC): ICD-10-CM

## 2022-11-09 DIAGNOSIS — E11.9 TYPE 2 DIABETES MELLITUS WITHOUT COMPLICATION, WITH LONG-TERM CURRENT USE OF INSULIN (HCC): ICD-10-CM

## 2022-11-09 DIAGNOSIS — M54.12 C6 RADICULOPATHY: Primary | ICD-10-CM

## 2022-11-09 PROCEDURE — 99307 SBSQ NF CARE SF MDM 10: CPT | Performed by: INTERNAL MEDICINE

## 2022-12-14 ENCOUNTER — OUTSIDE SERVICES (OUTPATIENT)
Dept: PRIMARY CARE CLINIC | Age: 67
End: 2022-12-14

## 2022-12-14 DIAGNOSIS — E11.9 TYPE 2 DIABETES MELLITUS WITHOUT COMPLICATION, WITH LONG-TERM CURRENT USE OF INSULIN (HCC): ICD-10-CM

## 2022-12-14 DIAGNOSIS — M54.12 C6 RADICULOPATHY: Primary | ICD-10-CM

## 2022-12-14 DIAGNOSIS — M45.9 RHEUMATOID ARTHRITIS INVOLVING VERTEBRA WITH POSITIVE RHEUMATOID FACTOR (HCC): ICD-10-CM

## 2022-12-14 DIAGNOSIS — Z79.4 TYPE 2 DIABETES MELLITUS WITHOUT COMPLICATION, WITH LONG-TERM CURRENT USE OF INSULIN (HCC): ICD-10-CM

## 2023-01-11 ENCOUNTER — OUTSIDE SERVICES (OUTPATIENT)
Dept: PRIMARY CARE CLINIC | Age: 68
End: 2023-01-11

## 2023-01-11 DIAGNOSIS — Z79.4 TYPE 2 DIABETES MELLITUS WITHOUT COMPLICATION, WITH LONG-TERM CURRENT USE OF INSULIN (HCC): ICD-10-CM

## 2023-01-11 DIAGNOSIS — M06.9 RHEUMATOID ARTHRITIS INVOLVING BOTH HANDS, UNSPECIFIED WHETHER RHEUMATOID FACTOR PRESENT (HCC): ICD-10-CM

## 2023-01-11 DIAGNOSIS — M94.0 COSTOCHONDRITIS: Primary | ICD-10-CM

## 2023-01-11 DIAGNOSIS — R10.13 EPIGASTRIC PAIN: ICD-10-CM

## 2023-01-11 DIAGNOSIS — E11.9 TYPE 2 DIABETES MELLITUS WITHOUT COMPLICATION, WITH LONG-TERM CURRENT USE OF INSULIN (HCC): ICD-10-CM

## 2023-02-08 ENCOUNTER — OUTSIDE SERVICES (OUTPATIENT)
Dept: PRIMARY CARE CLINIC | Age: 68
End: 2023-02-08

## 2023-02-08 DIAGNOSIS — M54.12 C6 RADICULOPATHY: ICD-10-CM

## 2023-02-08 DIAGNOSIS — Z79.4 TYPE 2 DIABETES MELLITUS WITHOUT COMPLICATION, WITH LONG-TERM CURRENT USE OF INSULIN (HCC): Primary | ICD-10-CM

## 2023-02-08 DIAGNOSIS — E11.9 TYPE 2 DIABETES MELLITUS WITHOUT COMPLICATION, WITH LONG-TERM CURRENT USE OF INSULIN (HCC): Primary | ICD-10-CM

## 2023-02-08 DIAGNOSIS — M06.9 RHEUMATOID ARTHRITIS INVOLVING BOTH HANDS, UNSPECIFIED WHETHER RHEUMATOID FACTOR PRESENT (HCC): ICD-10-CM

## 2023-03-08 ENCOUNTER — OUTSIDE SERVICES (OUTPATIENT)
Dept: PRIMARY CARE CLINIC | Age: 68
End: 2023-03-08

## 2023-03-08 DIAGNOSIS — M94.0 COSTOCHONDRITIS: ICD-10-CM

## 2023-03-08 DIAGNOSIS — R10.32 LEFT LOWER QUADRANT ABDOMINAL PAIN: Primary | ICD-10-CM

## 2023-03-08 DIAGNOSIS — M43.8X2 ROTATIONAL DEFORMITY OF CERVICAL SPINE: ICD-10-CM

## 2023-03-08 DIAGNOSIS — M45.9 RHEUMATOID ARTHRITIS INVOLVING VERTEBRA WITH POSITIVE RHEUMATOID FACTOR (HCC): ICD-10-CM

## 2023-04-11 PROBLEM — U07.1 COVID-19: Status: ACTIVE | Noted: 2023-04-11

## 2023-05-02 ENCOUNTER — OUTSIDE SERVICES (OUTPATIENT)
Dept: PRIMARY CARE CLINIC | Age: 68
End: 2023-05-02

## 2023-05-02 DIAGNOSIS — M43.8X2 ROTATIONAL DEFORMITY OF CERVICAL SPINE: Primary | ICD-10-CM

## 2023-05-02 DIAGNOSIS — M45.9 RHEUMATOID ARTHRITIS INVOLVING VERTEBRA WITH POSITIVE RHEUMATOID FACTOR (HCC): ICD-10-CM

## 2023-05-02 DIAGNOSIS — R29.898 DECREASED GRIP STRENGTH: ICD-10-CM

## 2023-05-02 NOTE — PROGRESS NOTES
Admit date: 9/20/2019  Date of Service:  5/2/23      Viet Hannah  1955      HPI   She is a 78 y.o. female resident who is being seen today for follow up . Review of Systems  Respiratory: negative for cough and hemoptysis  Cardiovascular: negative for chest pain and dyspnea  Gastrointestinal: negative for abdominal pain, diarrhea, nausea and vomiting  Genitourinary:negative for dysuria and hematuria  Derm: negative for rash and skin lesion(s)  Neurological: negative for seizures and tremors  Endocrine: negative for diabetic symptoms including polydipsia and polyuria  Current Outpatient Medications   Medication Sig Dispense Refill    furosemide (LASIX) 20 MG tablet Take 1 tablet by mouth daily 30 tablet 2    cetirizine (ZYRTEC ALLERGY) 10 MG tablet Take 1 tablet by mouth daily 30 tablet 3    tiZANidine (ZANAFLEX) 2 MG capsule Take 1 capsule by mouth 3 times daily as needed for Muscle spasms 90 capsule 0    montelukast (SINGULAIR) 10 MG tablet Take 1 tablet by mouth nightly 30 tablet 5    Ferrous Sulfate (IRON) 325 (65 Fe) MG TABS Take 325 mg by mouth daily 30 tablet 3    fluticasone (FLONASE) 50 MCG/ACT nasal spray 1 spray by Nasal route daily       No current facility-administered medications for this visit. Objective     Vital Signs: /78 pulse 94    Physical Exam   Skin:  Warm and dry. No rash or bruises  HEENT:  PERRLA, EOMI  Neck:  No JVD, No thyromegaly, No carotid bruit  Cardiac:  RRR, No gallop or murmur  Lungs:  CTA, Normal percussion  Abdomen: Normal bowel sounds, no HSM, non-tender,obese   Extremities:  No clubbing, edema or cyanosis  Neurological:  Moves all extremities.     Diagnoses and all orders for this visit:    Rotational deformity of cervical spine    Rheumatoid arthritis involving vertebra with positive rheumatoid factor (HCC)    Decreased  strength      Stable continue same       David Naqvi MD            *Note was creating using voice recognition

## 2023-10-17 NOTE — PROGRESS NOTES
We reviewed pituitary physiology and pathophysiology as well as the effects of checkpoint inhibitors on the pituitary: the combination of nivolumab and ipilimumab carry an approximately 6.5% risk of hypophysitis. Specific pituitary axes:   Adrenal. On prednisone as per Dr Clinton Guo and would need to be concerned regarding adrenal insufficiency if this is stopped. Thyroid. She is at risk for secondary hypothyroidism  Gonadal. She is post menopausal.  Growth hormone. No indication to check igf1. Prolactin. No indication to check prolactin. Vasopressin.   Would monitor for signs and symptoms of diabetes insipidus 8/13/2019  5:03 PM   Wound Length (cm) 0.9 cm 8/13/2019  3:57 PM   Wound Width (cm) 0.9 cm 8/13/2019  3:57 PM   Wound Depth (cm) 0.2 cm 8/13/2019  3:57 PM   Wound Surface Area (cm^2) 0.81 cm^2 8/13/2019  3:57 PM   Wound Volume (cm^3) 0.16 cm^3 8/13/2019  3:57 PM   Post-Procedure Length (cm) 0.9 cm 8/13/2019  4:41 PM   Post-Procedure Width (cm) 0.9 cm 8/13/2019  4:41 PM   Post-Procedure Depth (cm) 0.3 cm 8/13/2019  4:41 PM   Post-Procedure Surface Area (cm^2) 0.81 cm^2 8/13/2019  4:41 PM   Post-Procedure Volume (cm^3) 0.24 cm^3 8/13/2019  4:41 PM   Wound Assessment Red;Yellow 8/13/2019  3:57 PM   Drainage Amount Moderate 8/13/2019  3:57 PM   Drainage Description Serosanguinous; Serous 8/13/2019  3:57 PM   Odor None 8/13/2019  3:57 PM   Kenna-wound Assessment Pink 8/13/2019  3:57 PM   Number of days: 0     Wound   serous exudate noted  Errythema - Present    (this wound was originally measured on:)            Measurements shown are from today's visit. Wound 08/13/19 Toe (Comment  which one) Left;Dorsal #3 acquired 8-5-19 3rd toe-Wound Assessment: Red, Yellow  Wound 07/30/19 Foot Plantar;Left #2 new left foot plantar aquired 7/23/19-Wound Assessment: Pale     Wound 08/13/19 Toe (Comment  which one) Left;Dorsal #3 acquired 8-5-19 3rd toe-Wound Assessment: Red, Yellow  Wound 07/30/19 Foot Plantar;Left #2 new left foot plantar aquired 7/23/19-Wound Assessment: Pale  Wound 08/13/19 Toe (Comment  which one) Left;Dorsal #3 acquired 8-5-19 3rd toe-Kenna-wound Assessment: Pink  Wound 07/30/19 Foot Plantar;Left #2 new left foot plantar aquired 7/23/19-Knena-wound Assessment: Maceration  Wound 08/13/19 Toe (Comment  which one) Left;Dorsal #3 acquired 8-5-19 3rd toe-Drainage Amount:  Moderate  Wound 07/30/19 Foot Plantar;Left #2 new left foot plantar aquired 7/23/19-Drainage Amount: Scant  Wound 08/13/19 Toe (Comment  which one) Left;Dorsal #3 acquired 8-5-19 3rd toe-Drainage Description: Serosanguinous, Serous  Wound 07/30/19 Foot Plantar;Left #2 new left foot plantar aquired 7/23/19-Drainage Description: Serous  Wound 08/13/19 Toe (Comment  which one) Left;Dorsal #3 acquired 8-5-19 3rd toe-Odor: None  Wound 07/30/19 Foot Plantar;Left #2 new left foot plantar aquired 7/23/19-Odor: None         Wound 07/24/14 Venous ulcer Leg Right;Upper new wound, #1 venous. right upper leg, date of onset may 2014 (Active)   Number of days: 1846       Wound 07/24/14 Venous ulcer Ankle Medial new wound #2 right medial ankle. venous date of onset may 2014 (Active)   Number of days: 1846       Wound 08/07/14 Diabetic Ulcer Heel Posterior new wound #3 diabetic ulcer right heel. briones 2. date of onset aug 2014 (Active)   Number of days: 1832       Incision 10/19/18 Foot Left (Active)   Number of days: 298       Wound 06/06/19 Ankle Posterior; Left new #1 left post ankle. pressure.  onset may 2019 (Active)   Wound Image   7/30/2019  3:45 PM   Wound Pressure Stage  2 6/6/2019 11:46 AM   Dressing/Treatment Wound gel;Dry Dressing 6/6/2019 11:56 AM   Wound Cleansed Rinsed/Irrigated with saline 6/6/2019 11:56 AM   Wound Length (cm) 0 cm 7/30/2019  3:45 PM   Wound Width (cm) 0 cm 7/30/2019  3:45 PM   Wound Depth (cm) 0 cm 7/30/2019  3:45 PM   Wound Surface Area (cm^2) 0 cm^2 7/30/2019  3:45 PM   Change in Wound Size % (l*w) 100 7/30/2019  3:45 PM   Wound Volume (cm^3) 0 cm^3 7/30/2019  3:45 PM   Wound Healing % 100 7/30/2019  3:45 PM   Post-Procedure Length (cm) 0.7 cm 6/6/2019 11:46 AM   Post-Procedure Width (cm) 0.6 cm 6/6/2019 11:46 AM   Post-Procedure Depth (cm) 0.2 cm 6/6/2019 11:46 AM   Post-Procedure Surface Area (cm^2) 0.42 cm^2 6/6/2019 11:46 AM   Post-Procedure Volume (cm^3) 0.08 cm^3 6/6/2019 11:46 AM   Wound Assessment Red 6/25/2019  3:15 PM   Drainage Amount None 6/25/2019  3:15 PM   Drainage Description Yellow 6/6/2019 11:24 AM   Odor None 6/6/2019 11:24 AM   Kenna-wound Assessment Pink 6/25/2019  3:15 PM   Red%Wound Bed 50 6/6/2019 11:24 AM   Yellow%Wound

## 2023-11-08 NOTE — PROGRESS NOTES
OCCUPATIONAL THERAPY PROGRESS NOTE    Date:  2018  Initial Evaluation Date: 18    Patient Name:  Robb Cameron    :  1955  Restrictions/Precautions: Activity as tolerated, Moderate fall risk  Diagnosis:  RA/ Decreased  strength                                                       Insurance/Certification information:  Medical mutual  Referring Physician:  Niru Mariscal CNP/ Dr Darin Montez  Date of Surgery/Injury: NA  Plan of care signed (Y/N):  N  Visit# / total visits: 3 / 8-10    Pain Level: 4 on scale of 1-10, aching and uncomfortable in the right hand > than the left  Subjective: \" My hands and neck are hurting\". Objective:  Updated POC to be completed by visit 10. INTERVENTION: COMPLETED: SPECIFICS/COMMENTS:   Modality:     MH x B hands x Completed as preconditioning of both hands prior to ROM ex        ADL/ adaptive equip     Opening containers x Pt provided with an one touch can opener   Door handles     Zippers x Practiced zipping with her winter coat. The issue is more the length of the coat and bulkiness vs actual manipulation ability. Discussed modification of her shorter coat. Will check at next session. Using scissors  Spring loaded adaptive scissors provided        AROM:      General AROM B hands x Increased preserved AROM noted in the left hand > than the right. Strengthening:     Putty ex- soft x Gripping, roll and pinch, taffy pull (with med soft)        Other:     Educational info x Pt provided with information about arthritis pain mgmt, and joint protection to read at home- will review at next appt. Splinting x Splinting for protection of the left wrist and hand joints discussed. Will order combo wrist/ hand UD splint to help. Assessment/Comments: Pt is making Fair + progress toward stated plan of care.    -Rehab Potential: Good  -Requires OT Follow Up: Yes  Time In:1050           Time Out: 1140             Visit #: 3    Treatment Per providers order patient was notified of below results. Patient verbalized an understanding and had no further questions or concerns.     ----- Message from Yasir Hu MD sent at 11/7/2023  7:02 PM CST -----  Call let patient know strep is negative.     Charges: Mins Units   Modalities: MH 5 0   Ther Exercise 30 2   Manual Therapy     Thera Activities     ADL/Home Mgt  15 1   Neuro Re-education     Gait Training     Group Therapy     Non-Billable Service Time     Other     Total Time/Units 50 3     -Response to Treatment: session tolerated well. Goals: Goals for pt can be seen on initial eval occurring on 6-20-18    Plan:   [x]  Continue Plan of care: Pt education continues at each visit to obtain maximum benefits from skilled OT intervention.   []  [facility] of care:   []  Discharge:      Yeny Shi OT/L  [id number]

## 2024-04-09 ENCOUNTER — APPOINTMENT (OUTPATIENT)
Dept: GENERAL RADIOLOGY | Age: 69
DRG: 720 | End: 2024-04-09
Payer: MEDICAID

## 2024-04-09 ENCOUNTER — HOSPITAL ENCOUNTER (INPATIENT)
Age: 69
LOS: 7 days | Discharge: HOME | DRG: 720 | End: 2024-04-16
Attending: EMERGENCY MEDICINE | Admitting: INTERNAL MEDICINE
Payer: MEDICAID

## 2024-04-09 ENCOUNTER — APPOINTMENT (OUTPATIENT)
Dept: CT IMAGING | Age: 69
DRG: 720 | End: 2024-04-09
Payer: MEDICAID

## 2024-04-09 DIAGNOSIS — R79.89 D-DIMER, ELEVATED: ICD-10-CM

## 2024-04-09 DIAGNOSIS — I47.10 PAROXYSMAL SVT (SUPRAVENTRICULAR TACHYCARDIA) (HCC): ICD-10-CM

## 2024-04-09 DIAGNOSIS — A41.9 SEPSIS, DUE TO UNSPECIFIED ORGANISM, UNSPECIFIED WHETHER ACUTE ORGAN DYSFUNCTION PRESENT (HCC): Primary | ICD-10-CM

## 2024-04-09 DIAGNOSIS — J18.9 PNEUMONIA DUE TO INFECTIOUS ORGANISM, UNSPECIFIED LATERALITY, UNSPECIFIED PART OF LUNG: ICD-10-CM

## 2024-04-09 DIAGNOSIS — N30.01 ACUTE CYSTITIS WITH HEMATURIA: ICD-10-CM

## 2024-04-09 PROBLEM — M43.6 TORTICOLLIS: Status: ACTIVE | Noted: 2024-04-09

## 2024-04-09 PROBLEM — N39.0 SEPSIS DUE TO URINARY TRACT INFECTION (HCC): Status: ACTIVE | Noted: 2024-04-09

## 2024-04-09 PROBLEM — G93.41 SEPTIC ENCEPHALOPATHY: Status: ACTIVE | Noted: 2024-04-09

## 2024-04-09 LAB
ALBUMIN SERPL-MCNC: 3.5 G/DL (ref 3.5–5.2)
ALP SERPL-CCNC: 126 U/L (ref 35–104)
ALT SERPL-CCNC: 15 U/L (ref 0–32)
ANION GAP SERPL CALCULATED.3IONS-SCNC: 16 MMOL/L (ref 7–16)
AST SERPL-CCNC: 16 U/L (ref 0–31)
BACTERIA URNS QL MICRO: ABNORMAL
BASOPHILS # BLD: 0.04 K/UL (ref 0–0.2)
BASOPHILS NFR BLD: 0 % (ref 0–2)
BILIRUB SERPL-MCNC: 0.4 MG/DL (ref 0–1.2)
BILIRUB UR QL STRIP: NEGATIVE
BNP SERPL-MCNC: 617 PG/ML (ref 0–450)
BUN SERPL-MCNC: 28 MG/DL (ref 6–23)
CALCIUM SERPL-MCNC: 9.2 MG/DL (ref 8.6–10.2)
CHLORIDE SERPL-SCNC: 97 MMOL/L (ref 98–107)
CLARITY UR: ABNORMAL
CO2 SERPL-SCNC: 23 MMOL/L (ref 22–29)
COLOR UR: YELLOW
CREAT SERPL-MCNC: 1 MG/DL (ref 0.5–1)
D DIMER: >5250 NG/ML DDU (ref 0–232)
EKG ATRIAL RATE: 109 BPM
EKG P AXIS: -5 DEGREES
EKG P-R INTERVAL: 142 MS
EKG Q-T INTERVAL: 354 MS
EKG QRS DURATION: 84 MS
EKG QTC CALCULATION (BAZETT): 476 MS
EKG R AXIS: -30 DEGREES
EKG T AXIS: 12 DEGREES
EKG VENTRICULAR RATE: 109 BPM
EOSINOPHIL # BLD: 0.01 K/UL (ref 0.05–0.5)
EOSINOPHILS RELATIVE PERCENT: 0 % (ref 0–6)
EPI CELLS #/AREA URNS HPF: ABNORMAL /HPF
ERYTHROCYTE [DISTWIDTH] IN BLOOD BY AUTOMATED COUNT: 14.5 % (ref 11.5–15)
GFR SERPL CREATININE-BSD FRML MDRD: 63 ML/MIN/1.73M2
GLUCOSE BLD-MCNC: 228 MG/DL (ref 74–99)
GLUCOSE BLD-MCNC: 334 MG/DL (ref 74–99)
GLUCOSE SERPL-MCNC: 205 MG/DL (ref 74–99)
GLUCOSE UR STRIP-MCNC: NEGATIVE MG/DL
HCT VFR BLD AUTO: 38.2 % (ref 34–48)
HGB BLD-MCNC: 12.3 G/DL (ref 11.5–15.5)
HGB UR QL STRIP.AUTO: ABNORMAL
IMM GRANULOCYTES # BLD AUTO: 0.1 K/UL (ref 0–0.58)
IMM GRANULOCYTES NFR BLD: 1 % (ref 0–5)
INFLUENZA A BY PCR: NOT DETECTED
INFLUENZA B BY PCR: NOT DETECTED
KETONES UR STRIP-MCNC: NEGATIVE MG/DL
LACTATE BLDV-SCNC: 1.7 MMOL/L (ref 0.5–2.2)
LACTATE BLDV-SCNC: 2.3 MMOL/L (ref 0.5–2.2)
LEUKOCYTE ESTERASE UR QL STRIP: ABNORMAL
LIPASE SERPL-CCNC: 13 U/L (ref 13–60)
LYMPHOCYTES NFR BLD: 0.65 K/UL (ref 1.5–4)
LYMPHOCYTES RELATIVE PERCENT: 4 % (ref 20–42)
MCH RBC QN AUTO: 28.6 PG (ref 26–35)
MCHC RBC AUTO-ENTMCNC: 32.2 G/DL (ref 32–34.5)
MCV RBC AUTO: 88.8 FL (ref 80–99.9)
MONOCYTES NFR BLD: 1.2 K/UL (ref 0.1–0.95)
MONOCYTES NFR BLD: 7 % (ref 2–12)
NEUTROPHILS NFR BLD: 88 % (ref 43–80)
NEUTS SEG NFR BLD: 15.24 K/UL (ref 1.8–7.3)
NITRITE UR QL STRIP: POSITIVE
PH UR STRIP: 5.5 [PH] (ref 5–9)
PLATELET # BLD AUTO: 276 K/UL (ref 130–450)
PMV BLD AUTO: 10.3 FL (ref 7–12)
POTASSIUM SERPL-SCNC: 3.7 MMOL/L (ref 3.5–5)
PROT SERPL-MCNC: 8.1 G/DL (ref 6.4–8.3)
PROT UR STRIP-MCNC: 100 MG/DL
RBC # BLD AUTO: 4.3 M/UL (ref 3.5–5.5)
RBC #/AREA URNS HPF: ABNORMAL /HPF
SARS-COV-2 RDRP RESP QL NAA+PROBE: NOT DETECTED
SODIUM SERPL-SCNC: 136 MMOL/L (ref 132–146)
SP GR UR STRIP: 1.01 (ref 1–1.03)
SPECIMEN DESCRIPTION: NORMAL
TROPONIN I SERPL HS-MCNC: 16 NG/L (ref 0–9)
TROPONIN I SERPL HS-MCNC: 16 NG/L (ref 0–9)
UROBILINOGEN UR STRIP-ACNC: 0.2 EU/DL (ref 0–1)
WBC #/AREA URNS HPF: ABNORMAL /HPF
WBC OTHER # BLD: 17.2 K/UL (ref 4.5–11.5)

## 2024-04-09 PROCEDURE — 6360000002 HC RX W HCPCS: Performed by: INTERNAL MEDICINE

## 2024-04-09 PROCEDURE — 87635 SARS-COV-2 COVID-19 AMP PRB: CPT

## 2024-04-09 PROCEDURE — 85379 FIBRIN DEGRADATION QUANT: CPT

## 2024-04-09 PROCEDURE — 83605 ASSAY OF LACTIC ACID: CPT

## 2024-04-09 PROCEDURE — 93005 ELECTROCARDIOGRAM TRACING: CPT

## 2024-04-09 PROCEDURE — 99285 EMERGENCY DEPT VISIT HI MDM: CPT

## 2024-04-09 PROCEDURE — 82962 GLUCOSE BLOOD TEST: CPT

## 2024-04-09 PROCEDURE — 87449 NOS EACH ORGANISM AG IA: CPT

## 2024-04-09 PROCEDURE — 74176 CT ABD & PELVIS W/O CONTRAST: CPT

## 2024-04-09 PROCEDURE — 96374 THER/PROPH/DIAG INJ IV PUSH: CPT

## 2024-04-09 PROCEDURE — 81001 URINALYSIS AUTO W/SCOPE: CPT

## 2024-04-09 PROCEDURE — 99223 1ST HOSP IP/OBS HIGH 75: CPT | Performed by: INTERNAL MEDICINE

## 2024-04-09 PROCEDURE — 83690 ASSAY OF LIPASE: CPT

## 2024-04-09 PROCEDURE — 71045 X-RAY EXAM CHEST 1 VIEW: CPT

## 2024-04-09 PROCEDURE — 85025 COMPLETE CBC W/AUTO DIFF WBC: CPT

## 2024-04-09 PROCEDURE — 87899 AGENT NOS ASSAY W/OPTIC: CPT

## 2024-04-09 PROCEDURE — 0202U NFCT DS 22 TRGT SARS-COV-2: CPT

## 2024-04-09 PROCEDURE — 6370000000 HC RX 637 (ALT 250 FOR IP): Performed by: INTERNAL MEDICINE

## 2024-04-09 PROCEDURE — 93010 ELECTROCARDIOGRAM REPORT: CPT | Performed by: INTERNAL MEDICINE

## 2024-04-09 PROCEDURE — 2580000003 HC RX 258: Performed by: INTERNAL MEDICINE

## 2024-04-09 PROCEDURE — 84484 ASSAY OF TROPONIN QUANT: CPT

## 2024-04-09 PROCEDURE — 6360000002 HC RX W HCPCS

## 2024-04-09 PROCEDURE — 2580000003 HC RX 258

## 2024-04-09 PROCEDURE — 70450 CT HEAD/BRAIN W/O DYE: CPT

## 2024-04-09 PROCEDURE — 80053 COMPREHEN METABOLIC PANEL: CPT

## 2024-04-09 PROCEDURE — 87088 URINE BACTERIA CULTURE: CPT

## 2024-04-09 PROCEDURE — 87086 URINE CULTURE/COLONY COUNT: CPT

## 2024-04-09 PROCEDURE — 87154 CUL TYP ID BLD PTHGN 6+ TRGT: CPT

## 2024-04-09 PROCEDURE — 2500000003 HC RX 250 WO HCPCS: Performed by: INTERNAL MEDICINE

## 2024-04-09 PROCEDURE — 96375 TX/PRO/DX INJ NEW DRUG ADDON: CPT

## 2024-04-09 PROCEDURE — 87040 BLOOD CULTURE FOR BACTERIA: CPT

## 2024-04-09 PROCEDURE — 87077 CULTURE AEROBIC IDENTIFY: CPT

## 2024-04-09 PROCEDURE — 83880 ASSAY OF NATRIURETIC PEPTIDE: CPT

## 2024-04-09 PROCEDURE — 2500000003 HC RX 250 WO HCPCS

## 2024-04-09 PROCEDURE — 87502 INFLUENZA DNA AMP PROBE: CPT

## 2024-04-09 PROCEDURE — 2060000000 HC ICU INTERMEDIATE R&B

## 2024-04-09 RX ORDER — LOPERAMIDE HYDROCHLORIDE 2 MG/1
2 CAPSULE ORAL 4 TIMES DAILY PRN
COMMUNITY

## 2024-04-09 RX ORDER — INSULIN LISPRO 100 [IU]/ML
0-4 INJECTION, SOLUTION INTRAVENOUS; SUBCUTANEOUS
Status: DISCONTINUED | OUTPATIENT
Start: 2024-04-09 | End: 2024-04-16 | Stop reason: HOSPADM

## 2024-04-09 RX ORDER — FUROSEMIDE 20 MG/1
20 TABLET ORAL DAILY
Status: DISCONTINUED | OUTPATIENT
Start: 2024-04-09 | End: 2024-04-15

## 2024-04-09 RX ORDER — ONDANSETRON 4 MG/1
4 TABLET, ORALLY DISINTEGRATING ORAL EVERY 8 HOURS PRN
Status: DISCONTINUED | OUTPATIENT
Start: 2024-04-09 | End: 2024-04-16 | Stop reason: HOSPADM

## 2024-04-09 RX ORDER — SODIUM CHLORIDE 0.9 % (FLUSH) 0.9 %
5-40 SYRINGE (ML) INJECTION EVERY 12 HOURS SCHEDULED
Status: DISCONTINUED | OUTPATIENT
Start: 2024-04-09 | End: 2024-04-16 | Stop reason: HOSPADM

## 2024-04-09 RX ORDER — SODIUM CHLORIDE, SODIUM LACTATE, POTASSIUM CHLORIDE, CALCIUM CHLORIDE 600; 310; 30; 20 MG/100ML; MG/100ML; MG/100ML; MG/100ML
INJECTION, SOLUTION INTRAVENOUS CONTINUOUS
Status: DISCONTINUED | OUTPATIENT
Start: 2024-04-09 | End: 2024-04-10

## 2024-04-09 RX ORDER — 0.9 % SODIUM CHLORIDE 0.9 %
1000 INTRAVENOUS SOLUTION INTRAVENOUS ONCE
Status: COMPLETED | OUTPATIENT
Start: 2024-04-09 | End: 2024-04-09

## 2024-04-09 RX ORDER — POLYETHYLENE GLYCOL 3350 17 G/17G
17 POWDER, FOR SOLUTION ORAL DAILY PRN
Status: DISCONTINUED | OUTPATIENT
Start: 2024-04-09 | End: 2024-04-12

## 2024-04-09 RX ORDER — AMMONIUM LACTATE 12 G/100G
LOTION TOPICAL 2 TIMES DAILY PRN
COMMUNITY

## 2024-04-09 RX ORDER — ONDANSETRON 2 MG/ML
4 INJECTION INTRAMUSCULAR; INTRAVENOUS EVERY 6 HOURS PRN
Status: DISCONTINUED | OUTPATIENT
Start: 2024-04-09 | End: 2024-04-16 | Stop reason: HOSPADM

## 2024-04-09 RX ORDER — INSULIN GLARGINE 100 [IU]/ML
5 INJECTION, SOLUTION SUBCUTANEOUS NIGHTLY
Status: DISCONTINUED | OUTPATIENT
Start: 2024-04-09 | End: 2024-04-14

## 2024-04-09 RX ORDER — ACETAMINOPHEN 325 MG/1
650 TABLET ORAL EVERY 6 HOURS PRN
Status: DISCONTINUED | OUTPATIENT
Start: 2024-04-09 | End: 2024-04-16 | Stop reason: HOSPADM

## 2024-04-09 RX ORDER — ENOXAPARIN SODIUM 100 MG/ML
40 INJECTION SUBCUTANEOUS EVERY 24 HOURS
Status: DISCONTINUED | OUTPATIENT
Start: 2024-04-09 | End: 2024-04-16 | Stop reason: HOSPADM

## 2024-04-09 RX ORDER — MONTELUKAST SODIUM 10 MG/1
10 TABLET ORAL NIGHTLY
Status: DISCONTINUED | OUTPATIENT
Start: 2024-04-09 | End: 2024-04-16 | Stop reason: HOSPADM

## 2024-04-09 RX ORDER — DEXTROSE MONOHYDRATE 100 MG/ML
INJECTION, SOLUTION INTRAVENOUS CONTINUOUS PRN
Status: DISCONTINUED | OUTPATIENT
Start: 2024-04-09 | End: 2024-04-16 | Stop reason: HOSPADM

## 2024-04-09 RX ORDER — DOCUSATE SODIUM 100 MG/1
100 CAPSULE, LIQUID FILLED ORAL 2 TIMES DAILY
COMMUNITY

## 2024-04-09 RX ORDER — SENNOSIDES A AND B 8.6 MG/1
1 TABLET, FILM COATED ORAL DAILY PRN
COMMUNITY

## 2024-04-09 RX ORDER — FAMOTIDINE 20 MG/1
20 TABLET, FILM COATED ORAL DAILY
COMMUNITY

## 2024-04-09 RX ORDER — ONDANSETRON 4 MG/1
4 TABLET, FILM COATED ORAL EVERY 6 HOURS PRN
COMMUNITY

## 2024-04-09 RX ORDER — INSULIN LISPRO 100 [IU]/ML
0-4 INJECTION, SOLUTION INTRAVENOUS; SUBCUTANEOUS NIGHTLY
Status: DISCONTINUED | OUTPATIENT
Start: 2024-04-09 | End: 2024-04-16 | Stop reason: HOSPADM

## 2024-04-09 RX ORDER — ACETAMINOPHEN 650 MG/1
650 SUPPOSITORY RECTAL EVERY 6 HOURS PRN
Status: DISCONTINUED | OUTPATIENT
Start: 2024-04-09 | End: 2024-04-16 | Stop reason: HOSPADM

## 2024-04-09 RX ORDER — SODIUM CHLORIDE 9 MG/ML
INJECTION, SOLUTION INTRAVENOUS PRN
Status: DISCONTINUED | OUTPATIENT
Start: 2024-04-09 | End: 2024-04-16 | Stop reason: HOSPADM

## 2024-04-09 RX ORDER — KETOCONAZOLE 20 MG/ML
SHAMPOO TOPICAL
COMMUNITY

## 2024-04-09 RX ORDER — POLYETHYLENE GLYCOL 3350 17 G/17G
17 POWDER, FOR SOLUTION ORAL DAILY
COMMUNITY

## 2024-04-09 RX ORDER — SODIUM CHLORIDE 0.9 % (FLUSH) 0.9 %
5-40 SYRINGE (ML) INJECTION PRN
Status: DISCONTINUED | OUTPATIENT
Start: 2024-04-09 | End: 2024-04-16 | Stop reason: HOSPADM

## 2024-04-09 RX ORDER — ACETAMINOPHEN 325 MG/1
650 TABLET ORAL EVERY 4 HOURS PRN
COMMUNITY

## 2024-04-09 RX ORDER — ACETIC ACID 20.65 MG/ML
5 SOLUTION AURICULAR (OTIC) 3 TIMES DAILY
Status: ON HOLD | COMMUNITY
Start: 2024-04-04 | End: 2024-04-16 | Stop reason: HOSPADM

## 2024-04-09 RX ORDER — GLUCAGON 1 MG/ML
1 KIT INJECTION PRN
Status: DISCONTINUED | OUTPATIENT
Start: 2024-04-09 | End: 2024-04-16 | Stop reason: HOSPADM

## 2024-04-09 RX ORDER — LORATADINE 10 MG/1
10 TABLET ORAL DAILY PRN
COMMUNITY

## 2024-04-09 RX ADMIN — CEFEPIME 2000 MG: 2 INJECTION, POWDER, FOR SOLUTION INTRAVENOUS at 22:04

## 2024-04-09 RX ADMIN — INSULIN LISPRO 4 UNITS: 100 INJECTION, SOLUTION INTRAVENOUS; SUBCUTANEOUS at 20:32

## 2024-04-09 RX ADMIN — DOXYCYCLINE 100 MG: 100 INJECTION, POWDER, LYOPHILIZED, FOR SOLUTION INTRAVENOUS at 12:30

## 2024-04-09 RX ADMIN — CEFTRIAXONE SODIUM 2000 MG: 2 INJECTION, POWDER, FOR SOLUTION INTRAMUSCULAR; INTRAVENOUS at 11:39

## 2024-04-09 RX ADMIN — INSULIN GLARGINE 5 UNITS: 100 INJECTION, SOLUTION SUBCUTANEOUS at 20:32

## 2024-04-09 RX ADMIN — INSULIN LISPRO 1 UNITS: 100 INJECTION, SOLUTION INTRAVENOUS; SUBCUTANEOUS at 17:04

## 2024-04-09 RX ADMIN — CEFEPIME 2000 MG: 2 INJECTION, POWDER, FOR SOLUTION INTRAVENOUS at 15:48

## 2024-04-09 RX ADMIN — MONTELUKAST 10 MG: 10 TABLET, FILM COATED ORAL at 20:35

## 2024-04-09 RX ADMIN — SODIUM CHLORIDE 1000 ML: 9 INJECTION, SOLUTION INTRAVENOUS at 10:02

## 2024-04-09 RX ADMIN — SODIUM CHLORIDE 1000 ML: 9 INJECTION, SOLUTION INTRAVENOUS at 12:36

## 2024-04-09 RX ADMIN — ANTI-FUNGAL POWDER MICONAZOLE NITRATE TALC FREE: 1.42 POWDER TOPICAL at 20:30

## 2024-04-09 RX ADMIN — Medication 10 ML: at 20:35

## 2024-04-09 RX ADMIN — ENOXAPARIN SODIUM 40 MG: 100 INJECTION SUBCUTANEOUS at 17:59

## 2024-04-09 RX ADMIN — VANCOMYCIN HYDROCHLORIDE 750 MG: 5 INJECTION, POWDER, LYOPHILIZED, FOR SOLUTION INTRAVENOUS at 17:01

## 2024-04-09 RX ADMIN — SODIUM CHLORIDE, POTASSIUM CHLORIDE, SODIUM LACTATE AND CALCIUM CHLORIDE: 600; 310; 30; 20 INJECTION, SOLUTION INTRAVENOUS at 15:45

## 2024-04-09 ASSESSMENT — PAIN SCALES - GENERAL: PAINLEVEL_OUTOF10: 0

## 2024-04-09 NOTE — ED PROVIDER NOTES
[facility]  EMERGENCY DEPARTMENT ENCOUNTER        Pt Name: Olga Downey  MRN: [id number]  Birthdate 1955  Date of evaluation: 4/9/2024  Provider: Bernard Elkins DO  PCP: No primary care provider on file.  Note Started: 9:40 AM EDT 4/9/24    CHIEF COMPLAINT       Chief Complaint   Patient presents with    Tachycardia     Patient sent in from [facility] for hypotension and tachycardia. Per EMS on arrival /75 . Received 500mL bolus PTA    Altered Mental Status     Per EMS family states new onset confusion. A&Ox2 during triage       HISTORY OF PRESENT ILLNESS: 1 or more Elements   Olga Downey is a 68 y.o. female who presents to the emergency department with chief complaint of altered mental status and tachycardia.  Per EMS, patient is from nursing facility and family stating that patient seems to be more confused than normal.  Patient is alert and oriented x 2 where she is normally alert and oriented x 4.  Patient was soft blood pressures but not hypotensive for EMS and was tachycardic in the 115's.  Patient not requiring supplemental oxygenation.  Has only received 500 cc bolus of normal saline prior to arrival.  At this time patient complaining of mild abdominal discomfort and does have a foul urine odor on examination.  Otherwise she denies any fever, chills, nausea, vomiting, chest pain, shortness of breath, hematuria or dysuria, constipation or diarrhea.    Nursing Notes were all reviewed and agreed with or any disagreements were addressed in the HPI.    REVIEW OF SYSTEMS :      Positives and Pertinent negatives as per HPI.     PAST MEDICAL HISTORY/Chronic Conditions Affecting Care      has a past medical history of Arthritis, Diabetes, Heart murmur, History of blood transfusion, Leg edema, Osteoarthritis, Rheumatoid arthritis (HCC), and Torticollis.     SURGICAL HISTORY     Past Surgical History:   Procedure Laterality Date    ANKLE FRACTURE SURGERY  2005    L. ankle ORIF    CARPAL TUNNEL

## 2024-04-09 NOTE — ED NOTES
Patient report called to 6th floor at this time. All questions answered, no further concerns from receiving RN.

## 2024-04-09 NOTE — H&P
now    3.  Severe rheumatoid arthritis and torticollis  -PT/OT when clinically appropriate  -Supportive care    4.  Moderate protein calorie malnutrition  -Diabetic oral nutritional supplements    Code Status: Full   DVT prophylaxis: Enoxaparin    Discussed with patient's  and daughter at bedside    NOTE: This report was transcribed using voice recognition software. Every effort was made to ensure accuracy; however, inadvertent computerized transcription errors may be present.     Electronically signed by Santos Strong DO on 4/9/2024 at 5:35 PM

## 2024-04-10 PROBLEM — R78.81 POSITIVE BLOOD CULTURE: Status: ACTIVE | Noted: 2024-04-10

## 2024-04-10 PROBLEM — G93.41 SEPSIS DUE TO ESCHERICHIA COLI WITH ENCEPHALOPATHY WITHOUT SEPTIC SHOCK (HCC): Status: ACTIVE | Noted: 2024-04-10

## 2024-04-10 PROBLEM — A41.51 SEPSIS DUE TO ESCHERICHIA COLI WITH ENCEPHALOPATHY WITHOUT SEPTIC SHOCK (HCC): Status: ACTIVE | Noted: 2024-04-10

## 2024-04-10 PROBLEM — R65.20 SEPSIS DUE TO ESCHERICHIA COLI WITH ENCEPHALOPATHY WITHOUT SEPTIC SHOCK (HCC): Status: ACTIVE | Noted: 2024-04-10

## 2024-04-10 LAB
ALBUMIN SERPL-MCNC: 2.8 G/DL (ref 3.5–5.2)
ALP SERPL-CCNC: 85 U/L (ref 35–104)
ALT SERPL-CCNC: <5 U/L (ref 0–32)
ANION GAP SERPL CALCULATED.3IONS-SCNC: 8 MMOL/L (ref 7–16)
AST SERPL-CCNC: 9 U/L (ref 0–31)
B PARAP IS1001 DNA NPH QL NAA+NON-PROBE: NOT DETECTED
B PERT DNA SPEC QL NAA+PROBE: NOT DETECTED
BASOPHILS # BLD: 0.05 K/UL (ref 0–0.2)
BASOPHILS NFR BLD: 0 % (ref 0–2)
BILIRUB SERPL-MCNC: 0.2 MG/DL (ref 0–1.2)
BUN SERPL-MCNC: 24 MG/DL (ref 6–23)
C PNEUM DNA NPH QL NAA+NON-PROBE: NOT DETECTED
CALCIUM SERPL-MCNC: 8 MG/DL (ref 8.6–10.2)
CHLORIDE SERPL-SCNC: 105 MMOL/L (ref 98–107)
CO2 SERPL-SCNC: 24 MMOL/L (ref 22–29)
CREAT SERPL-MCNC: 1 MG/DL (ref 0.5–1)
EOSINOPHIL # BLD: 0.09 K/UL (ref 0.05–0.5)
EOSINOPHILS RELATIVE PERCENT: 1 % (ref 0–6)
ERYTHROCYTE [DISTWIDTH] IN BLOOD BY AUTOMATED COUNT: 14.9 % (ref 11.5–15)
FLUAV RNA NPH QL NAA+NON-PROBE: NOT DETECTED
FLUBV RNA NPH QL NAA+NON-PROBE: NOT DETECTED
GFR SERPL CREATININE-BSD FRML MDRD: 65 ML/MIN/1.73M2
GLUCOSE BLD-MCNC: 158 MG/DL (ref 74–99)
GLUCOSE BLD-MCNC: 172 MG/DL (ref 74–99)
GLUCOSE BLD-MCNC: 176 MG/DL (ref 74–99)
GLUCOSE BLD-MCNC: 274 MG/DL (ref 74–99)
GLUCOSE SERPL-MCNC: 148 MG/DL (ref 74–99)
HADV DNA NPH QL NAA+NON-PROBE: NOT DETECTED
HCOV 229E RNA NPH QL NAA+NON-PROBE: NOT DETECTED
HCOV HKU1 RNA NPH QL NAA+NON-PROBE: NOT DETECTED
HCOV NL63 RNA NPH QL NAA+NON-PROBE: NOT DETECTED
HCOV OC43 RNA NPH QL NAA+NON-PROBE: NOT DETECTED
HCT VFR BLD AUTO: 29.5 % (ref 34–48)
HGB BLD-MCNC: 9.3 G/DL (ref 11.5–15.5)
HMPV RNA NPH QL NAA+NON-PROBE: NOT DETECTED
HPIV1 RNA NPH QL NAA+NON-PROBE: NOT DETECTED
HPIV2 RNA NPH QL NAA+NON-PROBE: NOT DETECTED
HPIV3 RNA NPH QL NAA+NON-PROBE: NOT DETECTED
HPIV4 RNA NPH QL NAA+NON-PROBE: NOT DETECTED
IMM GRANULOCYTES # BLD AUTO: 0.19 K/UL (ref 0–0.58)
IMM GRANULOCYTES NFR BLD: 1 % (ref 0–5)
L PNEUMO1 AG UR QL IA.RAPID: NEGATIVE
LYMPHOCYTES NFR BLD: 1.23 K/UL (ref 1.5–4)
LYMPHOCYTES RELATIVE PERCENT: 8 % (ref 20–42)
M PNEUMO DNA NPH QL NAA+NON-PROBE: NOT DETECTED
MAGNESIUM SERPL-MCNC: 1.7 MG/DL (ref 1.6–2.6)
MCH RBC QN AUTO: 28.1 PG (ref 26–35)
MCHC RBC AUTO-ENTMCNC: 31.5 G/DL (ref 32–34.5)
MCV RBC AUTO: 89.1 FL (ref 80–99.9)
MONOCYTES NFR BLD: 1.38 K/UL (ref 0.1–0.95)
MONOCYTES NFR BLD: 9 % (ref 2–12)
NEUTROPHILS NFR BLD: 82 % (ref 43–80)
NEUTS SEG NFR BLD: 13.32 K/UL (ref 1.8–7.3)
PHOSPHATE SERPL-MCNC: 2.5 MG/DL (ref 2.5–4.5)
PLATELET # BLD AUTO: 208 K/UL (ref 130–450)
PMV BLD AUTO: 10.3 FL (ref 7–12)
POTASSIUM SERPL-SCNC: 3.7 MMOL/L (ref 3.5–5)
PROT SERPL-MCNC: 6.3 G/DL (ref 6.4–8.3)
RBC # BLD AUTO: 3.31 M/UL (ref 3.5–5.5)
RSV RNA NPH QL NAA+NON-PROBE: NOT DETECTED
RV+EV RNA NPH QL NAA+NON-PROBE: NOT DETECTED
S PNEUM AG SPEC QL: NEGATIVE
SARS-COV-2 RNA NPH QL NAA+NON-PROBE: NOT DETECTED
SODIUM SERPL-SCNC: 137 MMOL/L (ref 132–146)
SPECIMEN DESCRIPTION: NORMAL
SPECIMEN SOURCE: NORMAL
WBC OTHER # BLD: 16.3 K/UL (ref 4.5–11.5)

## 2024-04-10 PROCEDURE — 85025 COMPLETE CBC W/AUTO DIFF WBC: CPT

## 2024-04-10 PROCEDURE — 6360000002 HC RX W HCPCS: Performed by: INTERNAL MEDICINE

## 2024-04-10 PROCEDURE — 2580000003 HC RX 258: Performed by: INTERNAL MEDICINE

## 2024-04-10 PROCEDURE — 83735 ASSAY OF MAGNESIUM: CPT

## 2024-04-10 PROCEDURE — 99233 SBSQ HOSP IP/OBS HIGH 50: CPT | Performed by: INTERNAL MEDICINE

## 2024-04-10 PROCEDURE — 80053 COMPREHEN METABOLIC PANEL: CPT

## 2024-04-10 PROCEDURE — 82962 GLUCOSE BLOOD TEST: CPT

## 2024-04-10 PROCEDURE — 36415 COLL VENOUS BLD VENIPUNCTURE: CPT

## 2024-04-10 PROCEDURE — 2060000000 HC ICU INTERMEDIATE R&B

## 2024-04-10 PROCEDURE — 84100 ASSAY OF PHOSPHORUS: CPT

## 2024-04-10 PROCEDURE — 6370000000 HC RX 637 (ALT 250 FOR IP): Performed by: INTERNAL MEDICINE

## 2024-04-10 RX ADMIN — CEFEPIME 2000 MG: 2 INJECTION, POWDER, FOR SOLUTION INTRAVENOUS at 22:12

## 2024-04-10 RX ADMIN — INSULIN GLARGINE 5 UNITS: 100 INJECTION, SOLUTION SUBCUTANEOUS at 20:50

## 2024-04-10 RX ADMIN — ANTI-FUNGAL POWDER MICONAZOLE NITRATE TALC FREE: 1.42 POWDER TOPICAL at 20:50

## 2024-04-10 RX ADMIN — SODIUM CHLORIDE, POTASSIUM CHLORIDE, SODIUM LACTATE AND CALCIUM CHLORIDE: 600; 310; 30; 20 INJECTION, SOLUTION INTRAVENOUS at 02:52

## 2024-04-10 RX ADMIN — ANTI-FUNGAL POWDER MICONAZOLE NITRATE TALC FREE: 1.42 POWDER TOPICAL at 08:23

## 2024-04-10 RX ADMIN — Medication 10 ML: at 08:23

## 2024-04-10 RX ADMIN — CEFEPIME 2000 MG: 2 INJECTION, POWDER, FOR SOLUTION INTRAVENOUS at 06:42

## 2024-04-10 RX ADMIN — VANCOMYCIN HYDROCHLORIDE 750 MG: 5 INJECTION, POWDER, LYOPHILIZED, FOR SOLUTION INTRAVENOUS at 15:35

## 2024-04-10 RX ADMIN — CEFEPIME 2000 MG: 2 INJECTION, POWDER, FOR SOLUTION INTRAVENOUS at 15:31

## 2024-04-10 RX ADMIN — VANCOMYCIN HYDROCHLORIDE 750 MG: 5 INJECTION, POWDER, LYOPHILIZED, FOR SOLUTION INTRAVENOUS at 03:37

## 2024-04-10 RX ADMIN — ENOXAPARIN SODIUM 40 MG: 100 INJECTION SUBCUTANEOUS at 16:46

## 2024-04-10 RX ADMIN — Medication 10 ML: at 20:50

## 2024-04-10 RX ADMIN — MONTELUKAST 10 MG: 10 TABLET, FILM COATED ORAL at 20:50

## 2024-04-10 ASSESSMENT — PAIN SCALES - GENERAL
PAINLEVEL_OUTOF10: 0
PAINLEVEL_OUTOF10: 0

## 2024-04-10 NOTE — PLAN OF CARE
Problem: Discharge Planning  Goal: Discharge to home or other facility with appropriate resources  4/10/2024 1031 by Dinh Marcelino RN  Outcome: Progressing  4/10/2024 1031 by Dinh Marcelino RN  Outcome: Progressing  Flowsheets (Taken 4/10/2024 0800)  Discharge to home or other facility with appropriate resources: Identify barriers to discharge with patient and caregiver  4/10/2024 0100 by Kirill Hirsch RN  Outcome: Progressing     Problem: Skin/Tissue Integrity  Goal: Absence of new skin breakdown  Description: 1.  Monitor for areas of redness and/or skin breakdown  2.  Assess vascular access sites hourly  3.  Every 4-6 hours minimum:  Change oxygen saturation probe site  4.  Every 4-6 hours:  If on nasal continuous positive airway pressure, respiratory therapy assess nares and determine need for appliance change or resting period.  4/10/2024 1031 by Dinh Marcelino RN  Outcome: Progressing  4/10/2024 1031 by Dinh Marcelino RN  Outcome: Progressing  4/10/2024 0100 by Kirill Hirsch RN  Outcome: Progressing     Problem: Safety - Adult  Goal: Free from fall injury  4/10/2024 1031 by Dinh Marcelino RN  Outcome: Progressing  4/10/2024 1031 by Dinh Marcelino RN  Outcome: Progressing  4/10/2024 0100 by Kirill Hirsch RN  Outcome: Progressing     Problem: Pain  Goal: Verbalizes/displays adequate comfort level or baseline comfort level  4/10/2024 1031 by Dinh Marcelino RN  Outcome: Progressing  4/10/2024 0100 by Kirill Hirsch RN  Outcome: Progressing

## 2024-04-10 NOTE — DISCHARGE INSTR - COC
3000 or equivalent transparent dressing.  Change dressing twice weekly, maintaining sterile technique. If there is a BioPatch®, SilverSite® or equivalent, change once weekly only or as needed.    FOLLOW-UP VISITS  Nursing staff should call the office during business hours to schedule a follow-up appointment once the patient is admitted to the service or facility. Every effort should be made to have patient follow-up within 2 weeks of discharge.  Continue IV antibiotic therapy until patient is seen in the office or unless specific stop date is noted on the original order or unless otherwise ordered by physician.  Call office to ensure stop date is correct before stopping antibiotics.         Claudia Mahoney MD  Follow-up with ID in 2 weeks  Call office for appt [phone]

## 2024-04-10 NOTE — CARE COORDINATION
Case Management Assessment  Initial Evaluation    Date/Time of Evaluation: 4/10/2024 12:47 PM  Assessment Completed by: JEAN CARLOS Ornelas    If patient is discharged prior to next notation, then this note serves as note for discharge by case management.    Patient Name: Olga Downey                   YOB: 1955  Diagnosis: Sepsis due to pneumonia (HCC) [J18.9, A41.9]                   Date / Time: 4/9/2024  9:28 AM    Patient Admission Status: Inpatient   Readmission Risk (Low < 19, Mod (19-27), High > 27): Readmission Risk Score: 14.3    Current PCP: No primary care provider on file.  PCP verified by CM? Yes    Chart Reviewed: Yes      History Provided by: Patient, Medical Record, Other (see comment) (SNF rep , [place])  Patient Orientation: Alert and Oriented, Place, Person, Situation    Patient Cognition: Alert    Hospitalization in the last 30 days (Readmission):  No    If yes, Readmission Assessment in CM Navigator will be completed.    Advance Directives:      Code Status: Full Code   Patient's Primary Decision Maker is: Legal Next of Kin      Discharge Planning:    Patient lives with: Other (Comment) Type of Home: Long-Term Care  Primary Care Giver:    Patient Support Systems include: Spouse/Significant Other   Current Financial resources:    Current community resources:    Current services prior to admission: Skilled Nursing Facility            Current DME:              Type of Home Care services:  None    ADLS  Prior functional level: Assistance with the following:, Bathing, Dressing, Toileting, Cooking, Housework, Shopping, Mobility  Current functional level: Mobility, Shopping, Housework, Cooking, Toileting, Dressing, Bathing, Assistance with the following:    PT AM-PAC:   /24  OT AM-PAC:   /24    Family can provide assistance at DC: No  Would you like Case Management to discuss the discharge plan with any other family members/significant others, and if so, who? Yes (spouse)  Plans to Return

## 2024-04-11 LAB
GLUCOSE BLD-MCNC: 158 MG/DL (ref 74–99)
GLUCOSE BLD-MCNC: 159 MG/DL (ref 74–99)
GLUCOSE BLD-MCNC: 237 MG/DL (ref 74–99)
GLUCOSE BLD-MCNC: 280 MG/DL (ref 74–99)
MICROORGANISM SPEC CULT: ABNORMAL
SPECIMEN DESCRIPTION: ABNORMAL

## 2024-04-11 PROCEDURE — 97110 THERAPEUTIC EXERCISES: CPT | Performed by: PHYSICAL THERAPIST

## 2024-04-11 PROCEDURE — 99233 SBSQ HOSP IP/OBS HIGH 50: CPT | Performed by: INTERNAL MEDICINE

## 2024-04-11 PROCEDURE — 97161 PT EVAL LOW COMPLEX 20 MIN: CPT | Performed by: PHYSICAL THERAPIST

## 2024-04-11 PROCEDURE — 2580000003 HC RX 258: Performed by: INTERNAL MEDICINE

## 2024-04-11 PROCEDURE — 6360000002 HC RX W HCPCS: Performed by: INTERNAL MEDICINE

## 2024-04-11 PROCEDURE — 2580000003 HC RX 258: Performed by: FAMILY MEDICINE

## 2024-04-11 PROCEDURE — 82962 GLUCOSE BLOOD TEST: CPT

## 2024-04-11 PROCEDURE — 2500000003 HC RX 250 WO HCPCS: Performed by: FAMILY MEDICINE

## 2024-04-11 PROCEDURE — 2060000000 HC ICU INTERMEDIATE R&B

## 2024-04-11 PROCEDURE — 6370000000 HC RX 637 (ALT 250 FOR IP): Performed by: INTERNAL MEDICINE

## 2024-04-11 PROCEDURE — 97165 OT EVAL LOW COMPLEX 30 MIN: CPT

## 2024-04-11 PROCEDURE — 93005 ELECTROCARDIOGRAM TRACING: CPT | Performed by: FAMILY MEDICINE

## 2024-04-11 RX ORDER — 0.9 % SODIUM CHLORIDE 0.9 %
500 INTRAVENOUS SOLUTION INTRAVENOUS ONCE
Status: COMPLETED | OUTPATIENT
Start: 2024-04-11 | End: 2024-04-12

## 2024-04-11 RX ORDER — METOPROLOL TARTRATE 1 MG/ML
5 INJECTION, SOLUTION INTRAVENOUS ONCE
Status: COMPLETED | OUTPATIENT
Start: 2024-04-11 | End: 2024-04-11

## 2024-04-11 RX ORDER — MIDODRINE HYDROCHLORIDE 5 MG/1
5 TABLET ORAL
Status: DISCONTINUED | OUTPATIENT
Start: 2024-04-11 | End: 2024-04-12

## 2024-04-11 RX ADMIN — METOPROLOL TARTRATE 5 MG: 5 INJECTION INTRAVENOUS at 23:30

## 2024-04-11 RX ADMIN — MEROPENEM 1000 MG: 1 INJECTION, POWDER, FOR SOLUTION INTRAVENOUS at 11:19

## 2024-04-11 RX ADMIN — MIDODRINE HYDROCHLORIDE 5 MG: 5 TABLET ORAL at 18:06

## 2024-04-11 RX ADMIN — INSULIN LISPRO 1 UNITS: 100 INJECTION, SOLUTION INTRAVENOUS; SUBCUTANEOUS at 11:22

## 2024-04-11 RX ADMIN — ACETAMINOPHEN 650 MG: 325 TABLET ORAL at 09:11

## 2024-04-11 RX ADMIN — CEFEPIME 2000 MG: 2 INJECTION, POWDER, FOR SOLUTION INTRAVENOUS at 06:19

## 2024-04-11 RX ADMIN — VANCOMYCIN HYDROCHLORIDE 750 MG: 5 INJECTION, POWDER, LYOPHILIZED, FOR SOLUTION INTRAVENOUS at 03:09

## 2024-04-11 RX ADMIN — Medication 10 ML: at 08:48

## 2024-04-11 RX ADMIN — ANTI-FUNGAL POWDER MICONAZOLE NITRATE TALC FREE: 1.42 POWDER TOPICAL at 08:48

## 2024-04-11 RX ADMIN — ANTI-FUNGAL POWDER MICONAZOLE NITRATE TALC FREE: 1.42 POWDER TOPICAL at 20:20

## 2024-04-11 RX ADMIN — ENOXAPARIN SODIUM 40 MG: 100 INJECTION SUBCUTANEOUS at 18:05

## 2024-04-11 RX ADMIN — INSULIN LISPRO 2 UNITS: 100 INJECTION, SOLUTION INTRAVENOUS; SUBCUTANEOUS at 18:05

## 2024-04-11 RX ADMIN — INSULIN GLARGINE 5 UNITS: 100 INJECTION, SOLUTION SUBCUTANEOUS at 20:23

## 2024-04-11 RX ADMIN — MONTELUKAST 10 MG: 10 TABLET, FILM COATED ORAL at 20:20

## 2024-04-11 RX ADMIN — SODIUM CHLORIDE 500 ML: 9 INJECTION, SOLUTION INTRAVENOUS at 23:32

## 2024-04-11 RX ADMIN — MEROPENEM 1000 MG: 1 INJECTION, POWDER, FOR SOLUTION INTRAVENOUS at 17:45

## 2024-04-11 RX ADMIN — Medication 10 ML: at 20:20

## 2024-04-11 ASSESSMENT — PAIN SCALES - GENERAL: PAINLEVEL_OUTOF10: 7

## 2024-04-11 ASSESSMENT — PAIN DESCRIPTION - LOCATION: LOCATION: ABDOMEN

## 2024-04-11 ASSESSMENT — PAIN DESCRIPTION - ORIENTATION: ORIENTATION: LEFT;UPPER

## 2024-04-11 NOTE — CARE COORDINATION
SOCIAL WORK / DISCHARGE PLANNING:  Dc plan will return to [facility] at [place], [place] [phone], Fax [phone], intermediate LOC. NO PRECERT needed. IV Merrem currently, if will need at dc, will need IV access placed. KASEY will need signed by physician.          Electronically signed by JEAN CARLOS Ornelas on 4/11/2024 at 1:47 PM

## 2024-04-11 NOTE — PLAN OF CARE
Problem: Discharge Planning  Goal: Discharge to home or other facility with appropriate resources  4/11/2024 0833 by Chandler Fink RN  Outcome: Progressing  Flowsheets (Taken 4/11/2024 0745)  Discharge to home or other facility with appropriate resources:   Identify barriers to discharge with patient and caregiver   Arrange for needed discharge resources and transportation as appropriate  4/11/2024 0129 by Kirill Hirsch RN  Outcome: Progressing     Problem: Skin/Tissue Integrity  Goal: Absence of new skin breakdown  Description: 1.  Monitor for areas of redness and/or skin breakdown  2.  Assess vascular access sites hourly  3.  Every 4-6 hours minimum:  Change oxygen saturation probe site  4.  Every 4-6 hours:  If on nasal continuous positive airway pressure, respiratory therapy assess nares and determine need for appliance change or resting period.  4/11/2024 0833 by Chandler Fink RN  Outcome: Progressing  4/11/2024 0129 by Kirill Hirsch RN  Outcome: Progressing     Problem: Safety - Adult  Goal: Free from fall injury  4/11/2024 0833 by Chandler Fink RN  Outcome: Progressing  4/11/2024 0129 by Kirill Hirsch RN  Outcome: Progressing     Problem: Pain  Goal: Verbalizes/displays adequate comfort level or baseline comfort level  4/11/2024 0833 by Chandler Fink RN  Outcome: Progressing  4/11/2024 0129 by Kirill Hirsch RN  Outcome: Progressing     Problem: Chronic Conditions and Co-morbidities  Goal: Patient's chronic conditions and co-morbidity symptoms are monitored and maintained or improved  4/11/2024 0833 by Chandler Fink RN  Outcome: Progressing  Flowsheets (Taken 4/11/2024 0745)  Care Plan - Patient's Chronic Conditions and Co-Morbidity Symptoms are Monitored and Maintained or Improved:   Monitor and assess patient's chronic conditions and comorbid symptoms for stability, deterioration, or improvement   Collaborate with multidisciplinary team to address chronic and comorbid conditions and prevent

## 2024-04-11 NOTE — ACP (ADVANCE CARE PLANNING)
Advance Care Planning   Healthcare Decision Maker:    Primary Decision Maker: Bharath Downey - [facility] - [phone]    Click here to complete Healthcare Decision Makers including selection of the Healthcare Decision Maker Relationship (ie \"Primary\").  Today we documented Decision Maker(s) consistent with Legal Next of Kin hierarchy.

## 2024-04-12 LAB
ACB COMPLEX DNA BLD POS QL NAA+NON-PROBE: NOT DETECTED
ALBUMIN SERPL-MCNC: 2.8 G/DL (ref 3.5–5.2)
ALP SERPL-CCNC: 99 U/L (ref 35–104)
ALT SERPL-CCNC: 8 U/L (ref 0–32)
ANION GAP SERPL CALCULATED.3IONS-SCNC: 11 MMOL/L (ref 7–16)
AST SERPL-CCNC: 9 U/L (ref 0–31)
B FRAGILIS DNA BLD POS QL NAA+NON-PROBE: NOT DETECTED
BASOPHILS # BLD: 0.02 K/UL (ref 0–0.2)
BASOPHILS NFR BLD: 0 % (ref 0–2)
BILIRUB SERPL-MCNC: 0.2 MG/DL (ref 0–1.2)
BIOFIRE TEST COMMENT: ABNORMAL
BUN SERPL-MCNC: 17 MG/DL (ref 6–23)
C ALBICANS DNA BLD POS QL NAA+NON-PROBE: NOT DETECTED
C AURIS DNA BLD POS QL NAA+NON-PROBE: NOT DETECTED
C GATTII+NEOFOR DNA BLD POS QL NAA+N-PRB: NOT DETECTED
C GLABRATA DNA BLD POS QL NAA+NON-PROBE: NOT DETECTED
C KRUSEI DNA BLD POS QL NAA+NON-PROBE: NOT DETECTED
C PARAP DNA BLD POS QL NAA+NON-PROBE: NOT DETECTED
C TROPICLS DNA BLD POS QL NAA+NON-PROBE: NOT DETECTED
CALCIUM SERPL-MCNC: 8.4 MG/DL (ref 8.6–10.2)
CHLORIDE SERPL-SCNC: 108 MMOL/L (ref 98–107)
CO2 SERPL-SCNC: 21 MMOL/L (ref 22–29)
CREAT SERPL-MCNC: 0.8 MG/DL (ref 0.5–1)
E CLOAC COMP DNA BLD POS NAA+NON-PROBE: NOT DETECTED
E COLI DNA BLD POS QL NAA+NON-PROBE: NOT DETECTED
E FAECALIS DNA BLD POS QL NAA+NON-PROBE: NOT DETECTED
E FAECIUM DNA BLD POS QL NAA+NON-PROBE: NOT DETECTED
EKG ATRIAL RATE: 159 BPM
EKG Q-T INTERVAL: 286 MS
EKG QRS DURATION: 74 MS
EKG QTC CALCULATION (BAZETT): 468 MS
EKG R AXIS: -43 DEGREES
EKG T AXIS: -8 DEGREES
EKG VENTRICULAR RATE: 161 BPM
ENTEROBACTERALES DNA BLD POS NAA+N-PRB: NOT DETECTED
EOSINOPHIL # BLD: 0.29 K/UL (ref 0.05–0.5)
EOSINOPHILS RELATIVE PERCENT: 3 % (ref 0–6)
ERYTHROCYTE [DISTWIDTH] IN BLOOD BY AUTOMATED COUNT: 15 % (ref 11.5–15)
GFR SERPL CREATININE-BSD FRML MDRD: 77 ML/MIN/1.73M2
GLUCOSE BLD-MCNC: 145 MG/DL (ref 74–99)
GLUCOSE BLD-MCNC: 159 MG/DL (ref 74–99)
GLUCOSE BLD-MCNC: 165 MG/DL (ref 74–99)
GLUCOSE BLD-MCNC: 179 MG/DL (ref 74–99)
GLUCOSE SERPL-MCNC: 147 MG/DL (ref 74–99)
GP B STREP DNA BLD POS QL NAA+NON-PROBE: NOT DETECTED
HAEM INFLU DNA BLD POS QL NAA+NON-PROBE: NOT DETECTED
HCT VFR BLD AUTO: 29.8 % (ref 34–48)
HGB BLD-MCNC: 9.8 G/DL (ref 11.5–15.5)
IMM GRANULOCYTES # BLD AUTO: 0.04 K/UL (ref 0–0.58)
IMM GRANULOCYTES NFR BLD: 0 % (ref 0–5)
K OXYTOCA DNA BLD POS QL NAA+NON-PROBE: NOT DETECTED
KLEBSIELLA SP DNA BLD POS QL NAA+NON-PRB: NOT DETECTED
KLEBSIELLA SP DNA BLD POS QL NAA+NON-PRB: NOT DETECTED
L MONOCYTOG DNA BLD POS QL NAA+NON-PROBE: NOT DETECTED
LYMPHOCYTES NFR BLD: 0.98 K/UL (ref 1.5–4)
LYMPHOCYTES RELATIVE PERCENT: 10 % (ref 20–42)
MCH RBC QN AUTO: 28.6 PG (ref 26–35)
MCHC RBC AUTO-ENTMCNC: 32.9 G/DL (ref 32–34.5)
MCV RBC AUTO: 86.9 FL (ref 80–99.9)
MICROORGANISM SPEC CULT: ABNORMAL
MICROORGANISM/AGENT SPEC: ABNORMAL
MONOCYTES NFR BLD: 0.94 K/UL (ref 0.1–0.95)
MONOCYTES NFR BLD: 10 % (ref 2–12)
N MEN DNA BLD POS QL NAA+NON-PROBE: NOT DETECTED
NEUTROPHILS NFR BLD: 76 % (ref 43–80)
NEUTS SEG NFR BLD: 7.15 K/UL (ref 1.8–7.3)
P AERUGINOSA DNA BLD POS NAA+NON-PROBE: NOT DETECTED
PLATELET # BLD AUTO: 231 K/UL (ref 130–450)
PMV BLD AUTO: 10.6 FL (ref 7–12)
POTASSIUM SERPL-SCNC: 3.9 MMOL/L (ref 3.5–5)
PROT SERPL-MCNC: 6.7 G/DL (ref 6.4–8.3)
PROTEUS SP DNA BLD POS QL NAA+NON-PROBE: NOT DETECTED
RBC # BLD AUTO: 3.43 M/UL (ref 3.5–5.5)
S AUREUS DNA BLD POS QL NAA+NON-PROBE: NOT DETECTED
S AUREUS+CONS DNA BLD POS NAA+NON-PROBE: NOT DETECTED
S EPIDERMIDIS DNA BLD POS QL NAA+NON-PRB: NOT DETECTED
S LUGDUNENSIS DNA BLD POS QL NAA+NON-PRB: NOT DETECTED
S MALTOPHILIA DNA BLD POS QL NAA+NON-PRB: NOT DETECTED
S MARCESCENS DNA BLD POS NAA+NON-PROBE: NOT DETECTED
S PNEUM DNA BLD POS QL NAA+NON-PROBE: NOT DETECTED
S PYO DNA BLD POS QL NAA+NON-PROBE: NOT DETECTED
SALMONELLA DNA BLD POS QL NAA+NON-PROBE: NOT DETECTED
SERVICE CMNT-IMP: ABNORMAL
SODIUM SERPL-SCNC: 140 MMOL/L (ref 132–146)
SPECIMEN DESCRIPTION: ABNORMAL
STREPTOCOCCUS DNA BLD POS NAA+NON-PROBE: NOT DETECTED
WBC OTHER # BLD: 9.4 K/UL (ref 4.5–11.5)

## 2024-04-12 PROCEDURE — 6370000000 HC RX 637 (ALT 250 FOR IP): Performed by: INTERNAL MEDICINE

## 2024-04-12 PROCEDURE — 05HD33Z INSERTION OF INFUSION DEVICE INTO RIGHT CEPHALIC VEIN, PERCUTANEOUS APPROACH: ICD-10-PCS | Performed by: INTERNAL MEDICINE

## 2024-04-12 PROCEDURE — 93010 ELECTROCARDIOGRAM REPORT: CPT | Performed by: INTERNAL MEDICINE

## 2024-04-12 PROCEDURE — 2500000003 HC RX 250 WO HCPCS: Performed by: FAMILY MEDICINE

## 2024-04-12 PROCEDURE — 99232 SBSQ HOSP IP/OBS MODERATE 35: CPT | Performed by: INTERNAL MEDICINE

## 2024-04-12 PROCEDURE — 51798 US URINE CAPACITY MEASURE: CPT

## 2024-04-12 PROCEDURE — 82962 GLUCOSE BLOOD TEST: CPT

## 2024-04-12 PROCEDURE — 2580000003 HC RX 258: Performed by: FAMILY MEDICINE

## 2024-04-12 PROCEDURE — 2500000003 HC RX 250 WO HCPCS: Performed by: CLINICAL NURSE SPECIALIST

## 2024-04-12 PROCEDURE — 76937 US GUIDE VASCULAR ACCESS: CPT

## 2024-04-12 PROCEDURE — 6360000002 HC RX W HCPCS: Performed by: CLINICAL NURSE SPECIALIST

## 2024-04-12 PROCEDURE — 85025 COMPLETE CBC W/AUTO DIFF WBC: CPT

## 2024-04-12 PROCEDURE — 2060000000 HC ICU INTERMEDIATE R&B

## 2024-04-12 PROCEDURE — 6360000002 HC RX W HCPCS: Performed by: INTERNAL MEDICINE

## 2024-04-12 PROCEDURE — 80053 COMPREHEN METABOLIC PANEL: CPT

## 2024-04-12 PROCEDURE — 36415 COLL VENOUS BLD VENIPUNCTURE: CPT

## 2024-04-12 PROCEDURE — 2580000003 HC RX 258: Performed by: CLINICAL NURSE SPECIALIST

## 2024-04-12 PROCEDURE — C1751 CATH, INF, PER/CENT/MIDLINE: HCPCS

## 2024-04-12 PROCEDURE — 2580000003 HC RX 258: Performed by: INTERNAL MEDICINE

## 2024-04-12 PROCEDURE — 36410 VNPNXR 3YR/> PHY/QHP DX/THER: CPT

## 2024-04-12 RX ORDER — MIDODRINE HYDROCHLORIDE 5 MG/1
2.5 TABLET ORAL
Status: DISCONTINUED | OUTPATIENT
Start: 2024-04-12 | End: 2024-04-16 | Stop reason: HOSPADM

## 2024-04-12 RX ORDER — HEPARIN 100 UNIT/ML
1 SYRINGE INTRAVENOUS EVERY 12 HOURS SCHEDULED
Status: DISCONTINUED | OUTPATIENT
Start: 2024-04-12 | End: 2024-04-16 | Stop reason: HOSPADM

## 2024-04-12 RX ORDER — SODIUM CHLORIDE 9 MG/ML
INJECTION, SOLUTION INTRAVENOUS PRN
Status: DISCONTINUED | OUTPATIENT
Start: 2024-04-12 | End: 2024-04-16 | Stop reason: HOSPADM

## 2024-04-12 RX ORDER — HEPARIN 100 UNIT/ML
1 SYRINGE INTRAVENOUS PRN
Status: DISCONTINUED | OUTPATIENT
Start: 2024-04-12 | End: 2024-04-16 | Stop reason: HOSPADM

## 2024-04-12 RX ORDER — SODIUM CHLORIDE 0.9 % (FLUSH) 0.9 %
5-40 SYRINGE (ML) INJECTION PRN
Status: DISCONTINUED | OUTPATIENT
Start: 2024-04-12 | End: 2024-04-16 | Stop reason: HOSPADM

## 2024-04-12 RX ORDER — SODIUM CHLORIDE 0.9 % (FLUSH) 0.9 %
5-40 SYRINGE (ML) INJECTION EVERY 12 HOURS SCHEDULED
Status: DISCONTINUED | OUTPATIENT
Start: 2024-04-12 | End: 2024-04-16 | Stop reason: HOSPADM

## 2024-04-12 RX ORDER — POLYETHYLENE GLYCOL 3350 17 G/17G
17 POWDER, FOR SOLUTION ORAL DAILY
Status: DISCONTINUED | OUTPATIENT
Start: 2024-04-12 | End: 2024-04-16 | Stop reason: HOSPADM

## 2024-04-12 RX ORDER — DILTIAZEM HYDROCHLORIDE 5 MG/ML
10 INJECTION INTRAVENOUS ONCE
Status: COMPLETED | OUTPATIENT
Start: 2024-04-12 | End: 2024-04-12

## 2024-04-12 RX ORDER — DOCUSATE SODIUM 100 MG/1
100 CAPSULE, LIQUID FILLED ORAL DAILY
Status: DISCONTINUED | OUTPATIENT
Start: 2024-04-12 | End: 2024-04-16 | Stop reason: HOSPADM

## 2024-04-12 RX ADMIN — MIDODRINE HYDROCHLORIDE 5 MG: 5 TABLET ORAL at 12:07

## 2024-04-12 RX ADMIN — MIDODRINE HYDROCHLORIDE 2.5 MG: 5 TABLET ORAL at 17:09

## 2024-04-12 RX ADMIN — DOCUSATE SODIUM 100 MG: 100 CAPSULE, LIQUID FILLED ORAL at 12:07

## 2024-04-12 RX ADMIN — DILTIAZEM HYDROCHLORIDE 5 MG/HR: 5 INJECTION INTRAVENOUS at 00:23

## 2024-04-12 RX ADMIN — ENOXAPARIN SODIUM 40 MG: 100 INJECTION SUBCUTANEOUS at 17:09

## 2024-04-12 RX ADMIN — ACETAMINOPHEN 650 MG: 325 TABLET ORAL at 20:41

## 2024-04-12 RX ADMIN — ANTI-FUNGAL POWDER MICONAZOLE NITRATE TALC FREE: 1.42 POWDER TOPICAL at 20:42

## 2024-04-12 RX ADMIN — MONTELUKAST 10 MG: 10 TABLET, FILM COATED ORAL at 20:41

## 2024-04-12 RX ADMIN — MEROPENEM 1000 MG: 1 INJECTION, POWDER, FOR SOLUTION INTRAVENOUS at 17:14

## 2024-04-12 RX ADMIN — Medication 10 ML: at 09:40

## 2024-04-12 RX ADMIN — MEROPENEM 1000 MG: 1 INJECTION, POWDER, FOR SOLUTION INTRAVENOUS at 09:39

## 2024-04-12 RX ADMIN — POLYETHYLENE GLYCOL 3350 17 G: 17 POWDER, FOR SOLUTION ORAL at 12:07

## 2024-04-12 RX ADMIN — MIDODRINE HYDROCHLORIDE 5 MG: 5 TABLET ORAL at 09:34

## 2024-04-12 RX ADMIN — ANTI-FUNGAL POWDER MICONAZOLE NITRATE TALC FREE: 1.42 POWDER TOPICAL at 09:40

## 2024-04-12 RX ADMIN — MEROPENEM 1000 MG: 1 INJECTION, POWDER, FOR SOLUTION INTRAVENOUS at 01:30

## 2024-04-12 RX ADMIN — Medication 10 ML: at 20:42

## 2024-04-12 RX ADMIN — HEPARIN 100 UNITS: 100 SYRINGE at 20:41

## 2024-04-12 RX ADMIN — INSULIN GLARGINE 5 UNITS: 100 INJECTION, SOLUTION SUBCUTANEOUS at 20:41

## 2024-04-12 RX ADMIN — DILTIAZEM HYDROCHLORIDE 10 MG: 5 INJECTION, SOLUTION INTRAVENOUS at 00:20

## 2024-04-12 RX ADMIN — LIDOCAINE HYDROCHLORIDE 5 ML: 10 INJECTION, SOLUTION INFILTRATION; PERINEURAL at 16:10

## 2024-04-12 ASSESSMENT — PAIN DESCRIPTION - LOCATION: LOCATION: HAND

## 2024-04-12 ASSESSMENT — PAIN - FUNCTIONAL ASSESSMENT: PAIN_FUNCTIONAL_ASSESSMENT: ACTIVITIES ARE NOT PREVENTED

## 2024-04-12 ASSESSMENT — PAIN DESCRIPTION - ORIENTATION: ORIENTATION: RIGHT;LEFT

## 2024-04-12 ASSESSMENT — PAIN DESCRIPTION - DESCRIPTORS: DESCRIPTORS: ACHING

## 2024-04-12 ASSESSMENT — PAIN SCALES - GENERAL: PAINLEVEL_OUTOF10: 3

## 2024-04-12 NOTE — PROCEDURES
SL power midline Placement 4/12/2024        Product number: Formerly named [facility] [id number]   Lot Number: [id number]   Consult: home abx   Ultrasound: yes   Right Cephalic vein:                Upper Arm Circumference: (CM) 33    Size:(FR)/GUAGE 4.5    Exposed Length: (CM) 1    Internal Length: (CM) 14   Cut: (CM) 0   Vein Measurement: 0.5cm              Lidocaine Given: yes    Tolerated well  Brisk blood return  Flushed well and capped  External pressure dressing applied d/t bleeding  RN notified      Dario Chaudhary RN  4/12/2024  4:17 PM

## 2024-04-12 NOTE — PLAN OF CARE
Problem: Discharge Planning  Goal: Discharge to home or other facility with appropriate resources  Outcome: Progressing     Problem: Skin/Tissue Integrity  Goal: Absence of new skin breakdown  Description: 1.  Monitor for areas of redness and/or skin breakdown  2.  Assess vascular access sites hourly  3.  Every 4-6 hours minimum:  Change oxygen saturation probe site  4.  Every 4-6 hours:  If on nasal continuous positive airway pressure, respiratory therapy assess nares and determine need for appliance change or resting period.  Outcome: Progressing     Problem: Safety - Adult  Goal: Free from fall injury  Outcome: Progressing     Problem: Pain  Goal: Verbalizes/displays adequate comfort level or baseline comfort level  Outcome: Progressing     Problem: Chronic Conditions and Co-morbidities  Goal: Patient's chronic conditions and co-morbidity symptoms are monitored and maintained or improved  Outcome: Progressing     Problem: ABCDS Injury Assessment  Goal: Absence of physical injury  Outcome: Progressing

## 2024-04-12 NOTE — CARE COORDINATION
Plan remains back to [facility] [place] LOC. NO PRECERT needed. IV Merrem currently, if will need at dc, will need IV access placed, ID following. KASEY will need signed by physician.

## 2024-04-12 NOTE — FLOWSHEET NOTE
Inpatient Wound Care    Admit Date: 4/9/2024  9:28 AM    Reason for consult:  skin care    Significant history:    Past Medical History:   Diagnosis Date    Arthritis     rheumatoid    Diabetes     borderline; A1C 6.3    Heart murmur     History of blood transfusion     Leg edema     Osteoarthritis     Rheumatoid arthritis (HCC)     Torticollis        Wound history:      Findings:     04/12/24 1325   Skin Integrity Site 2   Skin Integrity Location 2 Redness   Location 2 coccyx   Preventative Dressing Yes   Skin Integrity Site 3   Skin Integrity Location 3 Redness    Location 3 bilateral heels         Impression:  redness coccyx area bilateral heels    Interventions in place:  low air loss bed  Mepilex to sacral area  Heel protectors    Plan:  Cont preventive measures      Stacia Suarez RN 4/12/2024 1:27 PM

## 2024-04-13 ENCOUNTER — APPOINTMENT (OUTPATIENT)
Dept: GENERAL RADIOLOGY | Age: 69
DRG: 720 | End: 2024-04-13
Payer: MEDICAID

## 2024-04-13 ENCOUNTER — APPOINTMENT (OUTPATIENT)
Dept: ULTRASOUND IMAGING | Age: 69
DRG: 720 | End: 2024-04-13
Payer: MEDICAID

## 2024-04-13 PROBLEM — I47.10 PAROXYSMAL SVT (SUPRAVENTRICULAR TACHYCARDIA) (HCC): Status: ACTIVE | Noted: 2024-04-13

## 2024-04-13 LAB
ALBUMIN SERPL-MCNC: 3.1 G/DL (ref 3.5–5.2)
ALP SERPL-CCNC: 101 U/L (ref 35–104)
ALT SERPL-CCNC: 10 U/L (ref 0–32)
ANION GAP SERPL CALCULATED.3IONS-SCNC: 12 MMOL/L (ref 7–16)
AST SERPL-CCNC: 13 U/L (ref 0–31)
BILIRUB SERPL-MCNC: 0.2 MG/DL (ref 0–1.2)
BNP SERPL-MCNC: 9253 PG/ML (ref 0–450)
BUN SERPL-MCNC: 15 MG/DL (ref 6–23)
CALCIUM SERPL-MCNC: 8.5 MG/DL (ref 8.6–10.2)
CHLORIDE SERPL-SCNC: 105 MMOL/L (ref 98–107)
CO2 SERPL-SCNC: 20 MMOL/L (ref 22–29)
CREAT SERPL-MCNC: 0.8 MG/DL (ref 0.5–1)
EKG ATRIAL RATE: 105 BPM
EKG Q-T INTERVAL: 302 MS
EKG QRS DURATION: 72 MS
EKG QTC CALCULATION (BAZETT): 477 MS
EKG R AXIS: -21 DEGREES
EKG T AXIS: -27 DEGREES
EKG VENTRICULAR RATE: 150 BPM
GFR SERPL CREATININE-BSD FRML MDRD: 87 ML/MIN/1.73M2
GLUCOSE BLD-MCNC: 141 MG/DL (ref 74–99)
GLUCOSE BLD-MCNC: 181 MG/DL (ref 74–99)
GLUCOSE BLD-MCNC: 223 MG/DL (ref 74–99)
GLUCOSE BLD-MCNC: 288 MG/DL (ref 74–99)
GLUCOSE SERPL-MCNC: 245 MG/DL (ref 74–99)
MAGNESIUM SERPL-MCNC: 2.1 MG/DL (ref 1.6–2.6)
POTASSIUM SERPL-SCNC: 4.5 MMOL/L (ref 3.5–5)
PROCALCITONIN SERPL-MCNC: 2.85 NG/ML (ref 0–0.08)
PROT SERPL-MCNC: 7.1 G/DL (ref 6.4–8.3)
SODIUM SERPL-SCNC: 137 MMOL/L (ref 132–146)
TSH SERPL DL<=0.05 MIU/L-ACNC: 2.02 UIU/ML (ref 0.27–4.2)

## 2024-04-13 PROCEDURE — 6360000002 HC RX W HCPCS: Performed by: CLINICAL NURSE SPECIALIST

## 2024-04-13 PROCEDURE — 93005 ELECTROCARDIOGRAM TRACING: CPT | Performed by: INTERNAL MEDICINE

## 2024-04-13 PROCEDURE — 80053 COMPREHEN METABOLIC PANEL: CPT

## 2024-04-13 PROCEDURE — 6360000002 HC RX W HCPCS: Performed by: INTERNAL MEDICINE

## 2024-04-13 PROCEDURE — 93970 EXTREMITY STUDY: CPT

## 2024-04-13 PROCEDURE — 6360000002 HC RX W HCPCS

## 2024-04-13 PROCEDURE — 84443 ASSAY THYROID STIM HORMONE: CPT

## 2024-04-13 PROCEDURE — 83735 ASSAY OF MAGNESIUM: CPT

## 2024-04-13 PROCEDURE — 2500000003 HC RX 250 WO HCPCS: Performed by: INTERNAL MEDICINE

## 2024-04-13 PROCEDURE — 2580000003 HC RX 258: Performed by: CLINICAL NURSE SPECIALIST

## 2024-04-13 PROCEDURE — 82962 GLUCOSE BLOOD TEST: CPT

## 2024-04-13 PROCEDURE — 2060000000 HC ICU INTERMEDIATE R&B

## 2024-04-13 PROCEDURE — 74018 RADEX ABDOMEN 1 VIEW: CPT

## 2024-04-13 PROCEDURE — 99232 SBSQ HOSP IP/OBS MODERATE 35: CPT | Performed by: INTERNAL MEDICINE

## 2024-04-13 PROCEDURE — 84145 PROCALCITONIN (PCT): CPT

## 2024-04-13 PROCEDURE — 6370000000 HC RX 637 (ALT 250 FOR IP): Performed by: INTERNAL MEDICINE

## 2024-04-13 PROCEDURE — P9047 ALBUMIN (HUMAN), 25%, 50ML: HCPCS | Performed by: INTERNAL MEDICINE

## 2024-04-13 PROCEDURE — 93010 ELECTROCARDIOGRAM REPORT: CPT | Performed by: INTERNAL MEDICINE

## 2024-04-13 PROCEDURE — 71045 X-RAY EXAM CHEST 1 VIEW: CPT

## 2024-04-13 PROCEDURE — 83880 ASSAY OF NATRIURETIC PEPTIDE: CPT

## 2024-04-13 PROCEDURE — 99291 CRITICAL CARE FIRST HOUR: CPT | Performed by: INTERNAL MEDICINE

## 2024-04-13 PROCEDURE — 2580000003 HC RX 258: Performed by: INTERNAL MEDICINE

## 2024-04-13 RX ORDER — ADENOSINE 3 MG/ML
6 INJECTION, SOLUTION INTRAVENOUS ONCE
Status: COMPLETED | OUTPATIENT
Start: 2024-04-13 | End: 2024-04-13

## 2024-04-13 RX ORDER — ADENOSINE 3 MG/ML
INJECTION, SOLUTION INTRAVENOUS
Status: COMPLETED
Start: 2024-04-13 | End: 2024-04-13

## 2024-04-13 RX ORDER — ALBUMIN (HUMAN) 12.5 G/50ML
25 SOLUTION INTRAVENOUS ONCE
Status: COMPLETED | OUTPATIENT
Start: 2024-04-13 | End: 2024-04-13

## 2024-04-13 RX ORDER — DILTIAZEM HYDROCHLORIDE 5 MG/ML
5 INJECTION INTRAVENOUS EVERY 5 MIN PRN
Status: COMPLETED | OUTPATIENT
Start: 2024-04-13 | End: 2024-04-13

## 2024-04-13 RX ORDER — DIPHENHYDRAMINE HCL 25 MG
50 TABLET ORAL ONCE
Status: COMPLETED | OUTPATIENT
Start: 2024-04-14 | End: 2024-04-14

## 2024-04-13 RX ORDER — METOPROLOL TARTRATE 1 MG/ML
2.5 INJECTION, SOLUTION INTRAVENOUS ONCE
Status: DISCONTINUED | OUTPATIENT
Start: 2024-04-13 | End: 2024-04-13

## 2024-04-13 RX ORDER — FUROSEMIDE 10 MG/ML
20 INJECTION INTRAMUSCULAR; INTRAVENOUS ONCE
Status: COMPLETED | OUTPATIENT
Start: 2024-04-13 | End: 2024-04-13

## 2024-04-13 RX ORDER — DIPHENHYDRAMINE HCL 25 MG
50 TABLET ORAL ONCE
Status: COMPLETED | OUTPATIENT
Start: 2024-04-13 | End: 2024-04-13

## 2024-04-13 RX ADMIN — PREDNISONE 50 MG: 20 TABLET ORAL at 16:50

## 2024-04-13 RX ADMIN — ENOXAPARIN SODIUM 40 MG: 100 INJECTION SUBCUTANEOUS at 17:55

## 2024-04-13 RX ADMIN — INSULIN LISPRO 1 UNITS: 100 INJECTION, SOLUTION INTRAVENOUS; SUBCUTANEOUS at 11:45

## 2024-04-13 RX ADMIN — METOPROLOL TARTRATE 25 MG: 25 TABLET, FILM COATED ORAL at 13:01

## 2024-04-13 RX ADMIN — MEROPENEM 1000 MG: 1 INJECTION, POWDER, FOR SOLUTION INTRAVENOUS at 17:55

## 2024-04-13 RX ADMIN — METOPROLOL TARTRATE 25 MG: 25 TABLET, FILM COATED ORAL at 15:47

## 2024-04-13 RX ADMIN — ANTI-FUNGAL POWDER MICONAZOLE NITRATE TALC FREE: 1.42 POWDER TOPICAL at 09:14

## 2024-04-13 RX ADMIN — MIDODRINE HYDROCHLORIDE 2.5 MG: 5 TABLET ORAL at 17:55

## 2024-04-13 RX ADMIN — HEPARIN 100 UNITS: 100 SYRINGE at 21:13

## 2024-04-13 RX ADMIN — HEPARIN 100 UNITS: 100 SYRINGE at 09:14

## 2024-04-13 RX ADMIN — MIDODRINE HYDROCHLORIDE 2.5 MG: 5 TABLET ORAL at 11:45

## 2024-04-13 RX ADMIN — ADENOSINE 6 MG: 3 INJECTION, SOLUTION INTRAVENOUS at 14:56

## 2024-04-13 RX ADMIN — Medication 10 ML: at 09:14

## 2024-04-13 RX ADMIN — METOPROLOL TARTRATE 25 MG: 25 TABLET, FILM COATED ORAL at 21:13

## 2024-04-13 RX ADMIN — MONTELUKAST 10 MG: 10 TABLET, FILM COATED ORAL at 21:13

## 2024-04-13 RX ADMIN — MIDODRINE HYDROCHLORIDE 2.5 MG: 5 TABLET ORAL at 09:13

## 2024-04-13 RX ADMIN — INSULIN GLARGINE 5 UNITS: 100 INJECTION, SOLUTION SUBCUTANEOUS at 21:13

## 2024-04-13 RX ADMIN — POLYETHYLENE GLYCOL 3350 17 G: 17 POWDER, FOR SOLUTION ORAL at 09:14

## 2024-04-13 RX ADMIN — DILTIAZEM HYDROCHLORIDE 5 MG: 5 INJECTION, SOLUTION INTRAVENOUS at 10:21

## 2024-04-13 RX ADMIN — ALBUMIN (HUMAN) 25 G: 0.25 INJECTION, SOLUTION INTRAVENOUS at 10:12

## 2024-04-13 RX ADMIN — ANTI-FUNGAL POWDER MICONAZOLE NITRATE TALC FREE: 1.42 POWDER TOPICAL at 21:12

## 2024-04-13 RX ADMIN — DOCUSATE SODIUM 100 MG: 100 CAPSULE, LIQUID FILLED ORAL at 09:13

## 2024-04-13 RX ADMIN — MEROPENEM 1000 MG: 1 INJECTION, POWDER, FOR SOLUTION INTRAVENOUS at 01:04

## 2024-04-13 RX ADMIN — Medication 10 ML: at 21:26

## 2024-04-13 RX ADMIN — DILTIAZEM HYDROCHLORIDE 5 MG: 5 INJECTION, SOLUTION INTRAVENOUS at 10:00

## 2024-04-13 RX ADMIN — FUROSEMIDE 20 MG: 10 INJECTION, SOLUTION INTRAMUSCULAR; INTRAVENOUS at 11:45

## 2024-04-13 RX ADMIN — PREDNISONE 50 MG: 20 TABLET ORAL at 21:23

## 2024-04-13 RX ADMIN — DIPHENHYDRAMINE HYDROCHLORIDE 50 MG: 25 TABLET ORAL at 16:50

## 2024-04-13 RX ADMIN — MEROPENEM 1000 MG: 1 INJECTION, POWDER, FOR SOLUTION INTRAVENOUS at 09:22

## 2024-04-13 RX ADMIN — Medication 10 ML: at 21:13

## 2024-04-13 ASSESSMENT — PAIN SCALES - GENERAL: PAINLEVEL_OUTOF10: 0

## 2024-04-13 NOTE — CONSULTS
[address] [place] [address] [address] [place] [address]  Phone [phone]   [phone]      Admit Date: 2024  9:28 AM  Pt Name: Olga Downey  MRN: [id number]  : 1955  Reason for Consult:    Chief Complaint   Patient presents with    Tachycardia     [place]. Per EMS on arrival /75 . Received 500mL bolus PTA    Altered Mental Status     Per EMS family states new onset confusion. A&Ox2 during triage     Requesting Physician:  Santos Strong DO  PCP: No primary care provider on file.  History Obtained From:  patient, chart   ID consulted for Sepsis due to pneumonia (HCC) [J18.9, A41.9]  on hospital day 2  CHIEF COMPLAINT       Chief Complaint   Patient presents with    Tachycardia     [place] and [place]. Per EMS on arrival /75 . Received 500mL bolus PTA    Altered Mental Status     Per EMS family states new onset confusion. A&Ox2 during triage     HISTORYOF PRESENT ILLNESS   Olga Downey is a 68 y.o. female who  has a past medical history of Arthritis, Diabetes, Heart murmur, History of blood transfusion, Leg edema, Osteoarthritis, Rheumatoid arthritis (HCC), and Torticollis.   Presented to ED TRIAGE VITALS  BP: 108/67, Temp: 99.4 °F (37.4 °C), Pulse: 90, Respirations: 16, SpO2: 90 %  HPI:  ID was consulted on 24 for infection management  Pt presented to ER on 2024 with diagnosis of  Sepsis due to pneumonia (HCC) [J18.9, A41.9]     Pt from NH with change of MS pt had fevers no n/v/d/ appetite good    Uses samy/walker  Temp tmax99.6 low grade fever was tachy  2L  Wbc17.2->16.3  cr1  Blood cx E coli  IP CONSULT TO INFECTIOUS DISEASES    Currently on  Inpat Anti-Infectives (From admission, onward)       Start     Ordered Stop    24 1000  meropenem (MERREM) 1,000 mg in sodium chloride 0.9 % 100 mL IVPB ([id number])  1,000 mg,   IntraVENous,   EVERY 8 HOURS 
1,000 mg, IntraVENous, Q8H, Santos Strong, DO, Stopped at 04/13/24 1301    [Held by provider] furosemide (LASIX) tablet 20 mg, 20 mg, Oral, Daily, Galilea Strongs, DO    montelukast (SINGULAIR) tablet 10 mg, 10 mg, Oral, Nightly, Galilea Strongs, DO, 10 mg at 04/12/24 2041    sodium chloride flush 0.9 % injection 5-40 mL, 5-40 mL, IntraVENous, 2 times per day, Galilea Strongs, DO, 10 mL at 04/13/24 0914    sodium chloride flush 0.9 % injection 5-40 mL, 5-40 mL, IntraVENous, PRN, Santos Strong, DO    0.9 % sodium chloride infusion, , IntraVENous, PRN, Galilea Strongs, DO    enoxaparin (LOVENOX) injection 40 mg, 40 mg, SubCUTAneous, Q24H, Santos Strong, DO, 40 mg at 04/12/24 1709    ondansetron (ZOFRAN-ODT) disintegrating tablet 4 mg, 4 mg, Oral, Q8H PRN **OR** ondansetron (ZOFRAN) injection 4 mg, 4 mg, IntraVENous, Q6H PRN, Galilea Strongs, DO    acetaminophen (TYLENOL) tablet 650 mg, 650 mg, Oral, Q6H PRN, 650 mg at 04/12/24 2041 **OR** acetaminophen (TYLENOL) suppository 650 mg, 650 mg, Rectal, Q6H PRN, Galilea Strongs, DO    insulin lispro (HUMALOG) injection vial 0-4 Units, 0-4 Units, SubCUTAneous, TID WC, Galilea Strongs, DO, 1 Units at 04/13/24 1145    insulin lispro (HUMALOG) injection vial 0-4 Units, 0-4 Units, SubCUTAneous, Nightly, Suhas Strongolas, DO, 4 Units at 04/09/24 2032    glucose chewable tablet 16 g, 4 tablet, Oral, PRN, Suhas Strongolas, DO    dextrose bolus 10% 125 mL, 125 mL, IntraVENous, PRN **OR** dextrose bolus 10% 250 mL, 250 mL, IntraVENous, PRN, Santos Strong DO    glucagon injection 1 mg, 1 mg, SubCUTAneous, PRN, Santos Strong DO    dextrose 10 % infusion, , IntraVENous, Continuous PRN, Santos Strong DO    miconazole (MICOTIN) 2 % powder, , Topical, BID, Santos Strong DO, Given at 04/13/24 0914    insulin glargine (LANTUS) injection vial 5 Units, 5 Units, SubCUTAneous, Nightly, Santos Strong DO, 5 Units at 04/12/24

## 2024-04-14 ENCOUNTER — APPOINTMENT (OUTPATIENT)
Dept: CT IMAGING | Age: 69
DRG: 720 | End: 2024-04-14
Payer: MEDICAID

## 2024-04-14 PROBLEM — J90 BILATERAL PLEURAL EFFUSION: Status: ACTIVE | Noted: 2024-04-14

## 2024-04-14 PROBLEM — I50.9 ACUTE DECOMPENSATED HEART FAILURE (HCC): Status: ACTIVE | Noted: 2024-04-14

## 2024-04-14 LAB
GLUCOSE BLD-MCNC: 282 MG/DL (ref 74–99)
GLUCOSE BLD-MCNC: 289 MG/DL (ref 74–99)
GLUCOSE BLD-MCNC: 295 MG/DL (ref 74–99)
GLUCOSE BLD-MCNC: 338 MG/DL (ref 74–99)
MICROORGANISM SPEC CULT: NORMAL
SERVICE CMNT-IMP: NORMAL
SPECIMEN DESCRIPTION: NORMAL

## 2024-04-14 PROCEDURE — 99233 SBSQ HOSP IP/OBS HIGH 50: CPT | Performed by: INTERNAL MEDICINE

## 2024-04-14 PROCEDURE — 82962 GLUCOSE BLOOD TEST: CPT

## 2024-04-14 PROCEDURE — 6360000002 HC RX W HCPCS: Performed by: INTERNAL MEDICINE

## 2024-04-14 PROCEDURE — 2580000003 HC RX 258: Performed by: INTERNAL MEDICINE

## 2024-04-14 PROCEDURE — 2580000003 HC RX 258: Performed by: CLINICAL NURSE SPECIALIST

## 2024-04-14 PROCEDURE — 71275 CT ANGIOGRAPHY CHEST: CPT

## 2024-04-14 PROCEDURE — 6370000000 HC RX 637 (ALT 250 FOR IP): Performed by: INTERNAL MEDICINE

## 2024-04-14 PROCEDURE — 2060000000 HC ICU INTERMEDIATE R&B

## 2024-04-14 PROCEDURE — 6360000002 HC RX W HCPCS: Performed by: CLINICAL NURSE SPECIALIST

## 2024-04-14 PROCEDURE — 6360000004 HC RX CONTRAST MEDICATION: Performed by: RADIOLOGY

## 2024-04-14 PROCEDURE — P9047 ALBUMIN (HUMAN), 25%, 50ML: HCPCS | Performed by: INTERNAL MEDICINE

## 2024-04-14 RX ORDER — ALBUMIN (HUMAN) 12.5 G/50ML
12.5 SOLUTION INTRAVENOUS EVERY 6 HOURS
Status: COMPLETED | OUTPATIENT
Start: 2024-04-14 | End: 2024-04-14

## 2024-04-14 RX ORDER — INSULIN GLARGINE 100 [IU]/ML
10 INJECTION, SOLUTION SUBCUTANEOUS NIGHTLY
Status: DISCONTINUED | OUTPATIENT
Start: 2024-04-14 | End: 2024-04-14

## 2024-04-14 RX ORDER — FUROSEMIDE 10 MG/ML
20 INJECTION INTRAMUSCULAR; INTRAVENOUS 2 TIMES DAILY
Status: COMPLETED | OUTPATIENT
Start: 2024-04-14 | End: 2024-04-15

## 2024-04-14 RX ORDER — INSULIN GLARGINE 100 [IU]/ML
8 INJECTION, SOLUTION SUBCUTANEOUS NIGHTLY
Status: DISCONTINUED | OUTPATIENT
Start: 2024-04-14 | End: 2024-04-16 | Stop reason: HOSPADM

## 2024-04-14 RX ADMIN — HEPARIN 100 UNITS: 100 SYRINGE at 09:27

## 2024-04-14 RX ADMIN — MEROPENEM 1000 MG: 1 INJECTION, POWDER, FOR SOLUTION INTRAVENOUS at 02:02

## 2024-04-14 RX ADMIN — FUROSEMIDE 20 MG: 10 INJECTION, SOLUTION INTRAMUSCULAR; INTRAVENOUS at 17:00

## 2024-04-14 RX ADMIN — ANTI-FUNGAL POWDER MICONAZOLE NITRATE TALC FREE: 1.42 POWDER TOPICAL at 09:27

## 2024-04-14 RX ADMIN — DOCUSATE SODIUM 100 MG: 100 CAPSULE, LIQUID FILLED ORAL at 09:27

## 2024-04-14 RX ADMIN — DIPHENHYDRAMINE HYDROCHLORIDE 50 MG: 25 TABLET ORAL at 05:07

## 2024-04-14 RX ADMIN — MONTELUKAST 10 MG: 10 TABLET, FILM COATED ORAL at 22:14

## 2024-04-14 RX ADMIN — MEROPENEM 1000 MG: 1 INJECTION, POWDER, FOR SOLUTION INTRAVENOUS at 17:07

## 2024-04-14 RX ADMIN — INSULIN LISPRO 3 UNITS: 100 INJECTION, SOLUTION INTRAVENOUS; SUBCUTANEOUS at 11:49

## 2024-04-14 RX ADMIN — METOPROLOL TARTRATE 12.5 MG: 25 TABLET, FILM COATED ORAL at 09:26

## 2024-04-14 RX ADMIN — INSULIN LISPRO 2 UNITS: 100 INJECTION, SOLUTION INTRAVENOUS; SUBCUTANEOUS at 17:00

## 2024-04-14 RX ADMIN — PREDNISONE 50 MG: 20 TABLET ORAL at 05:07

## 2024-04-14 RX ADMIN — ANTI-FUNGAL POWDER MICONAZOLE NITRATE TALC FREE: 1.42 POWDER TOPICAL at 22:20

## 2024-04-14 RX ADMIN — Medication 10 ML: at 22:45

## 2024-04-14 RX ADMIN — INSULIN LISPRO 2 UNITS: 100 INJECTION, SOLUTION INTRAVENOUS; SUBCUTANEOUS at 08:00

## 2024-04-14 RX ADMIN — FUROSEMIDE 20 MG: 10 INJECTION, SOLUTION INTRAMUSCULAR; INTRAVENOUS at 10:27

## 2024-04-14 RX ADMIN — IOPAMIDOL 75 ML: 755 INJECTION, SOLUTION INTRAVENOUS at 06:02

## 2024-04-14 RX ADMIN — ALBUMIN (HUMAN) 12.5 G: 0.25 INJECTION, SOLUTION INTRAVENOUS at 09:11

## 2024-04-14 RX ADMIN — Medication 10 ML: at 22:15

## 2024-04-14 RX ADMIN — METOPROLOL TARTRATE 12.5 MG: 25 TABLET, FILM COATED ORAL at 22:13

## 2024-04-14 RX ADMIN — POLYETHYLENE GLYCOL 3350 17 G: 17 POWDER, FOR SOLUTION ORAL at 09:27

## 2024-04-14 RX ADMIN — ENOXAPARIN SODIUM 40 MG: 100 INJECTION SUBCUTANEOUS at 17:00

## 2024-04-14 RX ADMIN — INSULIN GLARGINE 8 UNITS: 100 INJECTION, SOLUTION SUBCUTANEOUS at 22:14

## 2024-04-14 RX ADMIN — MEROPENEM 1000 MG: 1 INJECTION, POWDER, FOR SOLUTION INTRAVENOUS at 09:32

## 2024-04-14 RX ADMIN — HEPARIN 100 UNITS: 100 SYRINGE at 22:15

## 2024-04-14 RX ADMIN — Medication 10 ML: at 09:12

## 2024-04-14 RX ADMIN — ALBUMIN (HUMAN) 12.5 G: 0.25 INJECTION, SOLUTION INTRAVENOUS at 13:58

## 2024-04-14 ASSESSMENT — PAIN SCALES - GENERAL
PAINLEVEL_OUTOF10: 0
PAINLEVEL_OUTOF10: 0

## 2024-04-14 NOTE — PLAN OF CARE
Problem: Discharge Planning  Goal: Discharge to home or other facility with appropriate resources  4/13/2024 1559 by Pasha Barajas RN  Outcome: Progressing     Problem: Skin/Tissue Integrity  Goal: Absence of new skin breakdown  Description: 1.  Monitor for areas of redness and/or skin breakdown  2.  Assess vascular access sites hourly  3.  Every 4-6 hours minimum:  Change oxygen saturation probe site  4.  Every 4-6 hours:  If on nasal continuous positive airway pressure, respiratory therapy assess nares and determine need for appliance change or resting period.  4/14/2024 0006 by Ad Del Rosario RN  Outcome: Progressing  4/13/2024 1559 by Pasha Barajas RN  Outcome: Progressing     Problem: Safety - Adult  Goal: Free from fall injury  4/14/2024 0006 by Ad Del Rosario RN  Outcome: Progressing  4/13/2024 1559 by Pasha Barajas RN  Outcome: Progressing     Problem: Pain  Goal: Verbalizes/displays adequate comfort level or baseline comfort level  4/13/2024 1559 by Pasha Barajas RN  Outcome: Progressing     Problem: Chronic Conditions and Co-morbidities  Goal: Patient's chronic conditions and co-morbidity symptoms are monitored and maintained or improved  4/13/2024 1559 by Pasha Barajas RN  Outcome: Progressing     Problem: ABCDS Injury Assessment  Goal: Absence of physical injury  4/13/2024 1559 by Pasha Barajas RN  Outcome: Progressing

## 2024-04-14 NOTE — PLAN OF CARE
Problem: Discharge Planning  Goal: Discharge to home or other facility with appropriate resources  Outcome: Progressing  Flowsheets  Taken 4/14/2024 1154  Discharge to home or other facility with appropriate resources: Identify barriers to discharge with patient and caregiver  Taken 4/14/2024 0839  Discharge to home or other facility with appropriate resources: Identify barriers to discharge with patient and caregiver     Problem: Skin/Tissue Integrity  Goal: Absence of new skin breakdown  Description: 1.  Monitor for areas of redness and/or skin breakdown  2.  Assess vascular access sites hourly  3.  Every 4-6 hours minimum:  Change oxygen saturation probe site  4.  Every 4-6 hours:  If on nasal continuous positive airway pressure, respiratory therapy assess nares and determine need for appliance change or resting period.  Outcome: Progressing     Problem: Safety - Adult  Goal: Free from fall injury  Outcome: Progressing     Problem: Pain  Goal: Verbalizes/displays adequate comfort level or baseline comfort level  Outcome: Progressing     Problem: Chronic Conditions and Co-morbidities  Goal: Patient's chronic conditions and co-morbidity symptoms are monitored and maintained or improved  Outcome: Progressing  Flowsheets  Taken 4/14/2024 1154  Care Plan - Patient's Chronic Conditions and Co-Morbidity Symptoms are Monitored and Maintained or Improved: Monitor and assess patient's chronic conditions and comorbid symptoms for stability, deterioration, or improvement  Taken 4/14/2024 0839  Care Plan - Patient's Chronic Conditions and Co-Morbidity Symptoms are Monitored and Maintained or Improved: Monitor and assess patient's chronic conditions and comorbid symptoms for stability, deterioration, or improvement     Problem: ABCDS Injury Assessment  Goal: Absence of physical injury  Outcome: Progressing

## 2024-04-15 ENCOUNTER — APPOINTMENT (OUTPATIENT)
Age: 69
DRG: 720 | End: 2024-04-15
Payer: MEDICAID

## 2024-04-15 PROBLEM — A41.9 SEPSIS (HCC): Status: ACTIVE | Noted: 2024-04-15

## 2024-04-15 LAB
ANION GAP SERPL CALCULATED.3IONS-SCNC: 11 MMOL/L (ref 7–16)
BUN SERPL-MCNC: 30 MG/DL (ref 6–23)
CALCIUM SERPL-MCNC: 9.1 MG/DL (ref 8.6–10.2)
CHLORIDE SERPL-SCNC: 101 MMOL/L (ref 98–107)
CO2 SERPL-SCNC: 28 MMOL/L (ref 22–29)
CREAT SERPL-MCNC: 0.9 MG/DL (ref 0.5–1)
ECHO AV AREA PEAK VELOCITY: 1.4 CM2
ECHO AV AREA PEAK VELOCITY: 1.4 CM2
ECHO AV AREA PEAK VELOCITY: 1.7 CM2
ECHO AV AREA PEAK VELOCITY: 1.8 CM2
ECHO AV AREA VTI: 1.6 CM2
ECHO AV AREA/BSA VTI: 0.9 CM2/M2
ECHO AV CUSP MM: 1.3 CM
ECHO AV MEAN GRADIENT: 8 MMHG
ECHO AV MEAN VELOCITY: 1.4 M/S
ECHO AV PEAK GRADIENT: 14 MMHG
ECHO AV PEAK GRADIENT: 14 MMHG
ECHO AV PEAK VELOCITY: 1.8 M/S
ECHO AV PEAK VELOCITY: 1.9 M/S
ECHO AV VTI: 37.5 CM
ECHO BSA: 1.91 M2
ECHO EST RA PRESSURE: 3 MMHG
ECHO LA DIAMETER INDEX: 1.83 CM/M2
ECHO LA DIAMETER: 3.4 CM
ECHO LA VOL A-L A2C: 68 ML (ref 22–52)
ECHO LA VOL A-L A4C: 53 ML (ref 22–52)
ECHO LA VOL BP: 58 ML (ref 22–52)
ECHO LA VOL MOD A2C: 62 ML (ref 22–52)
ECHO LA VOL MOD A4C: 52 ML (ref 22–52)
ECHO LA VOL/BSA BIPLANE: 31 ML/M2 (ref 16–34)
ECHO LA VOLUME AREA LENGTH: 63 ML
ECHO LA VOLUME INDEX A-L A2C: 37 ML/M2 (ref 16–34)
ECHO LA VOLUME INDEX A-L A4C: 28 ML/M2 (ref 16–34)
ECHO LA VOLUME INDEX AREA LENGTH: 34 ML/M2 (ref 16–34)
ECHO LA VOLUME INDEX MOD A2C: 33 ML/M2 (ref 16–34)
ECHO LA VOLUME INDEX MOD A4C: 28 ML/M2 (ref 16–34)
ECHO LV FRACTIONAL SHORTENING: 38 % (ref 28–44)
ECHO LV INTERNAL DIMENSION DIASTOLE INDEX: 2.85 CM/M2
ECHO LV INTERNAL DIMENSION DIASTOLIC: 5.3 CM (ref 3.9–5.3)
ECHO LV INTERNAL DIMENSION SYSTOLIC INDEX: 1.77 CM/M2
ECHO LV INTERNAL DIMENSION SYSTOLIC: 3.3 CM
ECHO LV IVSD: 0.9 CM (ref 0.6–0.9)
ECHO LV IVSS: 1.5 CM
ECHO LV MASS 2D: 187.3 G (ref 67–162)
ECHO LV MASS INDEX 2D: 100.7 G/M2 (ref 43–95)
ECHO LV POSTERIOR WALL DIASTOLIC: 1 CM (ref 0.6–0.9)
ECHO LV POSTERIOR WALL SYSTOLIC: 1.7 CM
ECHO LV RELATIVE WALL THICKNESS RATIO: 0.38
ECHO LVOT AREA: 3.1 CM2
ECHO LVOT AV VTI INDEX: 0.48
ECHO LVOT DIAM: 2 CM
ECHO LVOT MEAN GRADIENT: 1 MMHG
ECHO LVOT PEAK GRADIENT: 2 MMHG
ECHO LVOT PEAK GRADIENT: 4 MMHG
ECHO LVOT PEAK VELOCITY: 0.8 M/S
ECHO LVOT PEAK VELOCITY: 1 M/S
ECHO LVOT STROKE VOLUME INDEX: 30.4 ML/M2
ECHO LVOT SV: 56.5 ML
ECHO LVOT VTI: 18 CM
ECHO MV "A" WAVE DURATION: 201.7 MSEC
ECHO MV A VELOCITY: 1.05 M/S
ECHO MV AREA PHT: 4.2 CM2
ECHO MV AREA VTI: 2.4 CM2
ECHO MV E DECELERATION TIME (DT): 203.8 MS
ECHO MV E VELOCITY: 0.83 M/S
ECHO MV E/A RATIO: 0.79
ECHO MV LVOT VTI INDEX: 1.33
ECHO MV MAX VELOCITY: 1 M/S
ECHO MV MEAN GRADIENT: 2 MMHG
ECHO MV MEAN VELOCITY: 0.6 M/S
ECHO MV PEAK GRADIENT: 4 MMHG
ECHO MV PRESSURE HALF TIME (PHT): 52.6 MS
ECHO MV VTI: 24 CM
ECHO PV MAX VELOCITY: 1.5 M/S
ECHO PV MEAN GRADIENT: 4 MMHG
ECHO PV MEAN VELOCITY: 1 M/S
ECHO PV PEAK GRADIENT: 9 MMHG
ECHO PV VTI: 28.2 CM
ECHO PVEIN A DURATION: 144.6 MS
ECHO PVEIN A VELOCITY: 0.3 M/S
ECHO PVEIN PEAK D VELOCITY: 0.3 M/S
ECHO PVEIN PEAK S VELOCITY: 0.6 M/S
ECHO PVEIN S/D RATIO: 2
ECHO RIGHT VENTRICULAR SYSTOLIC PRESSURE (RVSP): 15 MMHG
ECHO RV INTERNAL DIMENSION: 3.6 CM
ECHO RV LONGITUDINAL DIMENSION: 7 CM
ECHO RV MID DIMENSION: 3.8 CM
ECHO RVOT MEAN GRADIENT: 1 MMHG
ECHO RVOT PEAK GRADIENT: 2 MMHG
ECHO RVOT PEAK VELOCITY: 0.7 M/S
ECHO RVOT VTI: 13.9 CM
ECHO TV REGURGITANT MAX VELOCITY: 1.76 M/S
ECHO TV REGURGITANT PEAK GRADIENT: 12 MMHG
GFR SERPL CREATININE-BSD FRML MDRD: 70 ML/MIN/1.73M2
GLUCOSE BLD-MCNC: 164 MG/DL (ref 74–99)
GLUCOSE BLD-MCNC: 248 MG/DL (ref 74–99)
GLUCOSE BLD-MCNC: 286 MG/DL (ref 74–99)
GLUCOSE BLD-MCNC: 297 MG/DL (ref 74–99)
GLUCOSE SERPL-MCNC: 145 MG/DL (ref 74–99)
MAGNESIUM SERPL-MCNC: 2.4 MG/DL (ref 1.6–2.6)
POTASSIUM SERPL-SCNC: 4.1 MMOL/L (ref 3.5–5)
SODIUM SERPL-SCNC: 140 MMOL/L (ref 132–146)

## 2024-04-15 PROCEDURE — 6360000002 HC RX W HCPCS: Performed by: INTERNAL MEDICINE

## 2024-04-15 PROCEDURE — 93306 TTE W/DOPPLER COMPLETE: CPT | Performed by: INTERNAL MEDICINE

## 2024-04-15 PROCEDURE — 80048 BASIC METABOLIC PNL TOTAL CA: CPT

## 2024-04-15 PROCEDURE — 2060000000 HC ICU INTERMEDIATE R&B

## 2024-04-15 PROCEDURE — 82962 GLUCOSE BLOOD TEST: CPT

## 2024-04-15 PROCEDURE — 6370000000 HC RX 637 (ALT 250 FOR IP): Performed by: INTERNAL MEDICINE

## 2024-04-15 PROCEDURE — 83735 ASSAY OF MAGNESIUM: CPT

## 2024-04-15 PROCEDURE — 93306 TTE W/DOPPLER COMPLETE: CPT

## 2024-04-15 PROCEDURE — 2580000003 HC RX 258: Performed by: INTERNAL MEDICINE

## 2024-04-15 PROCEDURE — 2580000003 HC RX 258: Performed by: CLINICAL NURSE SPECIALIST

## 2024-04-15 PROCEDURE — 99233 SBSQ HOSP IP/OBS HIGH 50: CPT | Performed by: INTERNAL MEDICINE

## 2024-04-15 PROCEDURE — 6360000002 HC RX W HCPCS: Performed by: CLINICAL NURSE SPECIALIST

## 2024-04-15 RX ORDER — FUROSEMIDE 40 MG/1
40 TABLET ORAL DAILY
Status: DISCONTINUED | OUTPATIENT
Start: 2024-04-16 | End: 2024-04-16 | Stop reason: HOSPADM

## 2024-04-15 RX ADMIN — ACETAMINOPHEN 650 MG: 325 TABLET ORAL at 12:40

## 2024-04-15 RX ADMIN — HEPARIN 100 UNITS: 100 SYRINGE at 21:00

## 2024-04-15 RX ADMIN — MEROPENEM 1000 MG: 1 INJECTION, POWDER, FOR SOLUTION INTRAVENOUS at 02:05

## 2024-04-15 RX ADMIN — Medication 10 ML: at 09:42

## 2024-04-15 RX ADMIN — METOPROLOL TARTRATE 12.5 MG: 25 TABLET, FILM COATED ORAL at 09:42

## 2024-04-15 RX ADMIN — FUROSEMIDE 20 MG: 10 INJECTION, SOLUTION INTRAMUSCULAR; INTRAVENOUS at 09:44

## 2024-04-15 RX ADMIN — MIDODRINE HYDROCHLORIDE 2.5 MG: 5 TABLET ORAL at 09:42

## 2024-04-15 RX ADMIN — POLYETHYLENE GLYCOL 3350 17 G: 17 POWDER, FOR SOLUTION ORAL at 12:40

## 2024-04-15 RX ADMIN — INSULIN LISPRO 2 UNITS: 100 INJECTION, SOLUTION INTRAVENOUS; SUBCUTANEOUS at 12:11

## 2024-04-15 RX ADMIN — Medication 10 ML: at 21:10

## 2024-04-15 RX ADMIN — MIDODRINE HYDROCHLORIDE 2.5 MG: 5 TABLET ORAL at 12:40

## 2024-04-15 RX ADMIN — Medication 10 ML: at 09:41

## 2024-04-15 RX ADMIN — FUROSEMIDE 20 MG: 10 INJECTION, SOLUTION INTRAMUSCULAR; INTRAVENOUS at 16:17

## 2024-04-15 RX ADMIN — ACETAMINOPHEN 650 MG: 325 TABLET ORAL at 21:00

## 2024-04-15 RX ADMIN — Medication 10 ML: at 21:11

## 2024-04-15 RX ADMIN — HEPARIN 100 UNITS: 100 SYRINGE at 09:41

## 2024-04-15 RX ADMIN — INSULIN LISPRO 1 UNITS: 100 INJECTION, SOLUTION INTRAVENOUS; SUBCUTANEOUS at 16:25

## 2024-04-15 RX ADMIN — MIDODRINE HYDROCHLORIDE 2.5 MG: 5 TABLET ORAL at 16:18

## 2024-04-15 RX ADMIN — MEROPENEM 1000 MG: 1 INJECTION, POWDER, FOR SOLUTION INTRAVENOUS at 16:32

## 2024-04-15 RX ADMIN — INSULIN GLARGINE 8 UNITS: 100 INJECTION, SOLUTION SUBCUTANEOUS at 21:00

## 2024-04-15 RX ADMIN — DOCUSATE SODIUM 100 MG: 100 CAPSULE, LIQUID FILLED ORAL at 09:42

## 2024-04-15 RX ADMIN — MEROPENEM 1000 MG: 1 INJECTION, POWDER, FOR SOLUTION INTRAVENOUS at 09:40

## 2024-04-15 RX ADMIN — MONTELUKAST 10 MG: 10 TABLET, FILM COATED ORAL at 21:00

## 2024-04-15 RX ADMIN — METOPROLOL TARTRATE 12.5 MG: 25 TABLET, FILM COATED ORAL at 21:00

## 2024-04-15 RX ADMIN — ANTI-FUNGAL POWDER MICONAZOLE NITRATE TALC FREE: 1.42 POWDER TOPICAL at 23:13

## 2024-04-15 RX ADMIN — ENOXAPARIN SODIUM 40 MG: 100 INJECTION SUBCUTANEOUS at 16:17

## 2024-04-15 RX ADMIN — ANTI-FUNGAL POWDER MICONAZOLE NITRATE TALC FREE: 1.42 POWDER TOPICAL at 09:44

## 2024-04-15 ASSESSMENT — PAIN DESCRIPTION - DESCRIPTORS
DESCRIPTORS: ACHING
DESCRIPTORS: ACHING;SHOOTING
DESCRIPTORS: ACHING;SORE;SHOOTING

## 2024-04-15 ASSESSMENT — PAIN SCALES - WONG BAKER
WONGBAKER_NUMERICALRESPONSE: NO HURT

## 2024-04-15 ASSESSMENT — PAIN DESCRIPTION - ORIENTATION
ORIENTATION: RIGHT;LEFT

## 2024-04-15 ASSESSMENT — PAIN DESCRIPTION - LOCATION
LOCATION: NECK

## 2024-04-15 ASSESSMENT — PAIN SCALES - GENERAL
PAINLEVEL_OUTOF10: 10
PAINLEVEL_OUTOF10: 2
PAINLEVEL_OUTOF10: 0
PAINLEVEL_OUTOF10: 4
PAINLEVEL_OUTOF10: 4
PAINLEVEL_OUTOF10: 6

## 2024-04-15 ASSESSMENT — PAIN - FUNCTIONAL ASSESSMENT: PAIN_FUNCTIONAL_ASSESSMENT: ACTIVITIES ARE NOT PREVENTED

## 2024-04-15 NOTE — CARE COORDINATION
Plan remains back to [facility] at [place], intermediate LOC and NO PRECERT is needed.  Per ID Can plan for IV Merrem Q8 or Invansz Q24, patient has a midline.  Spoke with Kim at [facility] she states they can do either, whichever is best for patient.  Noted Cardiology ordered a Zio patch at DC that will need placed.  Will need KASEY signed at DC.

## 2024-04-16 ENCOUNTER — APPOINTMENT (OUTPATIENT)
Dept: GENERAL RADIOLOGY | Age: 69
DRG: 720 | End: 2024-04-16
Payer: MEDICAID

## 2024-04-16 VITALS
HEART RATE: 78 BPM | HEIGHT: 64 IN | TEMPERATURE: 98.3 F | SYSTOLIC BLOOD PRESSURE: 123 MMHG | BODY MASS INDEX: 30.39 KG/M2 | RESPIRATION RATE: 17 BRPM | WEIGHT: 178 LBS | DIASTOLIC BLOOD PRESSURE: 81 MMHG | OXYGEN SATURATION: 97 %

## 2024-04-16 LAB
ALBUMIN SERPL-MCNC: 3.6 G/DL (ref 3.5–5.2)
ALP SERPL-CCNC: 85 U/L (ref 35–104)
ALT SERPL-CCNC: 11 U/L (ref 0–32)
ANION GAP SERPL CALCULATED.3IONS-SCNC: 11 MMOL/L (ref 7–16)
AST SERPL-CCNC: 9 U/L (ref 0–31)
BASOPHILS # BLD: 0.09 K/UL (ref 0–0.2)
BASOPHILS NFR BLD: 1 % (ref 0–2)
BILIRUB SERPL-MCNC: 0.3 MG/DL (ref 0–1.2)
BNP SERPL-MCNC: 2280 PG/ML (ref 0–450)
BUN SERPL-MCNC: 25 MG/DL (ref 6–23)
CALCIUM SERPL-MCNC: 9.1 MG/DL (ref 8.6–10.2)
CHLORIDE SERPL-SCNC: 98 MMOL/L (ref 98–107)
CO2 SERPL-SCNC: 30 MMOL/L (ref 22–29)
CREAT SERPL-MCNC: 0.7 MG/DL (ref 0.5–1)
EOSINOPHIL # BLD: 0.09 K/UL (ref 0.05–0.5)
EOSINOPHILS RELATIVE PERCENT: 1 % (ref 0–6)
ERYTHROCYTE [DISTWIDTH] IN BLOOD BY AUTOMATED COUNT: 15.4 % (ref 11.5–15)
GFR SERPL CREATININE-BSD FRML MDRD: >90 ML/MIN/1.73M2
GLUCOSE BLD-MCNC: 173 MG/DL (ref 74–99)
GLUCOSE BLD-MCNC: 214 MG/DL (ref 74–99)
GLUCOSE SERPL-MCNC: 242 MG/DL (ref 74–99)
HCT VFR BLD AUTO: 34.4 % (ref 34–48)
HGB BLD-MCNC: 10.9 G/DL (ref 11.5–15.5)
LYMPHOCYTES NFR BLD: 1.18 K/UL (ref 1.5–4)
LYMPHOCYTES RELATIVE PERCENT: 11 % (ref 20–42)
MCH RBC QN AUTO: 28.3 PG (ref 26–35)
MCHC RBC AUTO-ENTMCNC: 31.7 G/DL (ref 32–34.5)
MCV RBC AUTO: 89.4 FL (ref 80–99.9)
METAMYELOCYTES ABSOLUTE COUNT: 0.09 K/UL (ref 0–0.12)
METAMYELOCYTES: 1 % (ref 0–1)
MONOCYTES NFR BLD: 0.54 K/UL (ref 0.1–0.95)
MONOCYTES NFR BLD: 5 % (ref 2–12)
NEUTROPHILS NFR BLD: 81 % (ref 43–80)
NEUTS SEG NFR BLD: 8.41 K/UL (ref 1.8–7.3)
PLATELET # BLD AUTO: 365 K/UL (ref 130–450)
PMV BLD AUTO: 10 FL (ref 7–12)
POTASSIUM SERPL-SCNC: 3.8 MMOL/L (ref 3.5–5)
PROCALCITONIN SERPL-MCNC: 0.75 NG/ML (ref 0–0.08)
PROT SERPL-MCNC: 7.4 G/DL (ref 6.4–8.3)
RBC # BLD AUTO: 3.85 M/UL (ref 3.5–5.5)
RBC # BLD: ABNORMAL 10*6/UL
RBC # BLD: ABNORMAL 10*6/UL
SODIUM SERPL-SCNC: 139 MMOL/L (ref 132–146)
WBC OTHER # BLD: 10.4 K/UL (ref 4.5–11.5)

## 2024-04-16 PROCEDURE — 84145 PROCALCITONIN (PCT): CPT

## 2024-04-16 PROCEDURE — 6370000000 HC RX 637 (ALT 250 FOR IP): Performed by: INTERNAL MEDICINE

## 2024-04-16 PROCEDURE — 2580000003 HC RX 258: Performed by: CLINICAL NURSE SPECIALIST

## 2024-04-16 PROCEDURE — 93246 EXT ECG>7D<15D RECORDING: CPT

## 2024-04-16 PROCEDURE — 6360000002 HC RX W HCPCS: Performed by: CLINICAL NURSE SPECIALIST

## 2024-04-16 PROCEDURE — 36415 COLL VENOUS BLD VENIPUNCTURE: CPT

## 2024-04-16 PROCEDURE — 83880 ASSAY OF NATRIURETIC PEPTIDE: CPT

## 2024-04-16 PROCEDURE — 97530 THERAPEUTIC ACTIVITIES: CPT | Performed by: PHYSICAL THERAPIST

## 2024-04-16 PROCEDURE — 2580000003 HC RX 258: Performed by: INTERNAL MEDICINE

## 2024-04-16 PROCEDURE — 97535 SELF CARE MNGMENT TRAINING: CPT

## 2024-04-16 PROCEDURE — 80053 COMPREHEN METABOLIC PANEL: CPT

## 2024-04-16 PROCEDURE — 99239 HOSP IP/OBS DSCHRG MGMT >30: CPT | Performed by: INTERNAL MEDICINE

## 2024-04-16 PROCEDURE — 71045 X-RAY EXAM CHEST 1 VIEW: CPT

## 2024-04-16 PROCEDURE — 85025 COMPLETE CBC W/AUTO DIFF WBC: CPT

## 2024-04-16 PROCEDURE — 99233 SBSQ HOSP IP/OBS HIGH 50: CPT | Performed by: INTERNAL MEDICINE

## 2024-04-16 PROCEDURE — 82962 GLUCOSE BLOOD TEST: CPT

## 2024-04-16 PROCEDURE — 6360000002 HC RX W HCPCS: Performed by: INTERNAL MEDICINE

## 2024-04-16 PROCEDURE — 97110 THERAPEUTIC EXERCISES: CPT

## 2024-04-16 PROCEDURE — 97110 THERAPEUTIC EXERCISES: CPT | Performed by: PHYSICAL THERAPIST

## 2024-04-16 RX ORDER — INSULIN GLARGINE 100 [IU]/ML
8 INJECTION, SOLUTION SUBCUTANEOUS NIGHTLY
Qty: 10 ML | Refills: 3 | DISCHARGE
Start: 2024-04-16

## 2024-04-16 RX ORDER — INSULIN LISPRO 100 [IU]/ML
0-4 INJECTION, SOLUTION INTRAVENOUS; SUBCUTANEOUS
DISCHARGE
Start: 2024-04-16

## 2024-04-16 RX ORDER — INSULIN LISPRO 100 [IU]/ML
0-4 INJECTION, SOLUTION INTRAVENOUS; SUBCUTANEOUS NIGHTLY
DISCHARGE
Start: 2024-04-16

## 2024-04-16 RX ORDER — FUROSEMIDE 40 MG/1
40 TABLET ORAL DAILY
Qty: 60 TABLET | Refills: 3 | DISCHARGE
Start: 2024-04-17

## 2024-04-16 RX ORDER — MIDODRINE HYDROCHLORIDE 2.5 MG/1
2.5 TABLET ORAL
Qty: 90 TABLET | Refills: 3 | DISCHARGE
Start: 2024-04-16

## 2024-04-16 RX ADMIN — HEPARIN 100 UNITS: 100 SYRINGE at 08:52

## 2024-04-16 RX ADMIN — FUROSEMIDE 40 MG: 40 TABLET ORAL at 08:57

## 2024-04-16 RX ADMIN — POLYETHYLENE GLYCOL 3350 17 G: 17 POWDER, FOR SOLUTION ORAL at 08:51

## 2024-04-16 RX ADMIN — MIDODRINE HYDROCHLORIDE 2.5 MG: 5 TABLET ORAL at 08:51

## 2024-04-16 RX ADMIN — METOPROLOL TARTRATE 12.5 MG: 25 TABLET, FILM COATED ORAL at 08:51

## 2024-04-16 RX ADMIN — MEROPENEM 1000 MG: 1 INJECTION, POWDER, FOR SOLUTION INTRAVENOUS at 02:08

## 2024-04-16 RX ADMIN — MIDODRINE HYDROCHLORIDE 2.5 MG: 5 TABLET ORAL at 14:03

## 2024-04-16 RX ADMIN — DOCUSATE SODIUM 100 MG: 100 CAPSULE, LIQUID FILLED ORAL at 08:51

## 2024-04-16 RX ADMIN — Medication 10 ML: at 08:52

## 2024-04-16 RX ADMIN — MEROPENEM 1000 MG: 1 INJECTION, POWDER, FOR SOLUTION INTRAVENOUS at 08:54

## 2024-04-16 RX ADMIN — INSULIN LISPRO 1 UNITS: 100 INJECTION, SOLUTION INTRAVENOUS; SUBCUTANEOUS at 12:04

## 2024-04-16 RX ADMIN — ANTI-FUNGAL POWDER MICONAZOLE NITRATE TALC FREE: 1.42 POWDER TOPICAL at 08:52

## 2024-04-16 RX ADMIN — ACETAMINOPHEN 650 MG: 325 TABLET ORAL at 08:51

## 2024-04-16 ASSESSMENT — PAIN SCALES - WONG BAKER: WONGBAKER_NUMERICALRESPONSE: NO HURT

## 2024-04-16 NOTE — PROGRESS NOTES
[facility] Hospitalist   Progress Note    Admitting Date and Time: 4/9/2024  9:28 AM  Admit Dx: Sepsis due to pneumonia (HCC) [J18.9, A41.9]    Subjective:    4/10: Pt admitted yesterday with sepsis and septic encephalopathy due to urinary tract infection.  Urine cultures growing E. coli on preliminary result.  Blood cultures positive for gram-negative rods on preliminary result.    Today she is awake, alert, denies any complaints.  Mentation appears to be normal.  Feels that she is about her baseline.     4/11: Patient sitting up in bed, awake, alert.  She feels well today, denies any complaints at this time.  Urine culture showing ESBL producing E. coli, blood culture showing E. coli on preliminary result which is presumably the same organism.    4/12: Patient sitting up in bed.  Her only complaint is to be repositioned.  RN and I reposition patient, and she is now comfortable.  Abdominal soreness has improved.  She is tolerating IV meropenem.    Overnight, patient had a run of SVT requiring IV diltiazem but this has since been weaned off.    4/13: Patient sitting up in bed, awake, alert.  She does not offer any complaints, but on review of systems does endorse palpitations.  Overnight 4/11, she had a run of SVT and was given a dose of diltiazem subsequently converted to sinus rhythm.  This morning, heart rate is elevated as high as 150s with twelve-lead EKG showing SVT.  She is given diltiazem bolus IV along with IV albumin, heart rate did not improve much but blood pressure remained stable.  Started on oral metoprolol tartrate without improvement in heart rate.  Blood pressures remained stable.    4/14: Overnight, patient had episodes of intermittent bradycardia with heart rate as low as the 30s.  This morning she had CTA of the chest which did not show evidence of PE, but does show bilateral pleural effusions which are new.  Today she denies any new complaints, just feels fatigued.    4/15:  
       [facility] Hospitalist   Progress Note    Admitting Date and Time: 4/9/2024  9:28 AM  Admit Dx: Sepsis due to pneumonia (HCC) [J18.9, A41.9]    Subjective:    4/10: Pt admitted yesterday with sepsis and septic encephalopathy due to urinary tract infection.  Urine cultures growing E. coli on preliminary result.  Blood cultures positive for gram-negative rods on preliminary result.    Today she is awake, alert, denies any complaints.  Mentation appears to be normal.  Feels that she is about her baseline.     4/11: Patient sitting up in bed, awake, alert.  She feels well today, denies any complaints at this time.  Urine culture showing ESBL producing E. coli, blood culture showing E. coli on preliminary result which is presumably the same organism.    4/12: Patient sitting up in bed.  Her only complaint is to be repositioned.  RN and I reposition patient, and she is now comfortable.  Abdominal soreness has improved.  She is tolerating IV meropenem.    Overnight, patient had a run of SVT requiring IV diltiazem but this has since been weaned off.    ROS: denies fever, chills, cp, sob, n/v, HA unless stated above.     meropenem  1,000 mg IntraVENous Q8H    midodrine  5 mg Oral TID WC    [Held by provider] furosemide  20 mg Oral Daily    montelukast  10 mg Oral Nightly    sodium chloride flush  5-40 mL IntraVENous 2 times per day    enoxaparin  40 mg SubCUTAneous Q24H    insulin lispro  0-4 Units SubCUTAneous TID WC    insulin lispro  0-4 Units SubCUTAneous Nightly    miconazole   Topical BID    insulin glargine  5 Units SubCUTAneous Nightly     sodium chloride flush, 5-40 mL, PRN  sodium chloride, , PRN  ondansetron, 4 mg, Q8H PRN   Or  ondansetron, 4 mg, Q6H PRN  polyethylene glycol, 17 g, Daily PRN  acetaminophen, 650 mg, Q6H PRN   Or  acetaminophen, 650 mg, Q6H PRN  glucose, 4 tablet, PRN  dextrose bolus, 125 mL, PRN   Or  dextrose bolus, 250 mL, PRN  glucagon (rDNA), 1 mg, PRN  dextrose, , Continuous 
       [facility] Hospitalist   Progress Note    Admitting Date and Time: 4/9/2024  9:28 AM  Admit Dx: Sepsis due to pneumonia (HCC) [J18.9, A41.9]    Subjective:    Pt mated yesterday with sepsis and septic encephalopathy due to urinary tract infection.  Urine cultures growing E. coli on preliminary result.  Blood cultures positive for gram-negative rods on preliminary result.    Today she is awake, alert, denies any complaints.  Mentation appears to be normal.  Feels that she is about her baseline.  Her    ROS: denies fever, chills, cp, sob, n/v, HA unless stated above.     [Held by provider] furosemide  20 mg Oral Daily    montelukast  10 mg Oral Nightly    sodium chloride flush  5-40 mL IntraVENous 2 times per day    enoxaparin  40 mg SubCUTAneous Q24H    insulin lispro  0-4 Units SubCUTAneous TID WC    insulin lispro  0-4 Units SubCUTAneous Nightly    cefepime  2,000 mg IntraVENous Q8H    vancomycin  750 mg IntraVENous Q12H    miconazole   Topical BID    insulin glargine  5 Units SubCUTAneous Nightly     sodium chloride flush, 5-40 mL, PRN  sodium chloride, , PRN  ondansetron, 4 mg, Q8H PRN   Or  ondansetron, 4 mg, Q6H PRN  polyethylene glycol, 17 g, Daily PRN  acetaminophen, 650 mg, Q6H PRN   Or  acetaminophen, 650 mg, Q6H PRN  glucose, 4 tablet, PRN  dextrose bolus, 125 mL, PRN   Or  dextrose bolus, 250 mL, PRN  glucagon (rDNA), 1 mg, PRN  dextrose, , Continuous PRN         Objective:    /66   Pulse 85   Temp 97.7 °F (36.5 °C) (Oral)   Resp 24   Ht 1.626 m (5' 4\")   Wt 80.8 kg (178 lb 3.2 oz)   SpO2 98%   BMI 30.59 kg/m²   General Appearance: Chronically ill and frail appearing elderly female, rigors have resolved, oriented x 3, not in any distress  Skin: warm and dry, chronic venous stasis changes of the lower extremities  Head: normocephalic and atraumatic  Eyes: pupils equal, round, and reactive to light, extraocular eye movements intact, conjunctivae normal  Neck: Torticollis to 
       [facility] Hospitalist   Progress Note    Admitting Date and Time: 4/9/2024  9:28 AM  Admit Dx: Sepsis due to pneumonia (HCC) [J18.9, A41.9]    Subjective:    4/10: Pt admitted yesterday with sepsis and septic encephalopathy due to urinary tract infection.  Urine cultures growing E. coli on preliminary result.  Blood cultures positive for gram-negative rods on preliminary result.    Today she is awake, alert, denies any complaints.  Mentation appears to be normal.  Feels that she is about her baseline.     4/11: Patient sitting up in bed, awake, alert.  She feels well today, denies any complaints at this time.  Urine culture showing ESBL producing E. coli, blood culture showing E. coli on preliminary result which is presumably the same organism.    4/12: Patient sitting up in bed.  Her only complaint is to be repositioned.  RN and I reposition patient, and she is now comfortable.  Abdominal soreness has improved.  She is tolerating IV meropenem.    Overnight, patient had a run of SVT requiring IV diltiazem but this has since been weaned off.    4/13: Patient sitting up in bed, awake, alert.  She does not offer any complaints, but on review of systems does endorse palpitations.  Overnight 4/11, she had a run of SVT and was given a dose of diltiazem subsequently converted to sinus rhythm.  This morning, heart rate is elevated as high as 150s with twelve-lead EKG showing SVT.  She is given diltiazem bolus IV along with IV albumin, heart rate did not improve much but blood pressure remained stable.  Started on oral metoprolol tartrate without improvement in heart rate.  Blood pressures remained stable.    ROS: denies fever, chills, cp, sob, n/v, HA unless stated above.     metoprolol  2.5 mg IntraVENous Once    albumin human 25%  25 g IntraVENous Once    polyethylene glycol  17 g Oral Daily    docusate sodium  100 mg Oral Daily    midodrine  2.5 mg Oral TID WC    sodium chloride flush  5-40 mL 
       [facility] Hospitalist   Progress Note    Admitting Date and Time: 4/9/2024  9:28 AM  Admit Dx: Sepsis due to pneumonia (HCC) [J18.9, A41.9]    Subjective:    4/10: Pt admitted yesterday with sepsis and septic encephalopathy due to urinary tract infection.  Urine cultures growing E. coli on preliminary result.  Blood cultures positive for gram-negative rods on preliminary result.    Today she is awake, alert, denies any complaints.  Mentation appears to be normal.  Feels that she is about her baseline.     4/11: Patient sitting up in bed, awake, alert.  She feels well today, denies any complaints at this time.  Urine culture showing ESBL producing E. coli, blood culture showing E. coli on preliminary result which is presumably the same organism.    ROS: denies fever, chills, cp, sob, n/v, HA unless stated above.     [Held by provider] furosemide  20 mg Oral Daily    montelukast  10 mg Oral Nightly    sodium chloride flush  5-40 mL IntraVENous 2 times per day    enoxaparin  40 mg SubCUTAneous Q24H    insulin lispro  0-4 Units SubCUTAneous TID WC    insulin lispro  0-4 Units SubCUTAneous Nightly    cefepime  2,000 mg IntraVENous Q8H    vancomycin  750 mg IntraVENous Q12H    miconazole   Topical BID    insulin glargine  5 Units SubCUTAneous Nightly     sodium chloride flush, 5-40 mL, PRN  sodium chloride, , PRN  ondansetron, 4 mg, Q8H PRN   Or  ondansetron, 4 mg, Q6H PRN  polyethylene glycol, 17 g, Daily PRN  acetaminophen, 650 mg, Q6H PRN   Or  acetaminophen, 650 mg, Q6H PRN  glucose, 4 tablet, PRN  dextrose bolus, 125 mL, PRN   Or  dextrose bolus, 250 mL, PRN  glucagon (rDNA), 1 mg, PRN  dextrose, , Continuous PRN         Objective:    /67   Pulse 90   Temp 99.4 °F (37.4 °C) (Oral)   Resp 16   Ht 1.626 m (5' 4\")   Wt 80.8 kg (178 lb 3.2 oz)   SpO2 90%   BMI 30.59 kg/m²   General Appearance: Chronically ill and frail appearing elderly female, oriented x 3, not in any distress  Skin: warm 
    INPATIENT CARDIOLOGY FOLLOW-UP    Name: Olga Downey    Age: 68 y.o.    Date of Admission: 4/9/2024  9:28 AM    Date of Service: 4/14/2024    Primary Cardiologist: Known to me    Chief Complaint: Follow-up for SVT    Interim History:  No further SVT.  Complaining of some vague chest discomfort and shortness of breath.  She is back on oxygen.    Net - 3.7 L according to I's and O's    Review of Systems:   Negative except as described above    Problem List:  Patient Active Problem List   Diagnosis    Knee pain    Primary osteoarthritis of left knee    Status post total knee replacement    Rheumatoid arthritis of hand ([facility])    Chronic venous hypertension w/ulcer and inflammation involv right side ([facility])    C6 radiculopathy    Rheumatoid arthritis involving vertebra with positive rheumatoid factor ([facility])    Decreased  strength    Non-healing ulcer of foot, left, with fat layer exposed ([facility])    Left leg cellulitis    Rotational deformity of cervical spine    Moderate protein-calorie malnutrition ([facility])    Type 2 diabetes mellitus with hyperglycemia, without long-term current use of insulin ([facility])    COVID-19    Sepsis due to urinary tract infection ([facility])    Septic encephalopathy    Torticollis    Gram-negative bacteremia    Sepsis due to Escherichia coli with encephalopathy without septic shock ([facility])    Paroxysmal SVT (supraventricular tachycardia) ([facility])    Bilateral pleural effusion       Current Medications:    Current Facility-Administered Medications:     metoprolol tartrate (LOPRESSOR) tablet 12.5 mg, 12.5 mg, Oral, BID, Bright Cox MD    albumin human 25% IV solution 12.5 g, 12.5 g, IntraVENous, Q6H, Santos Strong DO    furosemide (LASIX) injection 20 mg, 20 mg, IntraVENous, BID, Santos Strong DO    polyethylene glycol (GLYCOLAX) packet 17 g, 17 g, Oral, Daily, Santos Strong DO, 17 g at 04/13/24 0914    docusate sodium (COLACE) capsule 100 mg, 100 mg, Oral, Daily, Santos Strong 
    INPATIENT CARDIOLOGY FOLLOW-UP    Name: Olga Downey    Age: 68 y.o.    Date of Admission: 4/9/2024  9:28 AM    Date of Service: 4/14/2024    Primary Cardiologist: Known to me    Chief Complaint: Follow-up for SVT    Interim History:  No further SVT.  However significantly bradycardic to the 30s and 40s while awake.  Has some shortness of breath.    Review of Systems:   Negative except as described above    Problem List:  Patient Active Problem List   Diagnosis    Knee pain    Primary osteoarthritis of left knee    Status post total knee replacement    Rheumatoid arthritis of hand (HCC)    Chronic venous hypertension w/ulcer and inflammation involv right side ([facility])    C6 radiculopathy    Rheumatoid arthritis involving vertebra with positive rheumatoid factor ([facility])    Decreased  strength    Non-healing ulcer of foot, left, with fat layer exposed ([facility])    Left leg cellulitis    Rotational deformity of cervical spine    Moderate protein-calorie malnutrition ([facility])    Type 2 diabetes mellitus with hyperglycemia, without long-term current use of insulin ([facility])    COVID-19    Sepsis due to urinary tract infection ([facility])    Septic encephalopathy    Torticollis    Gram-negative bacteremia    Sepsis due to Escherichia coli with encephalopathy without septic shock ([facility])    Paroxysmal SVT (supraventricular tachycardia) ([facility])    Bilateral pleural effusion       Current Medications:    Current Facility-Administered Medications:     metoprolol tartrate (LOPRESSOR) tablet 12.5 mg, 12.5 mg, Oral, BID, Bright Cox MD    albumin human 25% IV solution 12.5 g, 12.5 g, IntraVENous, Q6H, Santos Strong DO    furosemide (LASIX) injection 20 mg, 20 mg, IntraVENous, BID, Santos Strong DO    polyethylene glycol (GLYCOLAX) packet 17 g, 17 g, Oral, Daily, Santos Strong DO, 17 g at 04/13/24 0914    docusate sodium (COLACE) capsule 100 mg, 100 mg, Oral, Daily, Santos Strong DO, 100 mg at 04/13/24 0913    
    INPATIENT CARDIOLOGY FOLLOW-UP    Name: Olga Downey    Age: 68 y.o.    Date of Admission: 4/9/2024  9:28 AM    Date of Service: 4/14/2024    Primary Cardiologist: Known to me    Chief Complaint: Follow-up for SVT    Interim History:  No further SVT.  Still intermittently bradycardic 40s while awake.  Denies chest pain.  Breathing improved.    Net -1.6 L according to I's and O's    Review of Systems:   Negative except as described above    Problem List:  Patient Active Problem List   Diagnosis    Knee pain    Primary osteoarthritis of left knee    Status post total knee replacement    Rheumatoid arthritis of hand (HCC)    Chronic venous hypertension w/ulcer and inflammation involv right side (HCC)    C6 radiculopathy    Rheumatoid arthritis involving vertebra with positive rheumatoid factor (HCC)    Decreased  strength    Non-healing ulcer of foot, left, with fat layer exposed ([facility])    Left leg cellulitis    Rotational deformity of cervical spine    Moderate protein-calorie malnutrition ([facility])    Type 2 diabetes mellitus with hyperglycemia, without long-term current use of insulin ([facility])    COVID-19    Sepsis due to urinary tract infection ([facility])    Septic encephalopathy    Torticollis    Gram-negative bacteremia    Sepsis due to Escherichia coli with encephalopathy without septic shock ([facility])    Paroxysmal SVT (supraventricular tachycardia) ([facility])    Bilateral pleural effusion       Current Medications:    Current Facility-Administered Medications:     metoprolol tartrate (LOPRESSOR) tablet 12.5 mg, 12.5 mg, Oral, BID, Bright Cox MD    albumin human 25% IV solution 12.5 g, 12.5 g, IntraVENous, Q6H, Santos Strong DO    furosemide (LASIX) injection 20 mg, 20 mg, IntraVENous, BID, Santos Strong DO    polyethylene glycol (GLYCOLAX) packet 17 g, 17 g, Oral, Daily, Santos Strong DO, 17 g at 04/13/24 0914    docusate sodium (COLACE) capsule 100 mg, 100 mg, Oral, Daily, Santos Strong DO, 
  Pharmacy Note - Renal Dosing and Extended Infusion Beta-Lactam Adjustment    Cefepime 2000mg Q12h for treatment of Hospital acquired pneumonia. Per [facility] Renal Dose Adjustment Policy and Extended Infusion Beta-Lactam Policy, cefepime will be changed to 2000mg load followed by 2000mg Q8h extended infusion    Estimated Creatinine Clearance: Estimated creatinine clearance is 75 mL/min based on weight of 87 kg, Scr= 1.    BMI: Body mass index is 32.96 kg/m².    Please call with any questions.    Thank you,    Juan Liu, RPH    
 notified of discharge. Nurse to nurse given to [facility]. Patient was picked up and and discharged with discharge instructions and all belongings.   
14 day ZIO XT applied on 04/16/2024. Instructions given. Isabelle Dooley    
4 Eyes Skin Assessment     NAME:  Olga Downey  YOB: 1955  MEDICAL RECORD NUMBER:  [id number]    The patient is being assessed for  Admission    I agree that at least one RN has performed a thorough Head to Toe Skin Assessment on the patient. ALL assessment sites listed below have been assessed.      Areas assessed by both nurses:    Head, Face, Ears, Shoulders, Back, Chest, Arms, Elbows, Hands, Sacrum. Buttock, Coccyx, Ischium, Legs. Feet and Heels, and Under Medical Devices         Does the Patient have a Wound? No noted wound(s)     Redness rt neck   Bilat axillary   Dryness and hemorrhoids coccyx   Rodrigue Prevention initiated by RN: Yes  Wound Care Orders initiated by RN: No    Pressure Injury (Stage 3,4, Unstageable, DTI, NWPT, and Complex wounds) if present, place Wound referral order by RN under : No    New Ostomies, if present place, Ostomy referral order under : No     Nurse 1 eSignature: Electronically signed by Issi Kemp RN on 4/9/24 at 4:20 PM EDT    **SHARE this note so that the co-signing nurse can place an eSignature**    Nurse 2 eSignature: Electronically signed by Latisha Grubbs RN on 4/9/24 at 6:05 PM EDT    
Change of shift patient remains in SR. Heart Rate 76.   
Comprehensive Nutrition Assessment    Type and Reason for Visit:  Initial, Positive Nutrition Screen (LOS)    Nutrition Recommendations/Plan:   Continue Current Diet  Continue ONS (diabetic) TID     Malnutrition Assessment:  Malnutrition Status:  No malnutrition (04/16/24 1021)    Context:  Acute Illness     Findings of the 6 clinical characteristics of malnutrition:  Energy Intake:  Mild decrease in energy intake (Comment) (poor po intake prior to admission d/t AMS)  Weight Loss:  No significant weight loss     Body Fat Loss:  No significant body fat loss     Muscle Mass Loss:  No significant muscle mass loss    Fluid Accumulation:  Mild Extremities   Strength:  Not Performed    Nutrition Assessment:    Pt admit w/ Sepsis 2/2 UTI, AMS and bilateral pleural effusions. PMHx: Arthritis, Diabetes, Heart Murmur, Rhuematoid arthritis and Torticollis.    Nutrition Related Findings:    A/O x2, I/O -3663, abd distended, bowel sounds active x4 active, BLUE edema +1 Pitting, Contracture, Hypotension, Tachycardia. Pt po intake 25-50% per nursing note. ONS ordered per MD (diabetic). Continue ONS, continue inpatient monitoring, f/up per policy. Wound Type: None       Current Nutrition Intake & Therapies:    Average Meal Intake: 26-50%  Average Supplements Intake: 1-25%  ADULT ORAL NUTRITION SUPPLEMENT; Breakfast, Lunch; Diabetic Oral Supplement  ADULT DIET; Regular; 4 carb choices (60 gm/meal); Low Sodium (2 gm)    Anthropometric Measures:  Height: 162.6 cm (5' 4.02\")  Ideal Body Weight (IBW): 120 lbs (55 kg)    Admission Body Weight: 80.7 kg (177 lb 14.6 oz)  Current Body Weight: 80.7 kg (177 lb 14.6 oz) (04/15/24), 148.3 % IBW. Weight Source: Bed Scale  Current BMI (kg/m2): 30.5  Usual Body Weight: 88 kg (194 lb 0.1 oz) (01/09/21)  % Weight Change (Calculated): -8.3  Weight Adjustment For: No Adjustment         BMI Categories: Obese Class 1 (BMI 30.0-34.9)    Estimated Daily Nutrient Needs:  Energy Requirements Based On: 
OCCUPATIONAL THERAPY INITIAL EVALUATION    [facility]  [address] [place]        Date:2024                                                  Patient Name: Olga Downey    MRN: [id number]    : 1955    Room: [address] [place] [address]      Evaluating OT: Chet Peña OTR/L; #[id number]     Referring Provider and Specific Provider Orders/Date:      04/10/24 134  OT eval and treat  Start:  04/10/24 1345,   End:  04/10/24 134,   ONE TIME,   Standing Count:  1 Occurrences,   R         Santos Strong,       Placement Recommendation: Subacute Rehab       Diagnosis:   1. Sepsis, due to unspecified organism, unspecified whether acute organ dysfunction present (HCC)    2. Pneumonia due to infectious organism, unspecified laterality, unspecified part of lung    3. Acute cystitis with hematuria         Surgery: None      Pertinent Medical History:       Past Medical History:   Diagnosis Date    Arthritis     rheumatoid    Diabetes     borderline; A1C 6.3    Heart murmur     History of blood transfusion     Leg edema     Osteoarthritis     Rheumatoid arthritis (HCC)     Torticollis          Past Surgical History:   Procedure Laterality Date    ANKLE FRACTURE SURGERY      L. ankle ORIF    CARPAL TUNNEL RELEASE  2003    R. wrist    COLONOSCOPY  2001    JOINT REPLACEMENT      bilateral knees  and     KNEE SURGERY  2010    R. total knee arthroplasty    DC ADJ TISS XFER HEAD,FAC,HAND <10SQCM Left 10/19/2018    AMPUTATION LEFT FOURTH TOE. performed by Rocky Cottrell DPM at [facility] OR    TOE AMPUTATION Left     left fourth toe     TONSILLECTOMY          Precautions:  Fall Risk, Up with Assistance, Tachy, Sepsis, PNA, UTI, Severe Torticollis      Assessment of current deficits:     [x] Functional mobility  [x]ADLs  [x] Strength               []Cognition    [x] Functional transfers   [x] IADLs         [] Safety Awareness   [x]Endurance    [] Fine 
Pharmacy Consultation Note  (Antibiotic Dosing and Monitoring)    Initial consult date: 4/9/2024  Consulting physician/provider: Dr. Strong  Drug: Vancomycin  Indication: Pneumonia (HAP)    Age/  Gender Height Weight IBW  Allergy Information   68 y.o./female 162.6 cm (5' 4\") 87.1 kg (192 lb)     Ideal body weight: 54.7 kg (120 lb 9.5 oz)  Adjusted ideal body weight: 65.2 kg (143 lb 10.2 oz)   Iodine      Renal Function:  Recent Labs     04/09/24  0945   BUN 28*   CREATININE 1.0     No intake or output data in the 24 hours ending 04/09/24 1508    Vancomycin Monitoring:  Trough:  No results for input(s): \"VANCOTROUGH\" in the last 72 hours.  Random:  No results for input(s): \"VANCORANDOM\" in the last 72 hours.    Vancomycin Administration Times:  Recent vancomycin administrations        No vancomycin IV orders with administrations found.                    Assessment:  Patient is a 68 y.o. female who has been initiated on vancomycin  Estimated Creatinine Clearance: 56 mL/min (based on SCr of 1 mg/dL).  To dose vancomycin, pharmacy will be utilizing [employer] calculation software for goal AUC/CLAIR 400-600 mg/L-hr (predicted AUC/CLAIR = 510, Tr =16.2 mcg/mL)    Plan:  Will continue vancomycin 750 mg IV every 12 hours  Will check vancomycin levels when appropriate  Will continue to monitor renal function   Pharmacy to follow    Rand Vivas, PharmD, BCPS 4/9/2024 3:08 PM   Ext: [phone]    W: [phone]    
Pharmacy Consultation Note  (Antibiotic Dosing and Monitoring)    Initial consult date: 4/9/2024  Consulting physician/provider: Dr. Strong  Drug: Vancomycin  Indication: Pneumonia (HAP)    Age/  Gender Height Weight IBW  Allergy Information   68 y.o./female 162.6 cm (5' 4\") 87.1 kg (192 lb)     Ideal body weight: 54.7 kg (120 lb 9.5 oz)  Adjusted ideal body weight: 65.2 kg (143 lb 10.2 oz)   Iodine      Renal Function:  Recent Labs     04/09/24  0945 04/10/24  0637   BUN 28* 24*   CREATININE 1.0 1.0         Intake/Output Summary (Last 24 hours) at 4/10/2024 1333  Last data filed at 4/10/2024 1329  Gross per 24 hour   Intake 420 ml   Output 200 ml   Net 220 ml       Vancomycin Monitoring:  Trough:  No results for input(s): \"VANCOTROUGH\" in the last 72 hours.  Random:  No results for input(s): \"VANCORANDOM\" in the last 72 hours.    Recent vancomycin administrations                     vancomycin (VANCOCIN) 750 mg in sodium chloride 0.9 % 250 mL IVPB (mg) 750 mg New Bag 04/10/24 0337     750 mg New Bag 04/09/24 1701             Assessment:  Patient is a 68 y.o. female who has been initiated on vancomycin  Estimated Creatinine Clearance: 56 mL/min (based on SCr of 1 mg/dL).  To dose vancomycin, pharmacy will be utilizing ReferStar calculation software for goal AUC/CLAIR 400-600 mg/L-hr (predicted AUC/CLAIR = 510, Tr =16.2 mcg/mL)    Plan:  Will continue vancomycin 750 mg IV every 12 hours  Trough tomorrow @ 0300. HOLD dose for level > 20 mcg/mL  Will continue to monitor renal function   Pharmacy to follow    Rand Vivas, LudinD, BCPS 4/10/2024 1:33 PM   Ext: [phone]    W: [phone]    
Pharmacy Consultation Note  (Antibiotic Dosing and Monitoring)    Vancomycin has been discontinued; pharmacy will sign-off.  Please reconsult if needed.    Thank you,  Sheila Allen, PharmD 4/11/2024 9:13 AM  NORMA: [phone]    
[facility]  [place]  Progress Note    CC: ESBL+ sepsis   Face to face encounter   SUBJECTIVE:  4/14/2024  Patient is resting in bed- has been afebrile. No distress. On 2 liters nasal canula.   Tolerating antibiotics.  Reports she has not had a bowel movement.   Has KUB on 4/13/2024 4/13/2024  C/o sob on o2 ha no f/c/n/v/d    4/12  Patient is in bed - no fevers. On room air.   Patient is tolerating medications. No reported adverse drug reactions.  No nausea, vomiting, diarrhea.    Medications:  Scheduled Meds:   metoprolol tartrate  12.5 mg Oral BID    albumin human 25%  12.5 g IntraVENous Q6H    furosemide  20 mg IntraVENous BID    polyethylene glycol  17 g Oral Daily    docusate sodium  100 mg Oral Daily    midodrine  2.5 mg Oral TID WC    sodium chloride flush  5-40 mL IntraVENous 2 times per day    heparin flush  1 mL IntraVENous 2 times per day    meropenem  1,000 mg IntraVENous Q8H    [Held by provider] furosemide  20 mg Oral Daily    montelukast  10 mg Oral Nightly    sodium chloride flush  5-40 mL IntraVENous 2 times per day    enoxaparin  40 mg SubCUTAneous Q24H    insulin lispro  0-4 Units SubCUTAneous TID WC    insulin lispro  0-4 Units SubCUTAneous Nightly    miconazole   Topical BID    insulin glargine  5 Units SubCUTAneous Nightly     Continuous Infusions:   sodium chloride      sodium chloride      dextrose       PRN Meds:sodium chloride flush, sodium chloride, heparin flush, sodium chloride flush, sodium chloride, ondansetron **OR** ondansetron, acetaminophen **OR** acetaminophen, glucose, dextrose bolus **OR** dextrose bolus, glucagon (rDNA), dextrose  OBJECTIVE:  Patient Vitals for the past 24 hrs:   BP Temp Temp src Pulse Resp SpO2   04/14/24 0926 -- -- -- 77 -- --   04/14/24 0645 137/68 97.4 °F (36.3 °C) Oral 51 20 96 %   04/14/24 0400 (!) 128/59 97.9 °F (36.6 °C) Oral 53 18 95 %   04/14/24 0000 115/62 97.7 °F (36.5 °C) Oral 61 20 94 %   04/13/24 2030 132/68 
Pt in SVT sustaining 160's. Asymptomatic. /63. Dr. Hager notified via phone call. EKG obtained. 500 cc bolus admin c 5 mg IV Lopressor. Pt cont to sustain in the 140's&Dr. Hager updated. New IV access placed in left hand. New order received to admin Cardizem bolus&start gtt, see MAR.      0130 Pt tolerating gtt, ST c PVC's. Rate fluctuating between 100-110 c pt resting in bed. /61.    0217 Pt rate hitting low 90's-low 100's, gtt decreased to 2.5 mg/hr. Dr. Hager updated on pt's improvement via secure message.     Spoke c Dr. Hager, SR c frequent PVC's. HR 83. Verbal order to stop gtt at this time and monitor for changes. Will pass off to day shift RN.   
[facility]  LAURENIDA  Progress Note    CC: ESBL+ sepsis   Face to face encounter   SUBJECTIVE:  4/12/2024  Patient is in bed - no fevers. On room air.   Patient is tolerating medications. No reported adverse drug reactions.  No nausea, vomiting, diarrhea.    Medications:  Scheduled Meds:   polyethylene glycol  17 g Oral Daily    docusate sodium  100 mg Oral Daily    meropenem  1,000 mg IntraVENous Q8H    midodrine  5 mg Oral TID WC    [Held by provider] furosemide  20 mg Oral Daily    montelukast  10 mg Oral Nightly    sodium chloride flush  5-40 mL IntraVENous 2 times per day    enoxaparin  40 mg SubCUTAneous Q24H    insulin lispro  0-4 Units SubCUTAneous TID WC    insulin lispro  0-4 Units SubCUTAneous Nightly    miconazole   Topical BID    insulin glargine  5 Units SubCUTAneous Nightly     Continuous Infusions:   dilTIAZem Stopped (04/12/24 0705)    sodium chloride      dextrose       PRN Meds:sodium chloride flush, sodium chloride, ondansetron **OR** ondansetron, acetaminophen **OR** acetaminophen, glucose, dextrose bolus **OR** dextrose bolus, glucagon (rDNA), dextrose  OBJECTIVE:  Patient Vitals for the past 24 hrs:   BP Temp Temp src Pulse Resp SpO2   04/12/24 1237 -- 99.3 °F (37.4 °C) Axillary 86 18 93 %   04/12/24 0511 121/64 99.4 °F (37.4 °C) Axillary 88 17 90 %   04/12/24 0332 (!) 106/57 98.1 °F (36.7 °C) Axillary 98 17 95 %   04/12/24 0130 110/61 -- -- (!) 107 16 --   04/12/24 0054 -- -- -- 98 -- --   04/12/24 0038 -- -- -- (!) 130 -- --   04/12/24 0009 109/64 98.8 °F (37.1 °C) Axillary (!) 144 17 90 %   04/11/24 2316 131/63 -- -- (!) 161 -- --   04/11/24 2049 (!) 124/58 98 °F (36.7 °C) Oral 90 17 90 %   04/11/24 1737 122/70 -- -- -- -- --   04/11/24 1441 (!) 81/28 97.7 °F (36.5 °C) Oral 91 18 93 %     Constitutional: The patient is awake, alert, and oriented. Sitting up in bed.   Skin: Warm and dry. No rashes were noted.   Head: Eyes show round, and reactive pupils. No 
[facility]  [place]  Progress Note    CC: ESBL+ sepsis   Face to face encounter   SUBJECTIVE:  4/15/2024  Patient is in bed. Has been afebrile. No distress  Has moved her bowels. Tolerating antibiotics.     4/14/2024  Patient is resting in bed- has been afebrile. No distress. On 2 liters nasal canula.   Tolerating antibiotics.  Reports she has not had a bowel movement.   Has KUB on 4/13/2024 4/13/2024  C/o sob on o2 ha no f/c/n/v/d    4/12  Patient is in bed - no fevers. On room air.   Patient is tolerating medications. No reported adverse drug reactions.  No nausea, vomiting, diarrhea.    Medications:  Scheduled Meds:   [START ON 4/16/2024] furosemide  40 mg Oral Daily    metoprolol tartrate  12.5 mg Oral BID    furosemide  20 mg IntraVENous BID    insulin glargine  8 Units SubCUTAneous Nightly    polyethylene glycol  17 g Oral Daily    docusate sodium  100 mg Oral Daily    midodrine  2.5 mg Oral TID WC    sodium chloride flush  5-40 mL IntraVENous 2 times per day    heparin flush  1 mL IntraVENous 2 times per day    meropenem  1,000 mg IntraVENous Q8H    montelukast  10 mg Oral Nightly    sodium chloride flush  5-40 mL IntraVENous 2 times per day    enoxaparin  40 mg SubCUTAneous Q24H    insulin lispro  0-4 Units SubCUTAneous TID WC    insulin lispro  0-4 Units SubCUTAneous Nightly    miconazole   Topical BID     Continuous Infusions:   sodium chloride      sodium chloride      dextrose       PRN Meds:perflutren lipid microspheres, sodium chloride flush, sodium chloride, heparin flush, sodium chloride flush, sodium chloride, ondansetron **OR** ondansetron, acetaminophen **OR** acetaminophen, glucose, dextrose bolus **OR** dextrose bolus, glucagon (rDNA), dextrose  OBJECTIVE:  Patient Vitals for the past 24 hrs:   BP Temp Temp src Pulse Resp SpO2 Height Weight   04/15/24 1217 139/65 -- -- -- -- -- 1.626 m (5' 4\") 80.7 kg (178 lb)   04/15/24 1205 139/65 98.7 °F (37.1 °C) Oral 93 
04/16/2024 10:06 AM    BILITOT 0.3 04/16/2024 10:06 AM    ALKPHOS 85 04/16/2024 10:06 AM    AST 9 04/16/2024 10:06 AM    ALT 11 04/16/2024 10:06 AM     Radiology:  4/9/2024 chest x-ray     Impression:        1. Cardiomegaly with pulmonary vascular congestion.  2. Hazy density in the left lung base may represent atelectasis versus  developing infiltrate.       Microbiology:   4/9/2024- blood cx- ESBL+ e.coli   4/9/2024- urine cx- ESBL+ e.coli     ASSESSMENT:  ESBL+ E.coli sepsis   Complicated UTI   Leukocytosis - improved   SOB    PLAN:  Discussed with Dr. Mahoney   Continue meropenem  Can discharge with invanz/ meropenem for 14 days   Micostatin powder   Procal- 2.85   bladder scan about 40cc  Monitor labs- WBC- 10.4    Monitor temps   Med rec done from meropenem for another 10 days   Discharge plan- back to [place] later today     Labs, imaging, and medical records/notes were personally reviewed.  Pt had the opportunity to ask questions.    Electronically signed by YOKO Espinoza on 4/16/2024 at 12:44 PM            
REPLACEMENT      bilateral knees 2010 and 2011    KNEE SURGERY  06/09/2010    R. total knee arthroplasty    PA ADJT TIS TRNS/REARGMT F/C/C/M/N/A/G/H/F 10SQCM/< Left 10/19/2018    AMPUTATION LEFT FOURTH TOE. performed by Rocky Cottrell DPM at [facility] OR    TOE AMPUTATION Left     left fourth toe     TONSILLECTOMY  1966       SUBJECTIVE:    Precautions: Up with assistance, falls and Tachy, Sepsis, PNA, UTI, Severe Torticollis    Social history: Patient lives at [facility]    Equipment owned: Equipment at Facility    AM-[facility] Basic Mobility       AM-PAC Basic Mobility - Inpatient   How much help is needed turning from your back to your side while in a flat bed without using bedrails?: A Lot  How much help is needed moving from lying on your back to sitting on the side of a flat bed without using bedrails?: Total  How much help is needed moving to and from a bed to a chair?: Total  How much help is needed standing up from a chair using your arms?: Total  How much help is needed walking in hospital room?: Total  How much help is needed climbing 3-5 steps with a railing?: Total  AM-PAC Inpatient Mobility Raw Score : 7  AM-PAC Inpatient T-Scale Score : 26.42  Mobility Inpatient CMS 0-100% Score: 92.36  Mobility Inpatient CMS G-Code Modifier : CM    Nursing cleared patient for PT treatment.      OBJECTIVE:   Initial Evaluation  Date: 4/11/2024 Treatment Date:  4/16/2024     Short Term/ Long Term   Goals   Was pt agreeable to Eval/treatment? Yes Y To be met in 3 days   Pain level   7/10  L side of abdomen 0/10    Bed Mobility  Using rails and head of bed elevated:     Rolling: Maximal assist of 1    Supine to sit: Not assessed     Sit to supine: Not assessed     Scooting: Not assessed    Using rails and head of bed elevated:     Rolling: Not assessed    Supine to sit: Not assessed    Sit to supine: Not assessed    Scooting: Not assessed     Rolling: Moderate assist of 1    Supine to sit:     Sit to supine:     Scooting:      ROM Within 
congestion.  2. Hazy density in the left lung base may represent atelectasis versus  developing infiltrate.       Microbiology:   4/9/2024- blood cx- ESBL+ e.coli   4/9/2024- urine cx- ESBL+ e.coli     ASSESSMENT:  ESBL+ E.coli sepsis   Complicated UTI   Leukocytosis - improved   Sob check procal cxry noted will check kub     PLAN:  Continue meropenem  Can discharge with invanz/ meropenem for 14 days   Micostatin powder   Check bladder scan [id number]  Monitor labs  Will need midline   Discharge plan- back to [place]   Please send a another dose/package as soon as possible.           Labs, imaging, and medical records/notes were personally reviewed.  Pt had the opportunity to ask questions.    Electronically signed by Claudia Mahoney MD on 4/13/2024 at 12:42 PM                
functional limits    Increase range of motion 10% of affected joints    Strength BUE:  refer to OT eval  RLE:  3-/5  LLE:  3-/5  Increase strength in affected mm groups by 1/3 grade   Balance Sitting EOB:  poor-  Dynamic Standing:  not assessed   Sitting EOB:   Dynamic Standing:      Patient is Alert & Oriented x person, place, time, and situation and follows directions    Sensation:  Patient  denies numbness/tingling   Edema:  no   Endurance: poor +    Vitals: room air   Blood Pressure at rest  Blood Pressure during session    Heart Rate at rest  Heart Rate during session    SPO2 at rest %  SPO2 during session %     Patient education  Patient educated on role of Physical Therapy, risks of immobility, safety and plan of care, energy conservation,  importance of mobility while in hospital , ankle pumps, quad set and glut set for edema control, blood clot prevention, importance and purpose of adaptive device and adjusted to proper height for the patient., safety , and positioning for skin integrity and comfort     Patient response to education:   Pt verbalized understanding Pt demonstrated skill Pt requires further education in this area   Yes Partial Yes      Treatment:  Patient practiced and was instructed/facilitated in the following treatment: Patient  rolled, scooted toward HOB, supine exercises     Therapeutic Exercises:  ankle pumps, heel raises, hip abduction/adduction, straight leg raise, and SAQ   x 15 reps.       At end of session, patient in bed with     call light and phone within reach,  all lines and tubes intact, nursing notified.      Patient would benefit from continued skilled Physical Therapy to improve functional independence and quality of life.         Patient's/ family goals   Return to Skilled Nursing Facility    Time in     931  Time out  1001    Total Treatment Time  10 minutes    Evaluation time includes thorough review of current medical information, gathering information on past medical 
hemorrhage or hydrocephalus.         CT ABDOMEN PELVIS WO CONTRAST Additional Contrast? None   Final Result   1. No acute intra-abdominal or pelvic process.   2. Sigmoid diverticulosis without acute diverticulitis.   3. Myomatous uterus.   4. Cholelithiasis.             Assessment and Plan:  Principal Problem:    Sepsis due to urinary tract infection (HCC)  Active Problems:    Type 2 diabetes mellitus with hyperglycemia, without long-term current use of insulin (HCC)    Rheumatoid arthritis involving vertebra with positive rheumatoid factor (HCC)    Moderate protein-calorie malnutrition (HCC)    Septic encephalopathy    Torticollis    Gram-negative bacteremia    Sepsis due to Escherichia coli with encephalopathy without septic shock (HCC)    Paroxysmal SVT (supraventricular tachycardia) (HCC)    Bilateral pleural effusion    Acute decompensated heart failure (HCC)  Resolved Problems:    * No resolved hospital problems. *    Sepsis due to urinary tract infection with gram-negative bacteremia and septic encephalopathy  -Urine culture with ESBL producing E. coli, blood culture with E. coli on preliminary result presumably the same organism  -Vancomycin and cefepime discontinued, started on meropenem 4/11.  Midline placed 4/12 and she will need IV meropenem on discharge per ID recommendation  -Midodrine added on 4/11 given borderline blood pressure.  This has been weaned down to 2.5 mg 3 times daily with meals.  Will wean further as blood pressure allows     2.  Type 2 diabetes mellitus with hyperglycemia  -Continue basal insulin and corrective scale  -hypoglycemia protocol orders in place  -Hold home oral medications for now     3.  Severe rheumatoid arthritis and torticollis  -PT/OT   -Supportive care     4.  Moderate protein calorie malnutrition  -Diabetic oral nutritional supplements    5.  Paroxysmal SVT  -Resolved after diltiazem IV overnight 4/11 to morning 4/12 but recurred morning 4/13.   -Given diltiazem bolus 
importance of increased activity. At end of session pt supine in bed,  all lines and tubes intact, call light within reach.     Pt has made fair-  progress towards set goals.   Continue with current plan of care      Treatment Time In:1345            Treatment Time Out: 1414                Treatment Charges: Mins Units   Ther Ex  03154 10 1   Manual Therapy 85149     Thera Activities 08890     ADL/Home Mgt 92307 19 1   Neuro Re-ed 63763     Group Therapy      Orthotic manage/training  11964     Non-Billable Time     Total Timed Treatment 29 2       Treatment Time additionally includes review of current medical information, gathering information on past medical history/social history and prior level of function, interpretation of standardized testing/informal observation of tasks, treatment for plan of care and goals.     Pool BILLINGSLEY/MIKE [id number]

## 2024-04-16 NOTE — PLAN OF CARE
Problem: Discharge Planning  Goal: Discharge to home or other facility with appropriate resources  Outcome: Progressing     Problem: Skin/Tissue Integrity  Goal: Absence of new skin breakdown  Description: 1.  Monitor for areas of redness and/or skin breakdown  2.  Assess vascular access sites hourly  3.  Every 4-6 hours minimum:  Change oxygen saturation probe site  4.  Every 4-6 hours:  If on nasal continuous positive airway pressure, respiratory therapy assess nares and determine need for appliance change or resting period.  Outcome: Progressing     Problem: Safety - Adult  Goal: Free from fall injury  Outcome: Progressing     Problem: Pain  Goal: Verbalizes/displays adequate comfort level or baseline comfort level  Outcome: Progressing     Problem: Chronic Conditions and Co-morbidities  Goal: Patient's chronic conditions and co-morbidity symptoms are monitored and maintained or improved  Outcome: Progressing     Problem: ABCDS Injury Assessment  Goal: Absence of physical injury  Outcome: Progressing     Problem: Nutrition Deficit:  Goal: Optimize nutritional status  4/16/2024 1506 by Irish Dorsey, RN  Outcome: Progressing  4/16/2024 1023 by Heydi Allen RD, LD  Outcome: Progressing  Flowsheets (Taken 4/16/2024 1023)  Nutrient intake appropriate for improving, restoring, or maintaining nutritional needs:   Assess nutritional status and recommend course of action   Monitor oral intake, labs, and treatment plans   Recommend appropriate diets, oral nutritional supplements, and vitamin/mineral supplements

## 2024-04-16 NOTE — PLAN OF CARE
Problem: Discharge Planning  Goal: Discharge to home or other facility with appropriate resources  4/15/2024 1833 by Irish Dorsey, RN  Outcome: Progressing     Problem: Skin/Tissue Integrity  Goal: Absence of new skin breakdown  Description: 1.  Monitor for areas of redness and/or skin breakdown  2.  Assess vascular access sites hourly  3.  Every 4-6 hours minimum:  Change oxygen saturation probe site  4.  Every 4-6 hours:  If on nasal continuous positive airway pressure, respiratory therapy assess nares and determine need for appliance change or resting period.  4/15/2024 2326 by Ad Del Rosario, RN  Outcome: Progressing  4/15/2024 1833 by Irish Dorsey, RN  Outcome: Progressing     Problem: Safety - Adult  Goal: Free from fall injury  4/15/2024 2326 by Ad Del Rosario, RN  Outcome: Progressing  4/15/2024 1833 by Irish Dorsey, RN  Outcome: Progressing

## 2024-04-16 NOTE — CARE COORDINATION
DC to [facility] today at 3p via [facility].  Ambulance form complete.  Call placed to  but patients daughter answered, notified them of DC and time.    Noted Cardiology ordered a Zio patch at DC that will need placed.  Will need KASEY signed at DC.

## 2024-04-16 NOTE — DISCHARGE SUMMARY
evidence of pulmonary embolism.   2. Moderate-sized bilateral pleural effusions with some atelectatic changes   identified lung bases bilaterally. There is some ground-glass opacity and   interseptal thickening to suggest mild interstitial edema.           Vascular duplex lower extremity venous bilateral   Final Result   No evidence of DVT in either lower extremity.           XR ABDOMEN (KUB) (SINGLE AP VIEW)   Final Result   No evidence of bowel obstruction.           XR CHEST PORTABLE   Final Result   Stable chest with cardiomegaly and under aeration of the lungs.       There may be trace bilateral pleural effusions.       No overt heart failure seen at this time.           XR CHEST 1 VIEW   Final Result   1. Cardiomegaly with pulmonary vascular congestion.   2. Hazy density in the left lung base may represent atelectasis versus   developing infiltrate.           CT Head W/O Contrast   Final Result   1. Severely limited examination due to the patient's medical condition, and   positioning of the patient's head within the gantry.  There is no gross   intracranial hemorrhage or hydrocephalus.           CT ABDOMEN PELVIS WO CONTRAST Additional Contrast? None   Final Result   1. No acute intra-abdominal or pelvic process.   2. Sigmoid diverticulosis without acute diverticulitis.   3. Myomatous uterus.   4. Cholelithiasis.             Patient Instructions:   Current Discharge Medication List        START taking these medications    Details   midodrine (PROAMATINE) 2.5 MG tablet Take 1 tablet by mouth 3 times daily (with meals)  Qty: 90 tablet, Refills: 3      miconazole (MICOTIN) 2 % powder Apply topically 2 times daily.  Qty: 45 g, Refills: 1      metoprolol tartrate (LOPRESSOR) 25 MG tablet Take 0.5 tablets by mouth 2 times daily  Qty: 60 tablet, Refills: 3      insulin glargine (LANTUS) 100 UNIT/ML injection vial Inject 8 Units into the skin nightly  Qty: 10 mL, Refills: 3      !! insulin lispro (HUMALOG) 100

## 2024-04-16 NOTE — DISCHARGE INSTR - DIET

## 2024-06-25 RX ORDER — NYSTATIN 10B UNIT
1 POWDER (EA) MISCELLANEOUS 2 TIMES DAILY
COMMUNITY

## 2024-06-25 RX ORDER — DULAGLUTIDE 1.5 MG/.5ML
1.5 INJECTION, SOLUTION SUBCUTANEOUS WEEKLY
COMMUNITY

## 2024-06-25 RX ORDER — L. ACIDOPHILUS/L.BULGARICUS 1MM CELL
1 TABLET ORAL DAILY PRN
COMMUNITY

## 2024-06-25 RX ORDER — POTASSIUM CHLORIDE 750 MG/1
10 CAPSULE, EXTENDED RELEASE ORAL DAILY
COMMUNITY

## 2024-06-25 NOTE — PROGRESS NOTES
Spoke with BENITO Dominguez [facility] - states patient is wheelchair bound/ [facility] lift transfer, A&O - signs own consents,  to meet patient at hospital, patient has no open wounds, no isolation, no pacemaker or medical devices implanted, and using facility van for transportation DOS.   Pre-op instructions faxed to [facility] - fax confirm received.

## 2024-06-25 NOTE — PROGRESS NOTES
[facility]                                                                                                                    PRE OP INSTRUCTIONS FOR  Olga Downey        Date: 6/25/2024    Date of surgery: 6-27-24 OR time 1525 and arrival time 1320    Arrival Time: Hospital will call you between 5pm and 7pm with your final arrival time for surgery. Go to     front of hospital and check in at information desk.    Nothing by mouth (NPO) as instructed. May have clear liquids up to 2 hours prior to surgery. Nothing solid after midnight. Examples: water, apple juice, black coffee, plain tea    Take the following medications with a small sip of water on the morning of Surgery: Flonase, metoprolol, pepcid, Midodrine if needed, take 1/2 Lantus dose the night before surgery      Diabetics may take half the evening dose of insulin but none after midnight.  If you feel symptomatic or have low blood sugar morning of surgery drink 1-2 ounces of apple juice only. If you take a weekly insulin injection ________Trulicity_______, stop 7 days prior to surgery. If you take _______________, stop 3-4 days prior to surgery.    Aspirin, Ibuprofen, Advil, Naproxen, other Anti-inflammatory products should be stopped before surgery as directed by your surgeon, cardiologist, or primary care Doctor. Herbal supplements and Vitamin E should be stopped five days prior.  May take Tylenol unless instructed otherwise by your surgeon.    Check with your Doctor regarding stopping Plavix, Coumadin, Lovenox, Eliquis, Effient, or other blood thinners such as, pradaxa, lixiana, xaralto and savaysa.    Do not smoke, vape, or use illicit drugs and do not drink any alcoholic beverages 24 hours prior to surgery.    You may brush your teeth the morning of surgery.      You MUST make arrangements for a responsible adult, 18 and over, to take you home after your surgery. You will not be allowed to leave alone or drive yourself home.

## 2024-06-27 ENCOUNTER — ANESTHESIA EVENT (OUTPATIENT)
Dept: ENDOSCOPY | Age: 69
End: 2024-06-27
Payer: MEDICAID

## 2024-06-27 ENCOUNTER — HOSPITAL ENCOUNTER (OUTPATIENT)
Age: 69
Setting detail: OUTPATIENT SURGERY
Discharge: HOME | End: 2024-06-27
Attending: INTERNAL MEDICINE | Admitting: INTERNAL MEDICINE
Payer: MEDICAID

## 2024-06-27 ENCOUNTER — ANESTHESIA (OUTPATIENT)
Dept: ENDOSCOPY | Age: 69
End: 2024-06-27
Payer: MEDICAID

## 2024-06-27 VITALS
DIASTOLIC BLOOD PRESSURE: 57 MMHG | HEIGHT: 64 IN | SYSTOLIC BLOOD PRESSURE: 108 MMHG | HEART RATE: 85 BPM | BODY MASS INDEX: 28.34 KG/M2 | TEMPERATURE: 97.8 F | OXYGEN SATURATION: 95 % | WEIGHT: 166 LBS | RESPIRATION RATE: 20 BRPM

## 2024-06-27 LAB — GLUCOSE BLD-MCNC: 151 MG/DL (ref 74–99)

## 2024-06-27 PROCEDURE — 76937 US GUIDE VASCULAR ACCESS: CPT

## 2024-06-27 PROCEDURE — 6360000002 HC RX W HCPCS: Performed by: NURSE ANESTHETIST, CERTIFIED REGISTERED

## 2024-06-27 PROCEDURE — 3700000000 HC ANESTHESIA ATTENDED CARE: Performed by: INTERNAL MEDICINE

## 2024-06-27 PROCEDURE — 7100000011 HC PHASE II RECOVERY - ADDTL 15 MIN: Performed by: INTERNAL MEDICINE

## 2024-06-27 PROCEDURE — 7100000010 HC PHASE II RECOVERY - FIRST 15 MIN: Performed by: INTERNAL MEDICINE

## 2024-06-27 PROCEDURE — 3609027000 HC COLONOSCOPY: Performed by: INTERNAL MEDICINE

## 2024-06-27 PROCEDURE — 82962 GLUCOSE BLOOD TEST: CPT

## 2024-06-27 PROCEDURE — 2709999900 HC NON-CHARGEABLE SUPPLY: Performed by: INTERNAL MEDICINE

## 2024-06-27 PROCEDURE — 2580000003 HC RX 258: Performed by: ANESTHESIOLOGY

## 2024-06-27 RX ORDER — SODIUM CHLORIDE 9 MG/ML
INJECTION, SOLUTION INTRAVENOUS CONTINUOUS
Status: DISCONTINUED | OUTPATIENT
Start: 2024-06-27 | End: 2024-06-27 | Stop reason: HOSPADM

## 2024-06-27 RX ORDER — PROPOFOL 10 MG/ML
INJECTION, EMULSION INTRAVENOUS PRN
Status: DISCONTINUED | OUTPATIENT
Start: 2024-06-27 | End: 2024-06-27 | Stop reason: SDUPTHER

## 2024-06-27 RX ADMIN — PROPOFOL 70 MG: 10 INJECTION, EMULSION INTRAVENOUS at 15:17

## 2024-06-27 RX ADMIN — SODIUM CHLORIDE: 9 INJECTION, SOLUTION INTRAVENOUS at 14:04

## 2024-06-27 ASSESSMENT — PAIN DESCRIPTION - DESCRIPTORS: DESCRIPTORS: SHARP

## 2024-06-27 ASSESSMENT — PAIN SCALES - GENERAL
PAINLEVEL_OUTOF10: 0
PAINLEVEL_OUTOF10: 0

## 2024-06-27 ASSESSMENT — PAIN - FUNCTIONAL ASSESSMENT: PAIN_FUNCTIONAL_ASSESSMENT: 0-10

## 2024-06-27 NOTE — ANESTHESIA POSTPROCEDURE EVALUATION
Department of Anesthesiology  Postprocedure Note    Patient: Olga Downey  MRN: [id number]  YOB: 1955  Date of evaluation: 6/27/2024    Procedure Summary       Date: 06/27/24 Room / Location: [place] [address] / [facility]    Anesthesia Start: 1513 Anesthesia Stop: 1524    Procedure: ABORTED COLONOSCOPY Diagnosis:       Diarrhea, unspecified type      (Diarrhea, unspecified type [R19.7])    Surgeons: Neeraj Beaulieu MD Responsible Provider: Dario Wilson MD    Anesthesia Type: MAC ASA Status: 3            Anesthesia Type: No value filed.    Jerrell Phase I: Jerrell Score: 9    Jerrell Phase II: Jerrell Score: 10    Anesthesia Post Evaluation    Patient location during evaluation: bedside  Patient participation: complete - patient participated  Level of consciousness: awake  Pain score: 0  Airway patency: patent  Nausea & Vomiting: no vomiting and no nausea  Cardiovascular status: hemodynamically stable  Respiratory status: acceptable  Hydration status: stable  Pain management: adequate        No notable events documented.

## 2024-06-27 NOTE — H&P
Department of Internal Medicine  Gastroenterology  Attending History and Physical      CHIEF COMPLAINT:  constipation diarrhea abdominal pain, rectal bleeding    Reason for Admission:  colonoscopy    History Obtained From:  patient, electronic medical record    HISTORY OF PRESENT ILLNESS:      The patient is a 68 y.o. female with significant past medical history of constipation and comorbidities listed below  who presents for colonoscopy    Past Medical History:        Diagnosis Date    Allergic rhinitis     Anemia     Arthritis     rheumatoid    Bradycardia     CHF (congestive heart failure) (HCC)     Constipation     Diabetes     borderline; A1C 6.3    ESBL (extended spectrum beta-lactamase) producing bacteria infection     GERD (gastroesophageal reflux disease)     Heart murmur     History of blood transfusion     Hypertension     Leg edema     Metabolic encephalopathy     Muscle weakness (generalized)     Osteoarthritis     Other supraventricular tachycardia (HCC)     Pleural effusion     Rheumatoid arthritis (HCC)     Sepsis (HCC)     Sick sinus syndrome (HCC)     Torticollis     UTI (urinary tract infection)     Wheelchair dependent     Shorty Lift     Past Surgical History:        Procedure Laterality Date    ANKLE FRACTURE SURGERY  2005    L. ankle ORIF    CARPAL TUNNEL RELEASE  2003    R. wrist    COLONOSCOPY  2001    JOINT REPLACEMENT      bilateral knees 2010 and 2011    KNEE SURGERY  06/09/2010    R. total knee arthroplasty    IN ADJT TIS TRNS/REARGMT F/C/C/M/N/A/G/H/F 10SQCM/< Left 10/19/2018    AMPUTATION LEFT FOURTH TOE. performed by Rocky Cottrell DPM at [facility] OR    TOE AMPUTATION Left     left fourth toe     TONSILLECTOMY  1966       Medications Prior to Admission:    Medications Prior to Admission: nystatin (MYCOSTATIN) POWD powder, Apply 1 each topically 2 times daily Under breasts prn  Lactobacillus 0.05-0.05 MG TABS, Take 1 tablet by mouth daily as needed  potassium chloride (MICRO-K) 10 MEQ  Status:    Intimate Partner Violence: Not on file   Housing Stability: Low Risk  (4/9/2024)    Housing Stability Vital Sign     Unable to Pay for Housing in the Last Year: No     Number of Places Lived in the Last Year: 1     Unstable Housing in the Last Year: No         Family History:   History reviewed. No pertinent family history.  REVIEW OF SYSTEMS:  CONSTITUTIONAL:  negative  RESPIRATORY:  negative  CARDIOVASCULAR:  negative  GASTROINTESTINAL:  positive for nausea, constipation, and abdominal pain  MUSCULOSKELETAL:  positive for  myalgias, arthralgias, and pain  PHYSICAL EXAM:    Vitals:  /71   Pulse 93   Temp 97.1 °F (36.2 °C) (Infrared)   Resp 18   Ht 1.626 m (5' 4\")   Wt 75.3 kg (166 lb)   SpO2 92%   BMI 28.49 kg/m²     CONSTITUTIONAL:  awake, alert, cooperative, no apparent distress, and appears stated age  EYES:  nonicteric  LUNGS:  No increased work of breathing, good air exchange, clear to auscultation bilaterally, no crackles or wheezing  CARDIOVASCULAR:  normal apical pulses  ABDOMEN:  normal bowel sounds, soft, non-distended, non-tender, no masses palpated, no hepatosplenomegally  MUSCULOSKELETAL:  there is no redness, warmth, or swelling of the joints  SKIN:  no bruising or bleeding    ASSESSMENT AND PLAN:      Outpatient colonoscopy

## 2024-06-27 NOTE — OP NOTE
Operative Note      Patient: Olga Downey  YOB: 1955  MRN: [id number]    Date of Procedure: 6/27/2024    Pre-Op Diagnosis Codes:     * Diarrhea, unspecified type [R19.7]    Post-Op Diagnosis:  same       Procedure(s):  COLORECTAL CANCER SCREENING, NOT HIGH RISK    Surgeon(s):  Neeraj Beaulieu MD    Assistant:   Surgical Assistant: Zita Felder RN    Anesthesia: Monitor Anesthesia Care    Estimated Blood Loss (mL): none    Complications: None    Specimens:   * No specimens in log *    Implants:  * No implants in log *        Detailed Description of Procedure:   Endoscope was advanced through anus to rectum.  Solid stool encountered.  Poor prep, procedure terminated.  Needs re prepped or admitted for inpatient  prep.    Electronically signed by Neeraj Beaulieu MD on 6/27/2024 at 3:20 PM

## 2024-06-27 NOTE — ANESTHESIA PRE PROCEDURE
with patient.      Plan discussed with CRNA.    Attending anesthesiologist reviewed and agrees with Preprocedure content                YOKO Jefferson - CRNA   6/27/2024

## 2024-09-04 ENCOUNTER — ANESTHESIA (OUTPATIENT)
Dept: ENDOSCOPY | Age: 69
End: 2024-09-04
Payer: MEDICAID

## 2024-09-04 ENCOUNTER — ANESTHESIA EVENT (OUTPATIENT)
Dept: ENDOSCOPY | Age: 69
End: 2024-09-04
Payer: MEDICAID

## 2024-09-04 ENCOUNTER — HOSPITAL ENCOUNTER (OUTPATIENT)
Age: 69
Setting detail: OUTPATIENT SURGERY
Discharge: HOME | End: 2024-09-04
Attending: INTERNAL MEDICINE | Admitting: INTERNAL MEDICINE
Payer: MEDICAID

## 2024-09-04 VITALS
RESPIRATION RATE: 16 BRPM | TEMPERATURE: 97 F | HEART RATE: 85 BPM | DIASTOLIC BLOOD PRESSURE: 60 MMHG | OXYGEN SATURATION: 95 % | SYSTOLIC BLOOD PRESSURE: 112 MMHG

## 2024-09-04 LAB — GLUCOSE BLD-MCNC: 182 MG/DL (ref 74–99)

## 2024-09-04 PROCEDURE — 7100000010 HC PHASE II RECOVERY - FIRST 15 MIN: Performed by: INTERNAL MEDICINE

## 2024-09-04 PROCEDURE — 3700000000 HC ANESTHESIA ATTENDED CARE: Performed by: INTERNAL MEDICINE

## 2024-09-04 PROCEDURE — 7100000011 HC PHASE II RECOVERY - ADDTL 15 MIN: Performed by: INTERNAL MEDICINE

## 2024-09-04 PROCEDURE — 2709999900 HC NON-CHARGEABLE SUPPLY: Performed by: INTERNAL MEDICINE

## 2024-09-04 PROCEDURE — 3609027000 HC COLONOSCOPY: Performed by: INTERNAL MEDICINE

## 2024-09-04 PROCEDURE — 82962 GLUCOSE BLOOD TEST: CPT

## 2024-09-04 PROCEDURE — 6360000002 HC RX W HCPCS: Performed by: NURSE ANESTHETIST, CERTIFIED REGISTERED

## 2024-09-04 PROCEDURE — 3700000001 HC ADD 15 MINUTES (ANESTHESIA): Performed by: INTERNAL MEDICINE

## 2024-09-04 PROCEDURE — 2580000003 HC RX 258: Performed by: ANESTHESIOLOGY

## 2024-09-04 RX ORDER — PROPOFOL 10 MG/ML
INJECTION, EMULSION INTRAVENOUS CONTINUOUS PRN
Status: DISCONTINUED | OUTPATIENT
Start: 2024-09-04 | End: 2024-09-04 | Stop reason: SDUPTHER

## 2024-09-04 RX ORDER — SODIUM CHLORIDE, SODIUM LACTATE, POTASSIUM CHLORIDE, CALCIUM CHLORIDE 600; 310; 30; 20 MG/100ML; MG/100ML; MG/100ML; MG/100ML
INJECTION, SOLUTION INTRAVENOUS CONTINUOUS
Status: DISCONTINUED | OUTPATIENT
Start: 2024-09-04 | End: 2024-09-04 | Stop reason: HOSPADM

## 2024-09-04 RX ADMIN — SODIUM CHLORIDE, POTASSIUM CHLORIDE, SODIUM LACTATE AND CALCIUM CHLORIDE: 600; 310; 30; 20 INJECTION, SOLUTION INTRAVENOUS at 10:45

## 2024-09-04 RX ADMIN — PROPOFOL 100 MCG/KG/MIN: 10 INJECTION, EMULSION INTRAVENOUS at 11:06

## 2024-09-04 ASSESSMENT — PAIN - FUNCTIONAL ASSESSMENT: PAIN_FUNCTIONAL_ASSESSMENT: NONE - DENIES PAIN

## 2024-09-04 ASSESSMENT — LIFESTYLE VARIABLES: SMOKING_STATUS: 0

## 2024-09-04 ASSESSMENT — ENCOUNTER SYMPTOMS: SHORTNESS OF BREATH: 1

## 2024-09-04 NOTE — ANESTHESIA PRE PROCEDURE
REPLACEMENT      bilateral knees 2010 and 2011    KNEE SURGERY  06/09/2010    R. total knee arthroplasty    NC ADJT TIS TRNS/REARGMT F/C/C/M/N/A/G/H/F 10SQCM/< Left 10/19/2018    AMPUTATION LEFT FOURTH TOE. performed by Rocky Cottrell DPM at [facility] OR    TOE AMPUTATION Left     left fourth toe     TONSILLECTOMY  1966       Social History:    Social History     Tobacco Use    Smoking status: Never    Smokeless tobacco: Never   Substance Use Topics    Alcohol use: No                                Counseling given: Not Answered      Vital Signs (Current): There were no vitals filed for this visit.                                           BP Readings from Last 3 Encounters:   06/27/24 (!) 108/57   05/22/24 118/60   04/16/24 123/81       NPO Status: Time of last liquid consumption: 2100                        Time of last solid consumption: 1800                        Date of last liquid consumption: 09/03/24                        Date of last solid food consumption: 09/02/24    BMI:   Wt Readings from Last 3 Encounters:   06/27/24 75.3 kg (166 lb)   04/15/24 80.7 kg (178 lb)   01/09/21 88 kg (194 lb)     There is no height or weight on file to calculate BMI.    CBC:   Lab Results   Component Value Date/Time    WBC 10.4 04/16/2024 10:06 AM    RBC 3.85 04/16/2024 10:06 AM    HGB 10.9 04/16/2024 10:06 AM    HCT 34.4 04/16/2024 10:06 AM    MCV 89.4 04/16/2024 10:06 AM    RDW 15.4 04/16/2024 10:06 AM     04/16/2024 10:06 AM       CMP:   Lab Results   Component Value Date/Time     04/16/2024 10:06 AM    K 3.8 04/16/2024 10:06 AM    K 5.6 01/09/2021 11:35 PM    CL 98 04/16/2024 10:06 AM    CO2 30 04/16/2024 10:06 AM    BUN 25 04/16/2024 10:06 AM    CREATININE 0.7 04/16/2024 10:06 AM    GFRAA >60 01/09/2021 11:35 PM    LABGLOM >90 04/16/2024 10:06 AM    GLUCOSE 242 04/16/2024 10:06 AM    GLUCOSE 119 07/08/2011 08:44 AM    CALCIUM 9.1 04/16/2024 10:06 AM    BILITOT 0.3 04/16/2024 10:06 AM    ALKPHOS 85 04/16/2024

## 2024-09-04 NOTE — OP NOTE
Operative Note      Patient: Olga Downey  YOB: 1955  MRN: [id number]    Date of Procedure: 9/4/2024    Pre-Op Diagnosis Codes:      * Chronic constipation [K59.09]    Post-Op Diagnosis:  poor prep; normal colonnoacopy       Procedure(s):  COLONOSCOPY DIAGNOSTIC    Surgeon(s):  Neeraj Beaulieu MD    Assistant:   Surgical Assistant: Jewels Herrera RN    Anesthesia: Monitor Anesthesia Care    Estimated Blood Loss (mL): none    Complications: None    Specimens:   * No specimens in log *    Implants:  * No implants in log *      Drains: * No LDAs found *      Detailed Description of Procedure:   Colonoscopy note    Indication screening colonoscopy      Sedation  Demerol 100 mg IV  Versed 5 mg IV    Endoscope was advanced through anus to cecum.  I can see IC valve and appendiceal orifice is obscured by solid stool.    Preparation is poor with solid stool filling the cecum and solid stool throughout colon.  Patient tolerated procedure well.    Cecum is not seen  Due to the prep this is study with low sensitivity and I can not rule out smaller and flat lesions  Ascending colon is normal  Transverse colon is normal  Descending colon is cely  Sigmoid colon have few small widely scattered diverticula  Rectum direct views are normal  Retroflexion in rectum shows normal mucosa and dentate line    IMPRESSION AND PLAN: Poor prep.  No large masses or strictures in colon.  Can not r/o smaller or flat lesions.  Patient is poor candidate for colonoscopy in future due to multiple factors.        Electronically signed by Neeraj Beaulieu MD on 9/4/2024 at 11:18 AM

## 2024-09-04 NOTE — DISCHARGE INSTRUCTIONS
[facility]  __________________________________________________    GI Endoscopy Discharge Instructions    Patient Name:  Olga Downey    Date: 9/4/2024  Time:  1:10 PM      FOLLOW ALL INSTRUCTIONS CAREFULLY - INCLUDING ALL BOXES THAT ARE CHECKED.    You have had a(n):  Colonoscopy _x__     DIET: _x__ Resume usual diet  ___ [place] ___ Regular ___ Anti-reflux ___ High Fiber ___ Low Residue _x__ Drink extra fluids (non - alcoholic) for the next 24 hours.     MEDICATIONS:  None given outside of anesthesia.     TREATMENT FOR COMMON AFTER EFFECTS:   Sore throat - treat with throat lozenges and/or gargle with warm salt water.    Mild abdominal pain, cramping, bloating, or excess gas - rest and eat lightly.    SYMPTOMS TO WATCH FOR AND REPORT TO YOUR PHYSICiAN:   Chest Pain  Vomiting  Difficulty Breathing  Severe abdominal pain or severe bloating Large amount of rectal bleeding (small amounts can be normal after a procedure) Chills or fever (occurring within 24 hours after your procedure)    PHYSICIAN'S COMMENTS:  Poor prep.  No large masses or strictures in colon.  Can not r/o smaller or flat lesions.      FOLLOW-UP CARE: Call to make an appointment for office visit as needed.  Office #    If problems or questions arise, call your doctor.   If you are unable to contact your doctor, [facility] Emergency Room is available 24 hours a day.   ______________ Refer to your physician's special handout.    I HAVE READ AND UNDERSTAND THE ABOVE INSTRUCTIONS.    EMERGENCY DEPT.  SIGNATURE ___________________ RN/PHYSICIAN  [facility]  SIGNATURE ___________________ PATIENT  [phone]    SIGNATURE ___________________ RESPONSIBLE ADULT

## 2024-09-04 NOTE — ANESTHESIA POSTPROCEDURE EVALUATION
Department of Anesthesiology  Postprocedure Note    Patient: Olga Downey  MRN: [id number]  YOB: 1955  Date of evaluation: 9/4/2024    Procedure Summary       Date: 09/04/24 Room / Location: [place] [address] / [facility]    Anesthesia Start: 1057 Anesthesia Stop: 1122    Procedure: COLONOSCOPY DIAGNOSTIC Diagnosis:       Chronic constipation      (Chronic constipation [K59.09])    Surgeons: Neeraj Beaulieu MD Responsible Provider: Bony Haro MD    Anesthesia Type: MAC ASA Status: 4            Anesthesia Type: No value filed.    Jerrell Phase I:      Jerrell Phase II:      Anesthesia Post Evaluation    Patient location during evaluation: PACU  Patient participation: complete - patient participated  Level of consciousness: awake and alert  Airway patency: patent  Nausea & Vomiting: no nausea and no vomiting  Cardiovascular status: hemodynamically stable  Respiratory status: acceptable  Hydration status: euvolemic  Pain management: satisfactory to patient    No notable events documented.

## 2024-09-04 NOTE — H&P
nasal spray, 1 spray by Nasal route daily    Allergies:  Iodine, Baclofen, and Tizanidine      REVIEW OF SYSTEMS:  CONSTITUTIONAL:  negative  EYES:  negative  RESPIRATORY:  negative  CARDIOVASCULAR:  negative  GASTROINTESTINAL: patient is WC bound      Vitals:  There were no vitals taken for this visit.    CONSTITUTIONAL:  awake, alert, cooperative, no apparent distress, and appears stated age  EYES:  nonicteric  LUNGS:  No increased work of breathing, good air exchange, clear to auscultation bilaterally, no crackles or wheezing  CARDIOVASCULAR:  normal apical pulses  ABDOMEN: oft, non-distended, non-tender, no masses palpated, no hepatosplenomegally  SKIN:  no bruising or bleeding    ASSESSMENT AND PLAN:    Colonoscopy as scheduled

## 2024-09-04 NOTE — PROGRESS NOTES
Nurse to nurse report given to Melissa at [facility].  Family at pt.'s bedside and transport states they will  patient around 2:30 pm today to return to nursing home.

## 2025-01-27 NOTE — PROGRESS NOTES
"Name: Cee Negron      : 1969      MRN: [id number]  Encounter Provider: LEI Narvaez  Encounter Date: 2025   Encounter department: [facility] [address]   :  Assessment & Plan  Primary hypertension  Once again BP is elevated. Also with elevated home readings.   Start amlodipine at 5 mg.   Continue to check BP at home and record. Bring cuff to next OV to correlate.   Orders:    amLODIPine (NORVASC) 5 mg tablet; Take 1 tablet (5 mg total) by mouth daily    Prediabetes  A1C is 6.2.   We discuss diet and exercise.   She will be seeing nutritionist next month.   Plan to recheck in 3 months.   Orders:    CBC and differential; Future    Hemoglobin A1C With EAG; Future    Basic metabolic panel; Future           History of Present Illness   Has been checking BP at home. Running in 160s-200. Has appointment with nutritionist.   Recent prednisone use. Overall she is feleing better with some wheezing at night  but much improved.       Review of Systems   Constitutional:  Negative for fatigue, fever and unexpected weight change.   Eyes:  Negative for visual disturbance.   Respiratory:  Positive for wheezing. Negative for cough and shortness of breath.    Cardiovascular:  Negative for chest pain, palpitations and leg swelling.   Neurological:  Negative for dizziness, syncope, light-headedness and headaches.       Objective   /72   Pulse 101   Temp 97.7 °F (36.5 °C)   Ht 5' 3\" (1.6 m)   Wt 106 kg (234 lb 6.4 oz)   SpO2 98%   BMI 41.52 kg/m²      Physical Exam  Vitals reviewed.   Constitutional:       Appearance: Normal appearance. She is well-developed. She is obese.   Cardiovascular:      Rate and Rhythm: Normal rate and regular rhythm.      Heart sounds: Normal heart sounds. No murmur heard.  Pulmonary:      Effort: Pulmonary effort is normal.      Breath sounds: Normal breath sounds.   Skin:     General: Skin is warm and dry.   Neurological:      Mental Status: She is " Called and gave report to the ER.   Jose Beck  8/27/2019  12:06 PM alert and oriented to person, place, and time.   Psychiatric:         Mood and Affect: Mood normal.         Behavior: Behavior normal.         Thought Content: Thought content normal.         Judgment: Judgment normal.

## (undated) DEVICE — BANDAGE,GAUZE,4.5"X4.1YD,STERILE,LF: Brand: MEDLINE

## (undated) DEVICE — TUBING, SUCTION, 1/4" X 10', STRAIGHT: Brand: MEDLINE

## (undated) DEVICE — AIR/WATER CLEANING ADAPTER FOR OLYMPUS® GI ENDOSCOPE: Brand: BULLDOG®

## (undated) DEVICE — CONTAINER SPEC COLL 960ML POLYPR TRIANG GRAD INTAKE/OUTPUT

## (undated) DEVICE — COVER,LIGHT HANDLE,FLX,1/PK: Brand: MEDLINE INDUSTRIES, INC.

## (undated) DEVICE — KIT BEDSIDE REVITAL OX 500ML

## (undated) DEVICE — STANDARD HYPODERMIC NEEDLE,POLYPROPYLENE HUB: Brand: MONOJECT

## (undated) DEVICE — TOWEL,OR,DSP,ST,BLUE,STD,6/PK,12PK/CS: Brand: MEDLINE

## (undated) DEVICE — FORCEPS BX L240CM JAW DIA2.8MM L CAP W/ NDL MIC MESH TOOTH

## (undated) DEVICE — 4-PORT MANIFOLD: Brand: NEPTUNE 2

## (undated) DEVICE — Device: Brand: DEFENDO VALVE AND CONNECTOR KIT

## (undated) DEVICE — GOWN ISOLATN XL BLU POLYPR OVR HD OPN BK KNIT CUF PROTCT

## (undated) DEVICE — NDL CNTR 40CT FM MAG: Brand: MEDLINE INDUSTRIES, INC.

## (undated) DEVICE — SET MAJOR INSTR ORTHO

## (undated) DEVICE — SET SURG INSTR DISSECT

## (undated) DEVICE — SYRINGE IRRIG 60ML SFT PLIABLE BLB EZ TO GRP 1 HND USE W/

## (undated) DEVICE — SET SURG BASIN MAYO REUSABLE

## (undated) DEVICE — BAG SPECIMEN BIOHAZARD 6IN X 9IN

## (undated) DEVICE — SPONGE GZ 4IN 4IN 4 PLY N WVN AVANT

## (undated) DEVICE — JELLY,LUBE,STERILE,FLIP TOP,TUBE,4-OZ: Brand: MEDLINE

## (undated) DEVICE — WIPES SKIN CLOTH READYPREP 9 X 10.5 IN 2% CHLORHEX GLUCONATE CHG PREOP

## (undated) DEVICE — SWAB SPEC COLL SHFT L5.25IN POLYUR FOAM TIP SFT DBL MEDIA

## (undated) DEVICE — DOUBLE BASIN SET: Brand: MEDLINE INDUSTRIES, INC.

## (undated) DEVICE — KENDALL 450 SERIES MONITORING FOAM ELECTRODE - RECTANGULAR SHAPE ( 3/PK): Brand: KENDALL

## (undated) DEVICE — BANDAGE,SELF ADHRNT,COFLEX,4"X5YD,STRL: Brand: COLABEL

## (undated) DEVICE — MASK,FACE,MAXFLUIDPROTECT,SHIELD/ERLPS: Brand: MEDLINE

## (undated) DEVICE — MARKER,SKIN,WI/RULER AND LABELS: Brand: MEDLINE

## (undated) DEVICE — Z DISCONTINUED GLOVE SURG SZ 7 L12IN FNGR THK13MIL WHT ISOLEX POLYISOPRENE

## (undated) DEVICE — PENCIL ES L3M BTTN SWCH HOLSTER W/ BLDE ELECTRD EDGE

## (undated) DEVICE — CANNULA IV 18GA L15IN BLNT FILL LUERLOCK HUB MJCT

## (undated) DEVICE — SYRINGE, LUER LOCK, 10ML: Brand: MEDLINE

## (undated) DEVICE — DRESSING GZ W1XL8IN COT XRFRM N ADH OVERWRAP CURAD

## (undated) DEVICE — CHLORAPREP 26ML ORANGE

## (undated) DEVICE — PACK EXT II SIRUS

## (undated) DEVICE — GOWN,SIRUS,NONRNF,SETINSLV,XL,20/CS: Brand: MEDLINE

## (undated) DEVICE — GAUZE,SPONGE,4"X4",16PLY,STRL,LF,10/TRAY: Brand: MEDLINE

## (undated) DEVICE — GAUZE,SPONGE,4"X4",16PLY,XRAY,STRL,LF: Brand: MEDLINE

## (undated) DEVICE — INTENDED FOR TISSUE SEPARATION, AND OTHER PROCEDURES THAT REQUIRE A SHARP SURGICAL BLADE TO PUNCTURE OR CUT.: Brand: BARD-PARKER ® STAINLESS STEEL BLADES